# Patient Record
Sex: MALE | Race: WHITE | NOT HISPANIC OR LATINO | Employment: UNEMPLOYED | URBAN - METROPOLITAN AREA
[De-identification: names, ages, dates, MRNs, and addresses within clinical notes are randomized per-mention and may not be internally consistent; named-entity substitution may affect disease eponyms.]

---

## 2018-07-05 ENCOUNTER — OFFICE VISIT (OUTPATIENT)
Dept: RETAIL CLINIC | Facility: CLINIC | Age: 38
End: 2018-07-05

## 2018-07-05 VITALS
SYSTOLIC BLOOD PRESSURE: 118 MMHG | HEART RATE: 96 BPM | OXYGEN SATURATION: 95 % | DIASTOLIC BLOOD PRESSURE: 80 MMHG | TEMPERATURE: 97.1 F | RESPIRATION RATE: 16 BRPM

## 2018-07-05 DIAGNOSIS — L03.114 CELLULITIS OF LEFT UPPER EXTREMITY: Primary | ICD-10-CM

## 2018-07-05 PROCEDURE — 99213 OFFICE O/P EST LOW 20 MIN: CPT | Performed by: NURSE PRACTITIONER

## 2018-07-05 RX ORDER — SULFAMETHOXAZOLE AND TRIMETHOPRIM 800; 160 MG/1; MG/1
1 TABLET ORAL 2 TIMES DAILY
Qty: 20 TABLET | Refills: 0 | Status: SHIPPED | OUTPATIENT
Start: 2018-07-05 | End: 2018-07-15

## 2018-07-05 NOTE — PROGRESS NOTES
Usman Burrell is a 38 y.o. male.     Cellulitis   This is a new problem. The current episode started yesterday. The problem occurs constantly. The problem has been gradually worsening. Associated symptoms include a rash. Pertinent negatives include no anorexia, coughing, fever, headaches, myalgias, nausea or vomiting. Nothing aggravates the symptoms. He has tried nothing for the symptoms.        The following portions of the patient's history were reviewed and updated as appropriate: allergies, current medications, past family history, past medical history, past social history, past surgical history and problem list.    Review of Systems   Constitutional: Negative for fever.   Respiratory: Negative for cough.    Gastrointestinal: Negative for anorexia, nausea and vomiting.   Musculoskeletal: Negative for myalgias.   Skin: Positive for rash.   Neurological: Negative for headaches.           Physical Exam   Constitutional: He is oriented to person, place, and time. He appears well-developed and well-nourished. No distress.   HENT:   Head: Normocephalic and atraumatic.   Right Ear: External ear normal.   Left Ear: External ear normal.   Nose: Nose normal.   Mouth/Throat: Oropharynx is clear and moist.   Eyes: EOM are normal. Pupils are equal, round, and reactive to light.   Neck: Normal range of motion. Neck supple.   Cardiovascular: Normal rate, regular rhythm and normal heart sounds.    No murmur heard.  Pulmonary/Chest: Effort normal and breath sounds normal. No respiratory distress.   Neurological: He is alert and oriented to person, place, and time.   Skin: Skin is warm and dry. Rash (1mm scabbed lesion on left 1st nuckle with surrounding erythema approx. 3cm around.  tender to touch, warm to touch, minimal decrease in motion of joint secondary to pain.  no streaking, no drainage) noted.   Psychiatric: He has a normal mood and affect. His behavior is normal. Judgment and thought content normal.            Diagnoses and all orders for this visit:    Cellulitis of left upper extremity    Other orders  -     sulfamethoxazole-trimethoprim (BACTRIM DS) 800-160 MG per tablet; Take 1 tablet by mouth 2 (Two) Times a Day for 10 days.      Cellulitis, Adult  Cellulitis is a skin infection. The infected area is usually red and tender. This condition occurs most often in the arms and lower legs. The infection can travel to the muscles, blood, and underlying tissue and become serious. It is very important to get treated for this condition.  What are the causes?  Cellulitis is caused by bacteria. The bacteria enter through a break in the skin, such as a cut, burn, insect bite, open sore, or crack.  What increases the risk?  This condition is more likely to occur in people who:  · Have a weak defense system (immune system).  · Have open wounds on the skin such as cuts, burns, bites, and scrapes. Bacteria can enter the body through these open wounds.  · Are older.  · Have diabetes.  · Have a type of long-lasting (chronic) liver disease (cirrhosis) or kidney disease.  · Use IV drugs.    What are the signs or symptoms?  Symptoms of this condition include:  · Redness, streaking, or spotting on the skin.  · Swollen area of the skin.  · Tenderness or pain when an area of the skin is touched.  · Warm skin.  · Fever.  · Chills.  · Blisters.    How is this diagnosed?  This condition is diagnosed based on a medical history and physical exam. You may also have tests, including:  · Blood tests.  · Lab tests.  · Imaging tests.    How is this treated?  Treatment for this condition may include:  · Medicines, such as antibiotic medicines or antihistamines.  · Supportive care, such as rest and application of cold or warm cloths (cold or warm compresses) to the skin.  · Hospital care, if the condition is severe.    The infection usually gets better within 1-2 days of treatment.  Follow these instructions at home:  · Take over-the-counter and  prescription medicines only as told by your health care provider.  · If you were prescribed an antibiotic medicine, take it as told by your health care provider. Do not stop taking the antibiotic even if you start to feel better.  · Drink enough fluid to keep your urine clear or pale yellow.  · Do not touch or rub the infected area.  · Raise (elevate) the infected area above the level of your heart while you are sitting or lying down.  · Apply warm or cold compresses to the affected area as told by your health care provider.  · Keep all follow-up visits as told by your health care provider. This is important. These visits let your health care provider make sure a more serious infection is not developing.  Contact a health care provider if:  · You have a fever.  · Your symptoms do not improve within 1-2 days of starting treatment.  · Your bone or joint underneath the infected area becomes painful after the skin has healed.  · Your infection returns in the same area or another area.  · You notice a swollen bump in the infected area.  · You develop new symptoms.  · You have a general ill feeling (malaise) with muscle aches and pains.  Get help right away if:  · Your symptoms get worse.  · You feel very sleepy.  · You develop vomiting or diarrhea that persists.  · You notice red streaks coming from the infected area.  · Your red area gets larger or turns dark in color.  This information is not intended to replace advice given to you by your health care provider. Make sure you discuss any questions you have with your health care provider.  Document Released: 09/27/2006 Document Revised: 04/27/2017 Document Reviewed: 10/26/2016  [employer] Interactive Patient Education © 2018 [employer]

## 2018-07-05 NOTE — PATIENT INSTRUCTIONS
Cellulitis, Adult  Cellulitis is a skin infection. The infected area is usually red and tender. This condition occurs most often in the arms and lower legs. The infection can travel to the muscles, blood, and underlying tissue and become serious. It is very important to get treated for this condition.  What are the causes?  Cellulitis is caused by bacteria. The bacteria enter through a break in the skin, such as a cut, burn, insect bite, open sore, or crack.  What increases the risk?  This condition is more likely to occur in people who:  · Have a weak defense system (immune system).  · Have open wounds on the skin such as cuts, burns, bites, and scrapes. Bacteria can enter the body through these open wounds.  · Are older.  · Have diabetes.  · Have a type of long-lasting (chronic) liver disease (cirrhosis) or kidney disease.  · Use IV drugs.    What are the signs or symptoms?  Symptoms of this condition include:  · Redness, streaking, or spotting on the skin.  · Swollen area of the skin.  · Tenderness or pain when an area of the skin is touched.  · Warm skin.  · Fever.  · Chills.  · Blisters.    How is this diagnosed?  This condition is diagnosed based on a medical history and physical exam. You may also have tests, including:  · Blood tests.  · Lab tests.  · Imaging tests.    How is this treated?  Treatment for this condition may include:  · Medicines, such as antibiotic medicines or antihistamines.  · Supportive care, such as rest and application of cold or warm cloths (cold or warm compresses) to the skin.  · Hospital care, if the condition is severe.    The infection usually gets better within 1-2 days of treatment.  Follow these instructions at home:  · Take over-the-counter and prescription medicines only as told by your health care provider.  · If you were prescribed an antibiotic medicine, take it as told by your health care provider. Do not stop taking the antibiotic even if you start to feel  better.  · Drink enough fluid to keep your urine clear or pale yellow.  · Do not touch or rub the infected area.  · Raise (elevate) the infected area above the level of your heart while you are sitting or lying down.  · Apply warm or cold compresses to the affected area as told by your health care provider.  · Keep all follow-up visits as told by your health care provider. This is important. These visits let your health care provider make sure a more serious infection is not developing.  Contact a health care provider if:  · You have a fever.  · Your symptoms do not improve within 1-2 days of starting treatment.  · Your bone or joint underneath the infected area becomes painful after the skin has healed.  · Your infection returns in the same area or another area.  · You notice a swollen bump in the infected area.  · You develop new symptoms.  · You have a general ill feeling (malaise) with muscle aches and pains.  Get help right away if:  · Your symptoms get worse.  · You feel very sleepy.  · You develop vomiting or diarrhea that persists.  · You notice red streaks coming from the infected area.  · Your red area gets larger or turns dark in color.  This information is not intended to replace advice given to you by your health care provider. Make sure you discuss any questions you have with your health care provider.  Document Released: 09/27/2006 Document Revised: 04/27/2017 Document Reviewed: 10/26/2016  [employer] Interactive Patient Education © 2018 [employer]

## 2018-07-20 ENCOUNTER — OFFICE VISIT (OUTPATIENT)
Dept: FAMILY MEDICINE CLINIC | Facility: CLINIC | Age: 38
End: 2018-07-20

## 2018-07-20 VITALS
WEIGHT: 235 LBS | TEMPERATURE: 98.6 F | HEART RATE: 86 BPM | SYSTOLIC BLOOD PRESSURE: 120 MMHG | HEIGHT: 68 IN | OXYGEN SATURATION: 94 % | RESPIRATION RATE: 16 BRPM | BODY MASS INDEX: 35.61 KG/M2 | DIASTOLIC BLOOD PRESSURE: 82 MMHG

## 2018-07-20 DIAGNOSIS — L30.9 ECZEMA, UNSPECIFIED TYPE: ICD-10-CM

## 2018-07-20 DIAGNOSIS — K21.9 GASTROESOPHAGEAL REFLUX DISEASE WITHOUT ESOPHAGITIS: ICD-10-CM

## 2018-07-20 DIAGNOSIS — Z00.00 LABORATORY EXAMINATION ORDERED AS PART OF A ROUTINE GENERAL MEDICAL EXAMINATION: ICD-10-CM

## 2018-07-20 DIAGNOSIS — I83.811 VARICOSE VEINS OF LOWER EXTREMITY WITH PAIN, RIGHT: Primary | ICD-10-CM

## 2018-07-20 PROCEDURE — 99213 OFFICE O/P EST LOW 20 MIN: CPT | Performed by: PHYSICIAN ASSISTANT

## 2018-07-20 RX ORDER — FLUTICASONE PROPIONATE 0.05 %
CREAM (GRAM) TOPICAL 2 TIMES DAILY
Qty: 60 G | Refills: 5 | Status: SHIPPED | OUTPATIENT
Start: 2018-07-20 | End: 2020-03-24

## 2018-07-20 RX ORDER — RANITIDINE HCL 75 MG
75 TABLET ORAL 2 TIMES DAILY PRN
COMMUNITY
End: 2020-03-24

## 2018-07-20 NOTE — PROGRESS NOTES
Subjective   Usman Burrell is a 38 y.o. male.     History of Present Illness   Usman Burrell 38 y.o. male who presents today for a new patient appointment.    he has a history of   Patient Active Problem List   Diagnosis   • Varicose veins of lower extremity with pain, right   • Gastroesophageal reflux disease without esophagitis   .  he is here to establish care I reviewed the PFSH recorded today by my MA/LPN staff.   he   He has been feeling tired.  He has been working more hours??  No thyroid d/o in family  Not snoring  GERD controlled with Zantac    Large varicose vein right lower leg and hurts--mom has them  Lost 50 lbs and bp better  Was seeing DR Wilkins  Had eye exam June and fine;  Mom has glaucoma    He has been more tired and will get prevention labs    Rash for years right medial calf and steroid crm in past helps and comes back    The following portions of the patient's history were reviewed and updated as appropriate: allergies, current medications, past family history, past medical history, past social history, past surgical history and problem list.    Review of Systems   Constitutional: Positive for fatigue. Negative for activity change, appetite change and unexpected weight change.   HENT: Negative for nosebleeds and trouble swallowing.    Eyes: Negative for pain and visual disturbance.   Respiratory: Negative for chest tightness, shortness of breath and wheezing.    Cardiovascular: Negative for chest pain and palpitations.   Gastrointestinal: Negative for abdominal pain and blood in stool.   Endocrine: Negative.    Genitourinary: Negative for difficulty urinating and hematuria.   Musculoskeletal: Negative for joint swelling.   Skin: Positive for rash. Negative for color change.   Allergic/Immunologic: Negative.    Neurological: Negative for syncope and speech difficulty.   Hematological: Negative for adenopathy.   Psychiatric/Behavioral: Negative for agitation and confusion.   All other systems  reviewed and are negative.      Objective   Physical Exam   Constitutional: He is oriented to person, place, and time. He appears well-developed and well-nourished. No distress.   HENT:   Head: Normocephalic and atraumatic.   Right Ear: External ear normal.   Left Ear: External ear normal.   Nose: Nose normal.   Mouth/Throat: Oropharynx is clear and moist. No oropharyngeal exudate.   Eyes: Pupils are equal, round, and reactive to light. Conjunctivae and EOM are normal. Right eye exhibits no discharge. Left eye exhibits no discharge. No scleral icterus.   Neck: Normal range of motion. Neck supple. No tracheal deviation present. No thyromegaly present.   Cardiovascular: Normal rate, regular rhythm, normal heart sounds, intact distal pulses and normal pulses.  Exam reveals no gallop.    No murmur heard.  Pulmonary/Chest: Effort normal and breath sounds normal. No respiratory distress. He has no wheezes. He has no rales.   Abdominal: Soft. There is no tenderness.   Musculoskeletal: Normal range of motion. He exhibits no tenderness.   Varicose veins right leg--large   Lymphadenopathy:     He has no cervical adenopathy.   Neurological: He is alert and oriented to person, place, and time. He exhibits normal muscle tone. Coordination normal.   Skin: Skin is warm. Rash (right leg medial calf cluster pink papules) noted. No erythema. No pallor.   ? Finger right pink papules; eczema also   Psychiatric: He has a normal mood and affect. His behavior is normal. Judgment and thought content normal.   Nursing note and vitals reviewed.      Assessment/Plan   Usman was seen today for establish care and fatigue.    Diagnoses and all orders for this visit:    Varicose veins of lower extremity with pain, right  -     Ambulatory Referral to Vascular Surgery    Gastroesophageal reflux disease without esophagitis    Eczema, unspecified type    Laboratory examination ordered as part of a routine general medical examination    Other  orders  -     fluticasone (CUTIVATE) 0.05 % cream; Apply  topically 2 (Two) Times a Day. To eczema PRN

## 2018-07-25 LAB
25(OH)D3+25(OH)D2 SERPL-MCNC: 27.2 NG/ML (ref 30–100)
ALBUMIN SERPL-MCNC: 4.9 G/DL (ref 3.5–5.2)
ALBUMIN/GLOB SERPL: 2 G/DL
ALP SERPL-CCNC: 53 U/L (ref 39–117)
ALT SERPL-CCNC: 30 U/L (ref 1–41)
APPEARANCE UR: CLEAR
AST SERPL-CCNC: 16 U/L (ref 1–40)
BACTERIA #/AREA URNS HPF: NORMAL /HPF
BASOPHILS # BLD AUTO: 0.05 10*3/MM3 (ref 0–0.2)
BASOPHILS NFR BLD AUTO: 1 % (ref 0–1.5)
BILIRUB SERPL-MCNC: 0.4 MG/DL (ref 0.1–1.2)
BILIRUB UR QL STRIP: NEGATIVE
BUN SERPL-MCNC: 21 MG/DL (ref 6–20)
BUN/CREAT SERPL: 18.3 (ref 7–25)
CALCIUM SERPL-MCNC: 9.5 MG/DL (ref 8.6–10.5)
CASTS URNS MICRO: NORMAL
CHLORIDE SERPL-SCNC: 102 MMOL/L (ref 98–107)
CHOLEST SERPL-MCNC: 151 MG/DL (ref 0–200)
CO2 SERPL-SCNC: 25.8 MMOL/L (ref 22–29)
COLOR UR: YELLOW
CREAT SERPL-MCNC: 1.15 MG/DL (ref 0.76–1.27)
EOSINOPHIL # BLD AUTO: 0.1 10*3/MM3 (ref 0–0.7)
EOSINOPHIL NFR BLD AUTO: 2 % (ref 0.3–6.2)
EPI CELLS #/AREA URNS HPF: NORMAL /HPF
ERYTHROCYTE [DISTWIDTH] IN BLOOD BY AUTOMATED COUNT: 13.3 % (ref 11.5–14.5)
FOLATE SERPL-MCNC: 7.08 NG/ML (ref 4.78–24.2)
GLOBULIN SER CALC-MCNC: 2.4 GM/DL
GLUCOSE SERPL-MCNC: 103 MG/DL (ref 65–99)
GLUCOSE UR QL: NEGATIVE
HCT VFR BLD AUTO: 48.4 % (ref 40.4–52.2)
HDLC SERPL-MCNC: 60 MG/DL (ref 40–60)
HGB BLD-MCNC: 16.1 G/DL (ref 13.7–17.6)
HGB UR QL STRIP: NEGATIVE
IMM GRANULOCYTES # BLD: 0.02 10*3/MM3 (ref 0–0.03)
IMM GRANULOCYTES NFR BLD: 0.4 % (ref 0–0.5)
KETONES UR QL STRIP: NEGATIVE
LDLC SERPL CALC-MCNC: 81 MG/DL (ref 0–100)
LEUKOCYTE ESTERASE UR QL STRIP: NEGATIVE
LYMPHOCYTES # BLD AUTO: 1.5 10*3/MM3 (ref 0.9–4.8)
LYMPHOCYTES NFR BLD AUTO: 30.7 % (ref 19.6–45.3)
MCH RBC QN AUTO: 32.1 PG (ref 27–32.7)
MCHC RBC AUTO-ENTMCNC: 33.3 G/DL (ref 32.6–36.4)
MCV RBC AUTO: 96.4 FL (ref 79.8–96.2)
MONOCYTES # BLD AUTO: 0.62 10*3/MM3 (ref 0.2–1.2)
MONOCYTES NFR BLD AUTO: 12.7 % (ref 5–12)
NEUTROPHILS # BLD AUTO: 2.61 10*3/MM3 (ref 1.9–8.1)
NEUTROPHILS NFR BLD AUTO: 53.6 % (ref 42.7–76)
NITRITE UR QL STRIP: NEGATIVE
PH UR STRIP: 5.5 [PH] (ref 5–8)
PLATELET # BLD AUTO: 212 10*3/MM3 (ref 140–500)
POTASSIUM SERPL-SCNC: 4.2 MMOL/L (ref 3.5–5.2)
PROT SERPL-MCNC: 7.3 G/DL (ref 6–8.5)
PROT UR QL STRIP: NEGATIVE
RBC # BLD AUTO: 5.02 10*6/MM3 (ref 4.6–6)
RBC #/AREA URNS HPF: NORMAL /HPF
SODIUM SERPL-SCNC: 141 MMOL/L (ref 136–145)
SP GR UR: 1.03 (ref 1–1.03)
T3FREE SERPL-MCNC: 3.6 PG/ML (ref 2–4.4)
T4 FREE SERPL-MCNC: 1.56 NG/DL (ref 0.93–1.7)
TRIGL SERPL-MCNC: 52 MG/DL (ref 0–150)
TSH SERPL DL<=0.005 MIU/L-ACNC: 2.43 MIU/ML (ref 0.27–4.2)
UROBILINOGEN UR STRIP-MCNC: (no result) MG/DL
VIT B12 SERPL-MCNC: 498 PG/ML (ref 211–946)
VLDLC SERPL CALC-MCNC: 10.4 MG/DL (ref 5–40)
WBC # BLD AUTO: 4.88 10*3/MM3 (ref 4.5–10.7)
WBC #/AREA URNS HPF: NORMAL /HPF

## 2019-10-07 NOTE — PATIENT INSTRUCTIONS
Low glycemic index diet  Exercise 30 minutes most days of the week  Make sure you get results on any labs or tests we ordered today  We discussed medications and how to take them as prescribed  Sleep 6-8 hours each night if possible  If you have not signed up for [employer], please activate your code ASAP from your After Visit Summary today    LDL goal <100  LDL goal if heart disease <70  HDL goal >60  Triglyceride goal <150  BP goal =<130/80  Fasting glucose <100       Patient with one or more new problems requiring additional work-up/treatment.

## 2020-03-24 ENCOUNTER — OFFICE VISIT (OUTPATIENT)
Dept: FAMILY MEDICINE CLINIC | Facility: CLINIC | Age: 40
End: 2020-03-24

## 2020-03-24 VITALS
WEIGHT: 246 LBS | BODY MASS INDEX: 37.28 KG/M2 | TEMPERATURE: 98.3 F | HEIGHT: 68 IN | DIASTOLIC BLOOD PRESSURE: 88 MMHG | OXYGEN SATURATION: 96 % | HEART RATE: 76 BPM | SYSTOLIC BLOOD PRESSURE: 130 MMHG | RESPIRATION RATE: 16 BRPM

## 2020-03-24 DIAGNOSIS — E55.9 VITAMIN D DEFICIENCY: ICD-10-CM

## 2020-03-24 DIAGNOSIS — F41.8 SITUATIONAL ANXIETY: ICD-10-CM

## 2020-03-24 DIAGNOSIS — K21.9 GASTROESOPHAGEAL REFLUX DISEASE WITHOUT ESOPHAGITIS: Primary | ICD-10-CM

## 2020-03-24 DIAGNOSIS — Z00.00 LABORATORY EXAMINATION ORDERED AS PART OF A ROUTINE GENERAL MEDICAL EXAMINATION: ICD-10-CM

## 2020-03-24 PROCEDURE — 99214 OFFICE O/P EST MOD 30 MIN: CPT | Performed by: PHYSICIAN ASSISTANT

## 2020-03-24 RX ORDER — METHYLPREDNISOLONE 4 MG/1
TABLET ORAL
COMMUNITY
Start: 2020-03-21 | End: 2020-03-24

## 2020-03-24 RX ORDER — BUSPIRONE HYDROCHLORIDE 10 MG/1
10 TABLET ORAL 2 TIMES DAILY
COMMUNITY
Start: 2020-03-21 | End: 2020-03-24 | Stop reason: SDUPTHER

## 2020-03-24 RX ORDER — BUSPIRONE HYDROCHLORIDE 10 MG/1
10 TABLET ORAL 3 TIMES DAILY
Qty: 90 TABLET | Refills: 5 | Status: SHIPPED | OUTPATIENT
Start: 2020-03-24 | End: 2020-11-30 | Stop reason: SDDI

## 2020-03-24 RX ORDER — PANTOPRAZOLE SODIUM 40 MG/1
40 TABLET, DELAYED RELEASE ORAL DAILY
COMMUNITY
Start: 2020-03-21 | End: 2020-03-24

## 2020-03-24 RX ORDER — ESOMEPRAZOLE MAGNESIUM 40 MG/1
40 CAPSULE, DELAYED RELEASE ORAL DAILY
Qty: 30 CAPSULE | Refills: 5 | Status: SHIPPED | OUTPATIENT
Start: 2020-03-24 | End: 2020-09-10

## 2020-03-24 NOTE — PATIENT INSTRUCTIONS
Heartburn  Heartburn is a type of pain or discomfort that can happen in the throat or chest. It is often described as a burning pain. It may also cause a bad, acid-like taste in the mouth. Heartburn may feel worse when you lie down or bend over, and it is often worse at night. Heartburn may be caused by stomach contents that move back up into the esophagus (reflux).  Follow these instructions at home:  Eating and drinking    · Avoid certain foods and drinks as told by your health care provider. This may include:  ? Coffee and tea (with or without caffeine).  ? Drinks that contain alcohol.  ? Energy drinks and sports drinks.  ? Carbonated drinks or sodas.  ? Chocolate and cocoa.  ? Peppermint and mint flavorings.  ? Garlic and onions.  ? Horseradish.  ? Spicy and acidic foods, including peppers, chili powder, jones powder, vinegar, hot sauces, and barbecue sauce.  ? Citrus fruit juices and citrus fruits, such as oranges, rere, and limes.  ? Tomato-based foods, such as red sauce, chili, salsa, and pizza with red sauce.  ? Fried and fatty foods, such as donuts, french fries, potato chips, and high-fat dressings.  ? High-fat meats, such as hot dogs and fatty cuts of red and white meats, such as rib eye steak, sausage, ham, and baptiste.  ? High-fat dairy items, such as whole milk, butter, and cream cheese.  · Eat small, frequent meals instead of large meals.  · Avoid drinking large amounts of liquid with your meals.  · Avoid eating meals during the 2-3 hours before bedtime.  · Avoid lying down right after you eat.  · Do not exercise right after you eat.  Lifestyle         · If you are overweight, reduce your weight to an amount that is healthy for you. Ask your health care provider for guidance about a safe weight loss goal.  · Do not use any products that contain nicotine or tobacco, such as cigarettes, e-cigarettes, and chewing tobacco. These can make your symptoms worse. If you need help quitting, ask your health care  provider.  · Wear loose-fitting clothing. Do not wear anything tight around your waist that causes pressure on your abdomen.  · Raise (elevate) the head of your bed about 6 inches (15 cm) when you sleep.  · Try to reduce your stress, such as with yoga or meditation. If you need help reducing stress, ask your health care provider.  General instructions  · Pay attention to any changes in your symptoms.  · Take over-the-counter and prescription medicines only as told by your health care provider.  ? Do not take aspirin, ibuprofen, or other NSAIDs unless your health care provider told you to do so.  ? Stop medicines only as told by your health care provider. If you stop taking some medicines too quickly, your symptoms may get worse.  · Keep all follow-up visits as told by your health care provider. This is important.  Contact a health care provider if:  · You have new symptoms.  · You have unexplained weight loss.  · You have difficulty swallowing, or it hurts to swallow.  · You have wheezing or a persistent cough.  · Your symptoms do not improve with treatment.  · You have frequent heartburn for more than 2 weeks.  Get help right away if:  · You have pain in your arms, neck, jaw, teeth, or back.  · You feel sweaty, dizzy, or light-headed.  · You have chest pain or shortness of breath.  · You vomit and your vomit looks like blood or coffee grounds.  · Your stool is bloody or black.  These symptoms may represent a serious problem that is an emergency. Do not wait to see if the symptoms will go away. Get medical help right away. Call your local emergency services (911 in the [place] Do not drive yourself to the hospital.  Summary  · Heartburn is a type of pain or discomfort that can happen in the throat or chest. It is often described as a burning pain. It may also cause a bad, acid-like taste in the mouth.  · Avoid certain foods and drinks as told by your health care provider.  · Take over-the-counter and prescription  medicines only as told by your health care provider. Do not take aspirin, ibuprofen, or other NSAIDs unless your health care provider told you to do so.  · Contact a health care provider if your symptoms do not improve or they get worse.  This information is not intended to replace advice given to you by your health care provider. Make sure you discuss any questions you have with your health care provider.  Document Released: 05/06/2010 Document Revised: 05/20/2019 Document Reviewed: 05/20/2019  [employer] Interactive Patient Education © 2020 [employer]      Food Choices for Gastroesophageal Reflux Disease, Adult  When you have gastroesophageal reflux disease (GERD), the foods you eat and your eating habits are very important. Choosing the right foods can help ease the discomfort of GERD. Consider working with a diet and nutrition specialist (dietitian) to help you make healthy food choices.  What general guidelines should I follow?    Eating plan  · Choose healthy foods low in fat, such as fruits, vegetables, whole grains, low-fat dairy products, and lean meat, fish, and poultry.  · Eat frequent, small meals instead of three large meals each day. Eat your meals slowly, in a relaxed setting. Avoid bending over or lying down until 2-3 hours after eating.  · Limit high-fat foods such as fatty meats or fried foods.  · Limit your intake of oils, butter, and shortening to less than 8 teaspoons each day.  · Avoid the following:  ? Foods that cause symptoms. These may be different for different people. Keep a food diary to keep track of foods that cause symptoms.  ? Alcohol.  ? Drinking large amounts of liquid with meals.  ? Eating meals during the 2-3 hours before bed.  · Cook foods using methods other than frying. This may include baking, grilling, or broiling.  Lifestyle  · Maintain a healthy weight. Ask your health care provider what weight is healthy for you. If you need to lose weight, work with your health care  provider to do so safely.  · Exercise for at least 30 minutes on 5 or more days each week, or as told by your health care provider.  · Avoid wearing clothes that fit tightly around your waist and chest.  · Do not use any products that contain nicotine or tobacco, such as cigarettes and e-cigarettes. If you need help quitting, ask your health care provider.  · Sleep with the head of your bed raised. Use a wedge under the mattress or blocks under the bed frame to raise the head of the bed.  What foods are not recommended?  The items listed may not be a complete list. Talk with your dietitian about what dietary choices are best for you.  Grains  Pastries or quick breads with added fat. French toast.  Vegetables  Deep fried vegetables. French fries. Any vegetables prepared with added fat. Any vegetables that cause symptoms. For some people this may include tomatoes and tomato products, chili peppers, onions and garlic, and horseradish.  Fruits  Any fruits prepared with added fat. Any fruits that cause symptoms. For some people this may include citrus fruits, such as oranges, grapefruit, pineapple, and rere.  Meats and other protein foods  High-fat meats, such as fatty beef or pork, hot dogs, ribs, ham, sausage, salami and baptiste. Fried meat or protein, including fried fish and fried chicken. Nuts and nut butters.  Dairy  Whole milk and chocolate milk. Sour cream. Cream. Ice cream. Cream cheese. Milk shakes.  Beverages  Coffee and tea, with or without caffeine. Carbonated beverages. Sodas. Energy drinks. Fruit juice made with acidic fruits (such as orange or grapefruit). Tomato juice. Alcoholic drinks.  Fats and oils  Butter. Margarine. Shortening. Ghee.  Sweets and desserts  Chocolate and cocoa. Donuts.  Seasoning and other foods  Pepper. Peppermint and spearmint. Any condiments, herbs, or seasonings that cause symptoms. For some people, this may include jones, hot sauce, or vinegar-based salad  dressings.  Summary  · When you have gastroesophageal reflux disease (GERD), food and lifestyle choices are very important to help ease the discomfort of GERD.  · Eat frequent, small meals instead of three large meals each day. Eat your meals slowly, in a relaxed setting. Avoid bending over or lying down until 2-3 hours after eating.  · Limit high-fat foods such as fatty meat or fried foods.  This information is not intended to replace advice given to you by your health care provider. Make sure you discuss any questions you have with your health care provider.  Document Released: 12/18/2006 Document Revised: 12/19/2017 Document Reviewed: 12/19/2017  [employer] Interactive Patient Education © 2020 [employer]

## 2020-03-24 NOTE — PROGRESS NOTES
Subjective   Usman Burrell is a 39 y.o. male.     History of Present Illness     Sat; went to [facility] and Rx Medrol  Went [facility]; Rx given for GERD---Rx PPI  Anxiety and Rx buspar  Usman Burrell 39 y.o. male who presents for evaluation of nausea and GERD. Symptoms have been present for 1 month .  The condition is aggravated by eating any food and laying down after eating . he is experiencing dsyphagia and GERD.  Alleviating factors are antiacids with some help, but still symptoms . Patient denies fever, diarrhea, constipation, melena, vomiting and recent travel his past medical history is notable for GERD.  Patient denies recent travel.            The following portions of the patient's history were reviewed and updated as appropriate: allergies, current medications, past family history, past medical history, past social history, past surgical history and problem list.    Review of Systems   Constitutional: Negative for activity change, appetite change and unexpected weight change.   HENT: Positive for sore throat. Negative for nosebleeds and trouble swallowing.    Eyes: Negative for pain and visual disturbance.   Respiratory: Negative for chest tightness, shortness of breath and wheezing.    Cardiovascular: Negative for chest pain and palpitations.   Gastrointestinal: Positive for nausea. Negative for abdominal pain and blood in stool.   Endocrine: Negative.    Genitourinary: Negative for difficulty urinating and hematuria.   Musculoskeletal: Negative for joint swelling.   Skin: Negative for color change and rash.   Allergic/Immunologic: Negative.    Neurological: Negative for syncope and speech difficulty.   Hematological: Negative for adenopathy.   Psychiatric/Behavioral: Negative for agitation and confusion. The patient is nervous/anxious.    All other systems reviewed and are negative.      Objective   Physical Exam   Constitutional: He is oriented to person, place, and time. He appears well-developed and  well-nourished. No distress.   HENT:   Head: Normocephalic and atraumatic.   Right Ear: External ear normal.   Left Ear: External ear normal.   Nose: Nose normal.   Mouth/Throat: Oropharynx is clear and moist. No oropharyngeal exudate.   Narrow airway   Eyes: Pupils are equal, round, and reactive to light. Conjunctivae and EOM are normal. Right eye exhibits no discharge. Left eye exhibits no discharge. No scleral icterus.   Neck: Normal range of motion. Neck supple. No tracheal deviation present. No thyromegaly present.   Cardiovascular: Normal rate, regular rhythm, normal heart sounds, intact distal pulses and normal pulses. Exam reveals no gallop.   No murmur heard.  Pulmonary/Chest: Effort normal and breath sounds normal. No respiratory distress. He has no wheezes. He has no rales.   Abdominal: Soft. There is no tenderness.   Musculoskeletal: Normal range of motion.   Lymphadenopathy:     He has no cervical adenopathy.   Neurological: He is alert and oriented to person, place, and time. He exhibits normal muscle tone. Coordination normal.   Skin: Skin is warm. No rash noted. No erythema. No pallor.   Psychiatric: He has a normal mood and affect. His behavior is normal. Judgment and thought content normal.   Nursing note and vitals reviewed.      Assessment/Plan   Usman was seen today for heartburn.    Diagnoses and all orders for this visit:    Gastroesophageal reflux disease without esophagitis    Situational anxiety    Vitamin D deficiency  -     Comprehensive metabolic panel  -     Lipid panel  -     CBC and Differential  -     TSH  -     T3, Free  -     T4, Free  -     Vitamin B12  -     Folate  -     Vitamin D 25 Hydroxy    Laboratory examination ordered as part of a routine general medical examination  -     Comprehensive metabolic panel  -     Lipid panel  -     CBC and Differential  -     TSH  -     T3, Free  -     T4, Free  -     Vitamin B12  -     Folate  -     Vitamin D 25 Hydroxy    Other orders  -      esomeprazole (nexIUM) 40 MG capsule; Take 1 capsule by mouth Daily. For GERD; this replaced Protonix        Change PPI  Cont Buspar  Labs  May need EGD;   See if snores----let me know       Answers for HPI/ROS submitted by the patient on 3/24/2020   What is the primary reason for your visit?: Other  Please describe your symptoms.: Acid reflux feels like lump in throat when I swallow at night when lay down gets worse and mouth gets dry   then get anxiety over it  Have you had these symptoms before?: Yes  How long have you been having these symptoms?: Other  Please list any medications you are currently taking for this condition.: Methylprednisolone  pack, Buspirone 10mg , Pantoprazole 40 mg  Please describe any probable cause for these symptoms. : Acid reflux anxiety  fluid on ears

## 2020-05-19 DIAGNOSIS — R07.0 THROAT PAIN: ICD-10-CM

## 2020-05-19 DIAGNOSIS — K21.9 GASTROESOPHAGEAL REFLUX DISEASE WITHOUT ESOPHAGITIS: Primary | ICD-10-CM

## 2020-05-23 DIAGNOSIS — E55.9 VITAMIN D DEFICIENCY: Primary | ICD-10-CM

## 2020-05-23 DIAGNOSIS — E53.8 LOW FOLIC ACID: ICD-10-CM

## 2020-05-23 DIAGNOSIS — E53.8 LOW SERUM VITAMIN B12: ICD-10-CM

## 2020-05-23 LAB
25(OH)D3+25(OH)D2 SERPL-MCNC: 23.8 NG/ML (ref 30–100)
ALBUMIN SERPL-MCNC: 4.6 G/DL (ref 4–5)
ALBUMIN/GLOB SERPL: 2.1 {RATIO} (ref 1.2–2.2)
ALP SERPL-CCNC: 59 IU/L (ref 39–117)
ALT SERPL-CCNC: 18 IU/L (ref 0–44)
AST SERPL-CCNC: 13 IU/L (ref 0–40)
BASOPHILS # BLD AUTO: 0.1 X10E3/UL (ref 0–0.2)
BASOPHILS NFR BLD AUTO: 2 %
BILIRUB SERPL-MCNC: 0.5 MG/DL (ref 0–1.2)
BUN SERPL-MCNC: 15 MG/DL (ref 6–24)
BUN/CREAT SERPL: 14 (ref 9–20)
CALCIUM SERPL-MCNC: 9.2 MG/DL (ref 8.7–10.2)
CHLORIDE SERPL-SCNC: 101 MMOL/L (ref 96–106)
CHOLEST SERPL-MCNC: 147 MG/DL (ref 100–199)
CO2 SERPL-SCNC: 23 MMOL/L (ref 20–29)
CREAT SERPL-MCNC: 1.1 MG/DL (ref 0.76–1.27)
EOSINOPHIL # BLD AUTO: 0.1 X10E3/UL (ref 0–0.4)
EOSINOPHIL NFR BLD AUTO: 3 %
ERYTHROCYTE [DISTWIDTH] IN BLOOD BY AUTOMATED COUNT: 13.2 % (ref 11.6–15.4)
FOLATE SERPL-MCNC: 4 NG/ML
GLOBULIN SER CALC-MCNC: 2.2 G/DL (ref 1.5–4.5)
GLUCOSE SERPL-MCNC: 94 MG/DL (ref 65–99)
HCT VFR BLD AUTO: 43.6 % (ref 37.5–51)
HDLC SERPL-MCNC: 54 MG/DL
HGB BLD-MCNC: 15.2 G/DL (ref 13–17.7)
IMM GRANULOCYTES # BLD AUTO: 0 X10E3/UL (ref 0–0.1)
IMM GRANULOCYTES NFR BLD AUTO: 0 %
LDLC SERPL CALC-MCNC: 86 MG/DL (ref 0–99)
LYMPHOCYTES # BLD AUTO: 1 X10E3/UL (ref 0.7–3.1)
LYMPHOCYTES NFR BLD AUTO: 24 %
MCH RBC QN AUTO: 32.5 PG (ref 26.6–33)
MCHC RBC AUTO-ENTMCNC: 34.9 G/DL (ref 31.5–35.7)
MCV RBC AUTO: 93 FL (ref 79–97)
MONOCYTES # BLD AUTO: 0.7 X10E3/UL (ref 0.1–0.9)
MONOCYTES NFR BLD AUTO: 17 %
NEUTROPHILS # BLD AUTO: 2.2 X10E3/UL (ref 1.4–7)
NEUTROPHILS NFR BLD AUTO: 54 %
PLATELET # BLD AUTO: 190 X10E3/UL (ref 150–450)
POTASSIUM SERPL-SCNC: 4.1 MMOL/L (ref 3.5–5.2)
PROT SERPL-MCNC: 6.8 G/DL (ref 6–8.5)
RBC # BLD AUTO: 4.68 X10E6/UL (ref 4.14–5.8)
SODIUM SERPL-SCNC: 140 MMOL/L (ref 134–144)
T3FREE SERPL-MCNC: 3.5 PG/ML (ref 2–4.4)
T4 FREE SERPL-MCNC: 1.39 NG/DL (ref 0.82–1.77)
TRIGL SERPL-MCNC: 36 MG/DL (ref 0–149)
TSH SERPL DL<=0.005 MIU/L-ACNC: 1.79 UIU/ML (ref 0.45–4.5)
VIT B12 SERPL-MCNC: 474 PG/ML (ref 232–1245)
VLDLC SERPL CALC-MCNC: 7 MG/DL (ref 5–40)
WBC # BLD AUTO: 4.1 X10E3/UL (ref 3.4–10.8)

## 2020-05-23 RX ORDER — MAGNESIUM 200 MG
TABLET ORAL
Qty: 90 EACH | Refills: 3 | Status: SHIPPED | OUTPATIENT
Start: 2020-05-23 | End: 2022-03-09

## 2020-05-23 RX ORDER — FOLIC ACID 1 MG/1
1 TABLET ORAL DAILY
Qty: 90 TABLET | Refills: 1 | Status: SHIPPED | OUTPATIENT
Start: 2020-05-23 | End: 2020-11-30 | Stop reason: SDUPTHER

## 2020-06-01 ENCOUNTER — OFFICE VISIT (OUTPATIENT)
Dept: GASTROENTEROLOGY | Facility: CLINIC | Age: 40
End: 2020-06-01

## 2020-06-01 VITALS — HEIGHT: 68 IN | TEMPERATURE: 97.8 F | BODY MASS INDEX: 31.83 KG/M2 | WEIGHT: 210 LBS

## 2020-06-01 DIAGNOSIS — R12 HEARTBURN: ICD-10-CM

## 2020-06-01 DIAGNOSIS — R09.89 GLOBUS SENSATION: Primary | ICD-10-CM

## 2020-06-01 PROBLEM — R09.A2 GLOBUS SENSATION: Status: ACTIVE | Noted: 2020-06-01

## 2020-06-01 PROCEDURE — 99204 OFFICE O/P NEW MOD 45 MIN: CPT | Performed by: INTERNAL MEDICINE

## 2020-06-01 RX ORDER — SUCRALFATE 1 G/1
1 TABLET ORAL
Qty: 90 TABLET | Refills: 1 | Status: SHIPPED | OUTPATIENT
Start: 2020-06-01 | End: 2020-11-30 | Stop reason: SDDI

## 2020-06-01 NOTE — PROGRESS NOTES
Chief Complaint   Patient presents with   • Difficulty Swallowing   • Globus sensation   • Nausea       Subjective     HPI    Usman Burrell is a 40 y.o. male with a past medical history noted below who presents for evaluation of globus sensation and heartburn.  Symptoms present for about 2-3 months.  He describes globus sensation following various drinks-- coffee, diet coke, lemonade.  Feels a lump in his throat, has to clear his throat a lot.  Feels that he suddenly develops a lot of mucus.  He has not had anything get stuck per se.  Is on a keto diet and eats lots of chicken and meat and does not feel those foods get stuck.  Can last up to a few hours at a time.  He does endorse seasonal allergies but is not needing a continuous year-round allergy medication.    He reports he recently had evaluation with an ear nose and throat doctor including scoping that was reportedly normal.    Does have issues with heartburn.  Burning epigastric pain and occasional acidic reflux.  He has been on 40 mg of S omeprazole for at least 2 to 3 months.  He finds that the burning pain is improved but he still has issues with some acidic reflux especially with laying recumbent at night.    He has had some intentional weight loss with the keto diet.    No family history of GI malignancy    No smoking, no excess alcohol    Works at a [employer]        Today's visit was in the office.  Both the patient and I were wearing face masks and proper hand hygiene was performed before and after the physical exam.       Past Medical History:   Diagnosis Date   • Acid reflux    • Allergic    • Anxiety Last week   • Asthma          Current Outpatient Medications:   •  busPIRone (BUSPAR) 10 MG tablet, Take 1 tablet by mouth 3 (Three) Times a Day. PRN anxiety (Patient taking differently: Take 10 mg by mouth As Needed. PRN anxiety), Disp: 90 tablet, Rfl: 5  •  Cholecalciferol 50 MCG (2000 UT) capsule, One PO daily, Disp: 90 each, Rfl: 3  •   esomeprazole (nexIUM) 40 MG capsule, Take 1 capsule by mouth Daily. For GERD; this replaced Protonix, Disp: 30 capsule, Rfl: 5  •  folic acid (FOLVITE) 1 MG tablet, Take 1 tablet by mouth Daily., Disp: 90 tablet, Rfl: 1  •  Cyanocobalamin (VITAMIN B-12) 1000 MCG sublingual tablet, One SL daily, Disp: 90 each, Rfl: 3  •  sucralfate (CARAFATE) 1 g tablet, Take 1 tablet by mouth 3 (Three) Times a Day Before Meals. Dissolve 1 tablet in 4 ounces of water and swallow., Disp: 90 tablet, Rfl: 1    Allergies   Allergen Reactions   • Penicillins Anaphylaxis       Social History     Socioeconomic History   • Marital status:      Spouse name: Not on file   • Number of children: Not on file   • Years of education: Not on file   • Highest education level: Not on file   Tobacco Use   • Smoking status: Never Smoker   • Smokeless tobacco: Never Used   Substance and Sexual Activity   • Alcohol use: No   • Drug use: No   • Sexual activity: Yes     Partners: Female       Family History   Problem Relation Age of Onset   • Diabetes Mother    • Lung disease Mother    • Hypertension Mother    • Depression Mother    • Glaucoma Mother    • Heart disease Father    • Hypertension Father        Review of Systems   Constitutional: Negative for activity change, appetite change and fatigue.   HENT: Negative for sore throat and trouble swallowing.    Respiratory: Negative.    Cardiovascular: Negative.    Gastrointestinal: Negative for abdominal distention, abdominal pain and blood in stool.        Heartburn, globus   Endocrine: Negative for cold intolerance and heat intolerance.   Genitourinary: Negative for difficulty urinating, dysuria and frequency.   Musculoskeletal: Negative for arthralgias, back pain and myalgias.   Skin: Negative.    Hematological: Negative for adenopathy. Does not bruise/bleed easily.   All other systems reviewed and are negative.      Objective     Vitals:    06/01/20 0855   Temp: 97.8 °F (36.6 °C)          06/01/20  0855   Weight: 95.3 kg (210 lb)     Body mass index is 31.93 kg/m².    Physical Exam   Constitutional: He is oriented to person, place, and time. He appears well-developed and well-nourished. No distress.   HENT:   Head: Normocephalic and atraumatic.   Right Ear: External ear normal.   Left Ear: External ear normal.   Nose: Nose normal.   Mouth/Throat: Oropharynx is clear and moist.   Eyes: Conjunctivae and EOM are normal. Right eye exhibits no discharge. Left eye exhibits no discharge. No scleral icterus.   Neck: Normal range of motion. Neck supple. No thyromegaly present.   No supraclavicular adenopathy   Cardiovascular: Normal rate, regular rhythm, normal heart sounds and intact distal pulses. Exam reveals no gallop.   No murmur heard.  No lower extremity edema   Pulmonary/Chest: Effort normal and breath sounds normal. No respiratory distress. He has no wheezes.   Abdominal: Soft. Normal appearance and bowel sounds are normal. He exhibits no distension and no mass. There is no hepatosplenomegaly. There is no tenderness. There is no rigidity, no rebound and no guarding. No hernia.   Genitourinary:   Genitourinary Comments: Rectal exam deferred   Musculoskeletal: Normal range of motion. He exhibits no edema or tenderness.   No atrophy of upper or lower extremities.  Normal digits and nails of both hands.   Lymphadenopathy:     He has no cervical adenopathy.   Neurological: He is alert and oriented to person, place, and time. He displays no atrophy. Coordination normal.   Skin: Skin is warm and dry. No rash noted. He is not diaphoretic. No erythema.   Psychiatric: He has a normal mood and affect. His behavior is normal. Judgment and thought content normal.   Vitals reviewed.      WBC   Date Value Ref Range Status   05/22/2020 4.1 3.4 - 10.8 x10E3/uL Final     RBC   Date Value Ref Range Status   05/22/2020 4.68 4.14 - 5.80 x10E6/uL Final     Hemoglobin   Date Value Ref Range Status   05/22/2020 15.2 13.0 -  17.7 g/dL Final     Hematocrit   Date Value Ref Range Status   05/22/2020 43.6 37.5 - 51.0 % Final     MCV   Date Value Ref Range Status   05/22/2020 93 79 - 97 fL Final     MCH   Date Value Ref Range Status   05/22/2020 32.5 26.6 - 33.0 pg Final     MCHC   Date Value Ref Range Status   05/22/2020 34.9 31.5 - 35.7 g/dL Final     RDW   Date Value Ref Range Status   05/22/2020 13.2 11.6 - 15.4 % Final     Platelets   Date Value Ref Range Status   05/22/2020 190 150 - 450 x10E3/uL Final     Neutrophil Rel %   Date Value Ref Range Status   05/22/2020 54 Not Estab. % Final     Lymphocyte Rel %   Date Value Ref Range Status   05/22/2020 24 Not Estab. % Final     Monocyte Rel %   Date Value Ref Range Status   05/22/2020 17 Not Estab. % Final     Eosinophil Rel %   Date Value Ref Range Status   05/22/2020 3 Not Estab. % Final     Basophil Rel %   Date Value Ref Range Status   05/22/2020 2 Not Estab. % Final     Neutrophils Absolute   Date Value Ref Range Status   05/22/2020 2.2 1.4 - 7.0 x10E3/uL Final     Lymphocytes Absolute   Date Value Ref Range Status   05/22/2020 1.0 0.7 - 3.1 x10E3/uL Final     Monocytes Absolute   Date Value Ref Range Status   05/22/2020 0.7 0.1 - 0.9 x10E3/uL Final     Eosinophils Absolute   Date Value Ref Range Status   05/22/2020 0.1 0.0 - 0.4 x10E3/uL Final     Basophils Absolute   Date Value Ref Range Status   05/22/2020 0.1 0.0 - 0.2 x10E3/uL Final       Sodium   Date Value Ref Range Status   05/22/2020 140 134 - 144 mmol/L Final     Potassium   Date Value Ref Range Status   05/22/2020 4.1 3.5 - 5.2 mmol/L Final     Total CO2   Date Value Ref Range Status   05/22/2020 23 20 - 29 mmol/L Final     Chloride   Date Value Ref Range Status   05/22/2020 101 96 - 106 mmol/L Final     Creatinine   Date Value Ref Range Status   05/22/2020 1.10 0.76 - 1.27 mg/dL Final     BUN   Date Value Ref Range Status   05/22/2020 15 6 - 24 mg/dL Final     BUN/Creatinine Ratio   Date Value Ref Range Status    05/22/2020 14 9 - 20 Final     Calcium   Date Value Ref Range Status   05/22/2020 9.2 8.7 - 10.2 mg/dL Final     eGFR Non  Am   Date Value Ref Range Status   05/22/2020 82 >59 mL/min/1.73 Final     Alkaline Phosphatase   Date Value Ref Range Status   05/22/2020 59 39 - 117 IU/L Final     ALT (SGPT)   Date Value Ref Range Status   05/22/2020 18 0 - 44 IU/L Final     AST (SGOT)   Date Value Ref Range Status   05/22/2020 13 0 - 40 IU/L Final     Total Bilirubin   Date Value Ref Range Status   05/22/2020 0.5 0.0 - 1.2 mg/dL Final     Albumin   Date Value Ref Range Status   05/22/2020 4.6 4.0 - 5.0 g/dL Final     A/G Ratio   Date Value Ref Range Status   05/22/2020 2.1 1.2 - 2.2 Final         Imaging Results (Last 7 Days)     ** No results found for the last 168 hours. **            No notes on file    Assessment/Plan   1.  Heartburn: Mostly controlled on once daily Nexium but still seems to have some reflux with laying recumbent    2.  Globus sensation: Frequent episodes with mostly acidic type drinks and laying recumbent.    Plan  I discussed that this may very well be reflux related versus eosinophilic esophagitis.  Will try to settle things down with Carafate before all meals.  He is going to consider an EGD for further evaluation.  He is a little concerned because he just had his ear nose and throat evaluation and apparently this is costing him more than anticipated.  I will have my office investigate what his deductible would be for the EGD.  We will see how he does with the Carafate.  If the EGD is cost prohibitive, could consider an esophagram.    Office follow-up in about 3 months    He will let me know how he does with the Carafate  Usman was seen today for difficulty swallowing, globus sensation and nausea.    Diagnoses and all orders for this visit:    Globus sensation    Heartburn    Other orders  -     sucralfate (CARAFATE) 1 g tablet; Take 1 tablet by mouth 3 (Three) Times a Day Before Meals.  Dissolve 1 tablet in 4 ounces of water and swallow.        I have discussed the above plan with the patient.  They verbalize understanding and are in agreement with the plan.  They have been advised to contact the office for any questions, concerns, or changes related to their health.    Dictated utilizing Dragon dictation

## 2020-06-18 ENCOUNTER — TELEPHONE (OUTPATIENT)
Dept: GASTROENTEROLOGY | Facility: CLINIC | Age: 40
End: 2020-06-18

## 2020-06-18 DIAGNOSIS — R12 HEARTBURN: ICD-10-CM

## 2020-06-18 DIAGNOSIS — R09.89 GLOBUS SENSATION: Primary | ICD-10-CM

## 2020-06-18 RX ORDER — SODIUM CHLORIDE, SODIUM LACTATE, POTASSIUM CHLORIDE, CALCIUM CHLORIDE 600; 310; 30; 20 MG/100ML; MG/100ML; MG/100ML; MG/100ML
30 INJECTION, SOLUTION INTRAVENOUS CONTINUOUS
Status: CANCELLED | OUTPATIENT
Start: 2020-07-31

## 2020-06-18 NOTE — TELEPHONE ENCOUNTER
----- Message from Usman Burrell sent at 6/17/2020  9:04 PM EDT -----  Regarding: Non-Urgent Medical Question  Contact: [phone]  What do I need to do to be put on list for scope its throat is not getting any better

## 2020-06-18 NOTE — TELEPHONE ENCOUNTER
Called pt and pt reports that his throat issues are worsening and he would like to have egd if possible.  He reports he is having a hard time swallowing, he feels like something is in his throat and he constantly has to clear his throat.  Advised pt will send message to Dr Brown.   Pt verb understanding.

## 2020-07-01 ENCOUNTER — TELEPHONE (OUTPATIENT)
Dept: GASTROENTEROLOGY | Facility: CLINIC | Age: 40
End: 2020-07-01

## 2020-07-01 NOTE — TELEPHONE ENCOUNTER
----- Message from Usman Burrell sent at 6/30/2020  9:25 PM EDT -----  Regarding: Non-Urgent Medical Question  Contact: [phone]  Just double checking I didnt miss a call about the scope haven't heard from anyone yet.

## 2020-07-24 ENCOUNTER — TRANSCRIBE ORDERS (OUTPATIENT)
Dept: SLEEP MEDICINE | Facility: HOSPITAL | Age: 40
End: 2020-07-24

## 2020-07-24 DIAGNOSIS — Z01.818 OTHER SPECIFIED PRE-OPERATIVE EXAMINATION: Primary | ICD-10-CM

## 2020-07-29 ENCOUNTER — LAB (OUTPATIENT)
Dept: LAB | Facility: HOSPITAL | Age: 40
End: 2020-07-29

## 2020-07-29 DIAGNOSIS — Z01.818 OTHER SPECIFIED PRE-OPERATIVE EXAMINATION: ICD-10-CM

## 2020-07-29 PROCEDURE — C9803 HOPD COVID-19 SPEC COLLECT: HCPCS

## 2020-07-29 PROCEDURE — U0004 COV-19 TEST NON-CDC HGH THRU: HCPCS

## 2020-07-30 LAB
REF LAB TEST METHOD: NORMAL
SARS-COV-2 RNA RESP QL NAA+PROBE: NOT DETECTED

## 2020-07-31 ENCOUNTER — ANESTHESIA (OUTPATIENT)
Dept: GASTROENTEROLOGY | Facility: HOSPITAL | Age: 40
End: 2020-07-31

## 2020-07-31 ENCOUNTER — ANESTHESIA EVENT (OUTPATIENT)
Dept: GASTROENTEROLOGY | Facility: HOSPITAL | Age: 40
End: 2020-07-31

## 2020-07-31 ENCOUNTER — HOSPITAL ENCOUNTER (OUTPATIENT)
Facility: HOSPITAL | Age: 40
Setting detail: HOSPITAL OUTPATIENT SURGERY
Discharge: HOME | End: 2020-07-31
Attending: INTERNAL MEDICINE | Admitting: INTERNAL MEDICINE

## 2020-07-31 VITALS
OXYGEN SATURATION: 95 % | TEMPERATURE: 97.8 F | HEIGHT: 68 IN | WEIGHT: 209.8 LBS | RESPIRATION RATE: 12 BRPM | BODY MASS INDEX: 31.8 KG/M2 | DIASTOLIC BLOOD PRESSURE: 75 MMHG | HEART RATE: 79 BPM | SYSTOLIC BLOOD PRESSURE: 110 MMHG

## 2020-07-31 DIAGNOSIS — R09.89 GLOBUS SENSATION: ICD-10-CM

## 2020-07-31 DIAGNOSIS — R12 HEARTBURN: ICD-10-CM

## 2020-07-31 PROCEDURE — 88305 TISSUE EXAM BY PATHOLOGIST: CPT | Performed by: INTERNAL MEDICINE

## 2020-07-31 PROCEDURE — 25010000002 PROPOFOL 10 MG/ML EMULSION: Performed by: NURSE ANESTHETIST, CERTIFIED REGISTERED

## 2020-07-31 PROCEDURE — S0260 H&P FOR SURGERY: HCPCS | Performed by: INTERNAL MEDICINE

## 2020-07-31 PROCEDURE — 43239 EGD BIOPSY SINGLE/MULTIPLE: CPT | Performed by: INTERNAL MEDICINE

## 2020-07-31 RX ORDER — LIDOCAINE HYDROCHLORIDE 20 MG/ML
INJECTION, SOLUTION INFILTRATION; PERINEURAL AS NEEDED
Status: DISCONTINUED | OUTPATIENT
Start: 2020-07-31 | End: 2020-07-31 | Stop reason: SURG

## 2020-07-31 RX ORDER — CETIRIZINE HYDROCHLORIDE 10 MG/1
10 TABLET ORAL DAILY
COMMUNITY
End: 2021-05-19 | Stop reason: SDDI

## 2020-07-31 RX ORDER — SODIUM CHLORIDE, SODIUM LACTATE, POTASSIUM CHLORIDE, CALCIUM CHLORIDE 600; 310; 30; 20 MG/100ML; MG/100ML; MG/100ML; MG/100ML
30 INJECTION, SOLUTION INTRAVENOUS CONTINUOUS
Status: DISCONTINUED | OUTPATIENT
Start: 2020-07-31 | End: 2020-07-31 | Stop reason: HOSPADM

## 2020-07-31 RX ORDER — PROPOFOL 10 MG/ML
VIAL (ML) INTRAVENOUS CONTINUOUS PRN
Status: DISCONTINUED | OUTPATIENT
Start: 2020-07-31 | End: 2020-07-31 | Stop reason: SURG

## 2020-07-31 RX ORDER — PROPOFOL 10 MG/ML
VIAL (ML) INTRAVENOUS AS NEEDED
Status: DISCONTINUED | OUTPATIENT
Start: 2020-07-31 | End: 2020-07-31 | Stop reason: SURG

## 2020-07-31 RX ORDER — SODIUM CHLORIDE, SODIUM LACTATE, POTASSIUM CHLORIDE, CALCIUM CHLORIDE 600; 310; 30; 20 MG/100ML; MG/100ML; MG/100ML; MG/100ML
INJECTION, SOLUTION INTRAVENOUS CONTINUOUS PRN
Status: DISCONTINUED | OUTPATIENT
Start: 2020-07-31 | End: 2020-07-31 | Stop reason: SURG

## 2020-07-31 RX ADMIN — SODIUM CHLORIDE, POTASSIUM CHLORIDE, SODIUM LACTATE AND CALCIUM CHLORIDE: 600; 310; 30; 20 INJECTION, SOLUTION INTRAVENOUS at 11:03

## 2020-07-31 RX ADMIN — LIDOCAINE HYDROCHLORIDE 60 MG: 20 INJECTION, SOLUTION INFILTRATION; PERINEURAL at 11:10

## 2020-07-31 RX ADMIN — PROPOFOL 300 MCG/KG/MIN: 10 INJECTION, EMULSION INTRAVENOUS at 11:10

## 2020-07-31 RX ADMIN — PROPOFOL 50 MG: 10 INJECTION, EMULSION INTRAVENOUS at 11:10

## 2020-07-31 NOTE — ANESTHESIA POSTPROCEDURE EVALUATION
Patient: Usman Burrell    Procedure Summary     Date:  07/31/20 Room / Location:   [facility]    Anesthesia Start:  1103 Anesthesia Stop:  1131    Procedure:  ESOPHAGOGASTRODUODENOSCOPY with cold biopsies and cold biopsy polypectomy (N/A Esophagus) Diagnosis:       Globus sensation      Heartburn      (Globus sensation [R09.89])      (Heartburn [R12])    Surgeon:  Cristina Brown MD Provider:  Gabriela Sweeney MD    Anesthesia Type:  MAC ASA Status:  2          Anesthesia Type: MAC    Vitals  Vitals Value Taken Time   /75 7/31/2020 11:51 AM   Temp     Pulse 79 7/31/2020 11:51 AM   Resp 12 7/31/2020 11:51 AM   SpO2 95 % 7/31/2020 11:51 AM           Post Anesthesia Care and Evaluation    Patient location during evaluation: PACU  Patient participation: complete - patient participated  Level of consciousness: awake and alert  Pain management: adequate  Airway patency: patent  Anesthetic complications: No anesthetic complications  PONV Status: none  Cardiovascular status: acceptable  Respiratory status: acceptable  Hydration status: acceptable

## 2020-07-31 NOTE — ANESTHESIA PREPROCEDURE EVALUATION
Anesthesia Evaluation     Patient summary reviewed and Nursing notes reviewed   no history of anesthetic complications:  NPO Solid Status: > 8 hours  NPO Liquid Status: > 4 hours           Airway   Mallampati: II  TM distance: >3 FB  Neck ROM: full  No difficulty expected  Dental - normal exam     Pulmonary - normal exam    breath sounds clear to auscultation  (+) asthma (spoorts-induced, no recent MDI use),  Cardiovascular - negative cardio ROS and normal exam    Rhythm: regular  Rate: normal        Neuro/Psych  (+) psychiatric history Anxiety,     GI/Hepatic/Renal/Endo    (+)  GERD,      Musculoskeletal (-) negative ROS    Abdominal   (+) obese,    Substance History - negative use     OB/GYN negative ob/gyn ROS         Other - negative ROS                       Anesthesia Plan    ASA 2     MAC       Anesthetic plan, all risks, benefits, and alternatives have been provided, discussed and informed consent has been obtained with: patient.

## 2020-08-03 LAB
LAB AP CASE REPORT: NORMAL
PATH REPORT.FINAL DX SPEC: NORMAL
PATH REPORT.GROSS SPEC: NORMAL

## 2020-08-04 NOTE — PROGRESS NOTES
The polyps biopsy during his EGD were benign fundic gland polyps.    No significant inflammation, no eosinophilic esophagitis, no evidence of reflux or concerning cell changes.    Continue Carafate    Continue Zyrtec    May benefit from seeing an allergist.

## 2020-08-05 ENCOUNTER — TELEPHONE (OUTPATIENT)
Dept: GASTROENTEROLOGY | Facility: CLINIC | Age: 40
End: 2020-08-05

## 2020-08-05 NOTE — TELEPHONE ENCOUNTER
----- Message from Usman Burrell sent at 8/5/2020 11:45 AM EDT -----  Regarding: Test Results Question  Contact: [phone]  I didn't understand the test results is there anything that can be done with the issue I'm having?    Thanks.  Usman

## 2020-08-05 NOTE — TELEPHONE ENCOUNTER
----- Message from Cristina Brown MD sent at 8/4/2020 11:46 AM EDT -----  The polyps biopsy during his EGD were benign fundic gland polyps.    No significant inflammation, no eosinophilic esophagitis, no evidence of reflux or concerning cell changes.    Continue Carafate    Continue Zyrtec    May benefit from seeing an allergist.

## 2020-09-04 ENCOUNTER — TELEPHONE (OUTPATIENT)
Dept: GASTROENTEROLOGY | Facility: CLINIC | Age: 40
End: 2020-09-04

## 2020-09-04 DIAGNOSIS — R09.89 GLOBUS SENSATION: Primary | ICD-10-CM

## 2020-09-04 NOTE — TELEPHONE ENCOUNTER
----- Message from Usman Burrell sent at 9/3/2020  9:43 PM EDT -----  Regarding: Visit Follow-Up Question  Contact: [phone]  Do I still need to follow up since I was told to see allergy doctor, I am still having issue and wasn't sure what allergy doctor to go to can you refer?

## 2020-09-09 ENCOUNTER — TELEPHONE (OUTPATIENT)
Dept: GASTROENTEROLOGY | Facility: CLINIC | Age: 40
End: 2020-09-09

## 2020-09-09 DIAGNOSIS — R09.89 GLOBUS SENSATION: Primary | ICD-10-CM

## 2020-09-09 DIAGNOSIS — K21.9 GASTROESOPHAGEAL REFLUX DISEASE WITHOUT ESOPHAGITIS: ICD-10-CM

## 2020-09-09 NOTE — TELEPHONE ENCOUNTER
Records received (duplicate pages - so only consecutive pages scanned).  See media tab.  Message to DR Brown.

## 2020-09-09 NOTE — TELEPHONE ENCOUNTER
----- Message from Deo Gallardo sent at 9/9/2020  8:32 AM EDT -----  Regarding: call back  Contact: [phone]  Pt called back.

## 2020-09-09 NOTE — TELEPHONE ENCOUNTER
Call to pt.  States understood has been referred to ENT.  (see note of  9/4).  States saw ENT before being referred to DR Brown.  Had scope down his nose and was cleared from ENT standpoint.      Call to Dr Junior Martin's office @ [phone] and spoke with Caroline.  Request records be faxed to [phone].     Pt requests cancel 9/10 appt with DR Brown 2nd no definitive answers at this time until above records received.

## 2020-09-10 RX ORDER — ESOMEPRAZOLE MAGNESIUM 40 MG/1
CAPSULE, DELAYED RELEASE ORAL
Qty: 30 CAPSULE | Refills: 0 | Status: SHIPPED | OUTPATIENT
Start: 2020-09-10 | End: 2020-10-09

## 2020-09-10 NOTE — TELEPHONE ENCOUNTER
I reviewed his ENTs notes.    Given persistence of symptoms, I think an esophagram can rule out motility issues of the esophagus.    If that is normal, then I think allergy evaluation is next

## 2020-09-11 NOTE — TELEPHONE ENCOUNTER
Call to pt.  Advise per Dr Brown note.  Verb understanding.     Advise [facility] will contact to arrange.

## 2020-09-15 ENCOUNTER — TRANSCRIBE ORDERS (OUTPATIENT)
Dept: ADMINISTRATIVE | Facility: HOSPITAL | Age: 40
End: 2020-09-15

## 2020-09-15 DIAGNOSIS — Z01.818 OTHER SPECIFIED PRE-OPERATIVE EXAMINATION: Primary | ICD-10-CM

## 2020-09-22 ENCOUNTER — LAB (OUTPATIENT)
Dept: LAB | Facility: HOSPITAL | Age: 40
End: 2020-09-22

## 2020-09-22 DIAGNOSIS — Z01.818 OTHER SPECIFIED PRE-OPERATIVE EXAMINATION: ICD-10-CM

## 2020-09-22 PROCEDURE — U0004 COV-19 TEST NON-CDC HGH THRU: HCPCS

## 2020-09-22 PROCEDURE — C9803 HOPD COVID-19 SPEC COLLECT: HCPCS

## 2020-09-23 LAB — SARS-COV-2 RNA RESP QL NAA+PROBE: NOT DETECTED

## 2020-09-24 ENCOUNTER — HOSPITAL ENCOUNTER (OUTPATIENT)
Dept: GENERAL RADIOLOGY | Facility: HOSPITAL | Age: 40
Discharge: HOME | End: 2020-09-24
Admitting: INTERNAL MEDICINE

## 2020-09-24 DIAGNOSIS — R09.89 GLOBUS SENSATION: ICD-10-CM

## 2020-09-24 DIAGNOSIS — K21.9 GASTROESOPHAGEAL REFLUX DISEASE WITHOUT ESOPHAGITIS: ICD-10-CM

## 2020-09-24 PROCEDURE — 74220 X-RAY XM ESOPHAGUS 1CNTRST: CPT

## 2020-09-25 DIAGNOSIS — R09.89 GLOBUS SENSATION: Primary | ICD-10-CM

## 2020-09-25 NOTE — PROGRESS NOTES
Esophagram shows some reflux, small sliding hiatal hernia but no dysmotility.  No issue identified to cause symptoms    Continue ppi, carafate for heartburn/reflux symptoms    Referral made to allergist for further eval

## 2020-09-28 ENCOUNTER — TELEPHONE (OUTPATIENT)
Dept: GASTROENTEROLOGY | Facility: CLINIC | Age: 40
End: 2020-09-28

## 2020-09-28 NOTE — TELEPHONE ENCOUNTER
----- Message from Cristina Brown MD sent at 9/25/2020  9:22 AM EDT -----  Esophagram shows some reflux, small sliding hiatal hernia but no dysmotility.  No issue identified to cause symptoms    Continue ppi, carafate for heartburn/reflux symptoms    Referral made to allergist for further eval

## 2020-10-09 RX ORDER — ESOMEPRAZOLE MAGNESIUM 40 MG/1
CAPSULE, DELAYED RELEASE ORAL
Qty: 30 CAPSULE | Refills: 0 | Status: SHIPPED | OUTPATIENT
Start: 2020-10-09 | End: 2022-03-09 | Stop reason: SDUPTHER

## 2020-10-14 ENCOUNTER — TELEPHONE (OUTPATIENT)
Dept: GASTROENTEROLOGY | Facility: CLINIC | Age: 40
End: 2020-10-14

## 2020-10-14 NOTE — TELEPHONE ENCOUNTER
----- Message from Usman Burrell sent at 10/13/2020  9:50 AM EDT -----  Regarding: Visit Follow-Up Question  Contact: [phone]  i was told to send a message if i wasn't contacted by the allergy doctor i was referred to and i havent recieved a call yet thanks

## 2020-10-19 ENCOUNTER — TELEPHONE (OUTPATIENT)
Dept: GASTROENTEROLOGY | Facility: CLINIC | Age: 40
End: 2020-10-19

## 2020-10-19 NOTE — TELEPHONE ENCOUNTER
----- Message from Usman Burrell sent at 10/19/2020  4:15 PM EDT -----  Regarding: Visit Follow-Up Question  Contact: [phone]  Was wondering what the allergy doctors name was still haven't been contacted by them.

## 2020-10-20 NOTE — TELEPHONE ENCOUNTER
Arash Shah  to Me          10:29 AM  I called the patient and gave him information on his scheduled appointment with ENT Dr.Stephen Mendez on 11/2 at 8:00 am at the [address] [place] [address] location.  Records are faxed to ([phone])     **Message noted.  Marry Cantrell RN.

## 2020-11-30 ENCOUNTER — OFFICE VISIT (OUTPATIENT)
Dept: FAMILY MEDICINE CLINIC | Facility: CLINIC | Age: 40
End: 2020-11-30

## 2020-11-30 VITALS
RESPIRATION RATE: 16 BRPM | OXYGEN SATURATION: 100 % | HEART RATE: 90 BPM | DIASTOLIC BLOOD PRESSURE: 80 MMHG | HEIGHT: 68 IN | TEMPERATURE: 97.6 F | SYSTOLIC BLOOD PRESSURE: 100 MMHG | WEIGHT: 217.6 LBS | BODY MASS INDEX: 32.98 KG/M2

## 2020-11-30 DIAGNOSIS — R00.2 PALPITATIONS: ICD-10-CM

## 2020-11-30 DIAGNOSIS — K21.9 GASTROESOPHAGEAL REFLUX DISEASE WITHOUT ESOPHAGITIS: ICD-10-CM

## 2020-11-30 DIAGNOSIS — E55.9 VITAMIN D DEFICIENCY: ICD-10-CM

## 2020-11-30 DIAGNOSIS — E53.8 LOW SERUM VITAMIN B12: ICD-10-CM

## 2020-11-30 DIAGNOSIS — E53.8 LOW FOLIC ACID: Primary | ICD-10-CM

## 2020-11-30 DIAGNOSIS — F41.1 GENERALIZED ANXIETY DISORDER: ICD-10-CM

## 2020-11-30 PROCEDURE — 99214 OFFICE O/P EST MOD 30 MIN: CPT | Performed by: PHYSICIAN ASSISTANT

## 2020-11-30 RX ORDER — FOLIC ACID 1 MG/1
1 TABLET ORAL DAILY
Qty: 90 TABLET | Refills: 1 | Status: SHIPPED | OUTPATIENT
Start: 2020-11-30 | End: 2022-03-09

## 2020-11-30 RX ORDER — ESCITALOPRAM OXALATE 5 MG/1
5 TABLET ORAL DAILY
Qty: 30 TABLET | Refills: 1 | Status: SHIPPED | OUTPATIENT
Start: 2020-11-30 | End: 2020-12-21

## 2020-11-30 NOTE — PATIENT INSTRUCTIONS
Escitalopram tablets  What is this medicine?  ESCITALOPRAM (es sye TIA oh pram) is used to treat depression and certain types of anxiety.  This medicine may be used for other purposes; ask your health care provider or pharmacist if you have questions.  COMMON BRAND NAME(S): Lexapro  What should I tell my health care provider before I take this medicine?  They need to know if you have any of these conditions:  · bipolar disorder or a family history of bipolar disorder  · diabetes  · glaucoma  · heart disease  · kidney or liver disease  · receiving electroconvulsive therapy  · seizures (convulsions)  · suicidal thoughts, plans, or attempt by you or a family member  · an unusual or allergic reaction to escitalopram, the related drug citalopram, other medicines, foods, dyes, or preservatives  · pregnant or trying to become pregnant  · breast-feeding  How should I use this medicine?  Take this medicine by mouth with a glass of water. Follow the directions on the prescription label. You can take it with or without food. If it upsets your stomach, take it with food. Take your medicine at regular intervals. Do not take it more often than directed. Do not stop taking this medicine suddenly except upon the advice of your doctor. Stopping this medicine too quickly may cause serious side effects or your condition may worsen.  A special MedGuide will be given to you by the pharmacist with each prescription and refill. Be sure to read this information carefully each time.  Talk to your pediatrician regarding the use of this medicine in children. Special care may be needed.  Overdosage: If you think you have taken too much of this medicine contact a poison control center or emergency room at once.  NOTE: This medicine is only for you. Do not share this medicine with others.  What if I miss a dose?  If you miss a dose, take it as soon as you can. If it is almost time for your next dose, take only that dose. Do not take double or  extra doses.  What may interact with this medicine?  Do not take this medicine with any of the following medications:  · certain medicines for fungal infections like fluconazole, itraconazole, ketoconazole, posaconazole, voriconazole  · cisapride  · citalopram  · dronedarone  · linezolid  · MAOIs like Carbex, Eldepryl, Marplan, Nardil, and Parnate  · methylene blue (injected into a vein)  · pimozide  · thioridazine  This medicine may also interact with the following medications:  · alcohol  · amphetamines  · aspirin and aspirin-like medicines  · carbamazepine  · certain medicines for depression, anxiety, or psychotic disturbances  · certain medicines for migraine headache like almotriptan, eletriptan, frovatriptan, naratriptan, rizatriptan, sumatriptan, zolmitriptan  · certain medicines for sleep  · certain medicines that treat or prevent blood clots like warfarin, enoxaparin, dalteparin  · cimetidine  · diuretics  · dofetilide  · fentanyl  · furazolidone  · isoniazid  · lithium  · metoprolol  · NSAIDs, medicines for pain and inflammation, like ibuprofen or naproxen  · other medicines that prolong the QT interval (cause an abnormal heart rhythm)  · procarbazine  · rasagiline  · supplements like [place]'s wort, kava kava, valerian  · tramadol  · tryptophan  · ziprasidone  This list may not describe all possible interactions. Give your health care provider a list of all the medicines, herbs, non-prescription drugs, or dietary supplements you use. Also tell them if you smoke, drink alcohol, or use illegal drugs. Some items may interact with your medicine.  What should I watch for while using this medicine?  Tell your doctor if your symptoms do not get better or if they get worse. Visit your doctor or health care professional for regular checks on your progress. Because it may take several weeks to see the full effects of this medicine, it is important to continue your treatment as prescribed by your doctor.  Patients  and their families should watch out for new or worsening thoughts of suicide or depression. Also watch out for sudden changes in feelings such as feeling anxious, agitated, panicky, irritable, hostile, aggressive, impulsive, severely restless, overly excited and hyperactive, or not being able to sleep. If this happens, especially at the beginning of treatment or after a change in dose, call your health care professional.  You may get drowsy or dizzy. Do not drive, use machinery, or do anything that needs mental alertness until you know how this medicine affects you. Do not stand or sit up quickly, especially if you are an older patient. This reduces the risk of dizzy or fainting spells. Alcohol may interfere with the effect of this medicine. Avoid alcoholic drinks.  Your mouth may get dry. Chewing sugarless gum or sucking hard candy, and drinking plenty of water may help. Contact your doctor if the problem does not go away or is severe.  What side effects may I notice from receiving this medicine?  Side effects that you should report to your doctor or health care professional as soon as possible:  · allergic reactions like skin rash, itching or hives, swelling of the face, lips, or tongue  · anxious  · black, tarry stools  · changes in vision  · confusion  · elevated mood, decreased need for sleep, racing thoughts, impulsive behavior  · eye pain  · fast, irregular heartbeat  · feeling faint or lightheaded, falls  · feeling agitated, angry, or irritable  · hallucination, loss of contact with reality  · loss of balance or coordination  · loss of memory  · painful or prolonged erections  · restlessness, pacing, inability to keep still  · seizures  · stiff muscles  · suicidal thoughts or other mood changes  · trouble sleeping  · unusual bleeding or bruising  · unusually weak or tired  · vomiting  Side effects that usually do not require medical attention (report to your doctor or health care professional if they  continue or are bothersome):  · changes in appetite  · change in sex drive or performance  · headache  · increased sweating  · indigestion, nausea  · tremors  This list may not describe all possible side effects. Call your doctor for medical advice about side effects. You may report side effects to FDA at 5-674-KCA-6761.  Where should I keep my medicine?  Keep out of reach of children.  Store at room temperature between 15 and 30 degrees C (59 and 86 degrees F). Throw away any unused medicine after the expiration date.  NOTE: This sheet is a summary. It may not cover all possible information. If you have questions about this medicine, talk to your doctor, pharmacist, or health care provider.  © 2020 [employer] (2019-12-09 11:21:44)

## 2020-11-30 NOTE — PROGRESS NOTES
"Subjective   Usman Burrell is a 40 y.o. male.     History of Present Illness   Usman Burrell 40 y.o. male /80 (BP Location: Left arm, Patient Position: Sitting, Cuff Size: Adult)   Pulse 90   Temp 97.6 °F (36.4 °C)   Resp 16   Ht 172.7 cm (67.99\")   Wt 98.7 kg (217 lb 9.6 oz)   SpO2 100%   BMI 33.09 kg/m²  who presents today for anxiety and heart racing.  he has a history of   Patient Active Problem List   Diagnosis   • Varicose veins of lower extremity with pain, right   • Gastroesophageal reflux disease without esophagitis   • Vitamin D deficiency   • Low folic acid   • Low serum vitamin B12   • Globus sensation   • Heartburn   .    Here for heart racing. Can have once a week for last few weeks.   101-110 heart rate--can last 15 minutes to 1 hour. Palpitations--fast heat rate  Can get chest pressure during this. No SOA. No skips or irregular.   Exertional SOA ---this is new----happened on Sat.  Dad had heart disease  I will update labs and check thyroid again. Ok refer cardiologist.   Just get TSH and free T4  Did see GI and had EGD-----then sent to Allergist--had testing and ? Immunotherapy?   Also has anxiety and panic attacks.  Was on Buspar in past. No help  Usman Burrell male 40 y.o., /80 (BP Location: Left arm, Patient Position: Sitting, Cuff Size: Adult)   Pulse 90   Temp 97.6 °F (36.4 °C)   Resp 16   Ht 172.7 cm (67.99\")   Wt 98.7 kg (217 lb 9.6 oz)   SpO2 100%   BMI 33.09 kg/m²   who presents today for new evaluation and treatment of Depression, Anxiety and Panic Attacks.  He reports difficulty falling asleep, anxiety, anhedonia, impaired concentration, excessive worry and unable to relax. Onset of symptoms was approximately several years ago.  He denies current suicidal and homicidal ideation. Risk factors are family history of anxiety and or depression and lifestyle of multiple roles.  Previous treatment includes Buspar PRN--no help.  He complains of the following medication side " effects: none.  The patient declines to go to counseling..      The following portions of the patient's history were reviewed and updated as appropriate: allergies, current medications, past family history, past medical history, past social history, past surgical history and problem list.    Review of Systems   HENT: Positive for trouble swallowing.    Respiratory: Positive for chest tightness.    Cardiovascular: Positive for palpitations.   Skin: Positive for color change.   Allergic/Immunologic: Positive for environmental allergies and food allergies.   Psychiatric/Behavioral: Positive for decreased concentration, dysphoric mood and sleep disturbance. The patient is nervous/anxious.        Objective   Physical Exam  Vitals signs and nursing note reviewed.   Constitutional:       General: He is not in acute distress.     Appearance: Normal appearance. He is well-developed. He is not ill-appearing, toxic-appearing or diaphoretic.   HENT:      Head: Normocephalic and atraumatic. No right periorbital erythema or left periorbital erythema.      Right Ear: External ear normal.      Left Ear: External ear normal.      Nose: Nose normal.   Eyes:      General: No scleral icterus.        Right eye: No discharge.         Left eye: No discharge.      Conjunctiva/sclera: Conjunctivae normal.      Pupils: Pupils are equal, round, and reactive to light.   Neck:      Musculoskeletal: Normal range of motion and neck supple.   Cardiovascular:      Rate and Rhythm: Normal rate and regular rhythm.      Heart sounds: Normal heart sounds. No murmur.   Pulmonary:      Effort: Pulmonary effort is normal.   Abdominal:      Palpations: Abdomen is soft.      Tenderness: There is no abdominal tenderness.   Musculoskeletal: Normal range of motion.         General: No tenderness or deformity.   Skin:     General: Skin is warm and dry.      Coloration: Skin is not jaundiced.      Findings: No erythema or rash.   Neurological:      General: No  focal deficit present.      Mental Status: He is alert and oriented to person, place, and time. Mental status is at baseline.      Cranial Nerves: No cranial nerve deficit.      Motor: No tremor, atrophy or abnormal muscle tone.      Coordination: Coordination normal.      Deep Tendon Reflexes: Reflexes are normal and symmetric. Reflexes normal.      Reflex Scores:       Tricep reflexes are 2+ on the right side and 2+ on the left side.       Bicep reflexes are 2+ on the right side and 2+ on the left side.       Brachioradialis reflexes are 2+ on the right side and 2+ on the left side.       Patellar reflexes are 2+ on the right side and 2+ on the left side.       Achilles reflexes are 2+ on the right side and 2+ on the left side.  Psychiatric:         Behavior: Behavior normal.         Thought Content: Thought content normal.         Judgment: Judgment normal.         Assessment/Plan   Diagnoses and all orders for this visit:    1. Low folic acid (Primary)  -     Comprehensive Metabolic Panel  -     CBC & Differential  -     T4, Free  -     TSH  -     Vitamin D 25 Hydroxy  -     Vitamin B12  -     Folate  -     Ambulatory Referral to Cardiology  -     Adult Transthoracic Echo Complete W/ Cont if Necessary Per Protocol; Future  -     Holter Monitor - 24 Hour; Future    2. Gastroesophageal reflux disease without esophagitis  -     Comprehensive Metabolic Panel  -     CBC & Differential  -     T4, Free  -     TSH  -     Vitamin D 25 Hydroxy  -     Vitamin B12  -     Folate  -     Ambulatory Referral to Cardiology  -     Adult Transthoracic Echo Complete W/ Cont if Necessary Per Protocol; Future  -     Holter Monitor - 24 Hour; Future    3. Low serum vitamin B12  -     Comprehensive Metabolic Panel  -     CBC & Differential  -     T4, Free  -     TSH  -     Vitamin D 25 Hydroxy  -     Vitamin B12  -     Folate  -     Ambulatory Referral to Cardiology  -     Adult Transthoracic Echo Complete W/ Cont if Necessary Per  Protocol; Future  -     Holter Monitor - 24 Hour; Future    4. Vitamin D deficiency  -     Comprehensive Metabolic Panel  -     CBC & Differential  -     T4, Free  -     TSH  -     Vitamin D 25 Hydroxy  -     Vitamin B12  -     Folate  -     Ambulatory Referral to Cardiology  -     Adult Transthoracic Echo Complete W/ Cont if Necessary Per Protocol; Future  -     Holter Monitor - 24 Hour; Future    5. Generalized anxiety disorder  -     Comprehensive Metabolic Panel  -     CBC & Differential  -     T4, Free  -     TSH  -     Vitamin D 25 Hydroxy  -     Vitamin B12  -     Folate  -     Ambulatory Referral to Cardiology  -     Adult Transthoracic Echo Complete W/ Cont if Necessary Per Protocol; Future  -     Holter Monitor - 24 Hour; Future    6. Palpitations  -     Comprehensive Metabolic Panel  -     CBC & Differential  -     T4, Free  -     TSH  -     Vitamin D 25 Hydroxy  -     Vitamin B12  -     Folate  -     Ambulatory Referral to Cardiology  -     Adult Transthoracic Echo Complete W/ Cont if Necessary Per Protocol; Future  -     Holter Monitor - 24 Hour; Future    Other orders  -     folic acid (FOLVITE) 1 MG tablet; Take 1 tablet by mouth Daily.  Dispense: 90 tablet; Refill: 1  -     escitalopram (Lexapro) 5 MG tablet; Take 1 tablet by mouth Daily. Start with 1/2 tab PO daily for 4 days then one po daily for stress  Dispense: 30 tablet; Refill: 1        Need f/u labs on B12, folate, vit D labs--taking all---  Refer cardiologist  Get echo and Holter d/t palpitations and tachycardia  Start Lexapro and f/u 3 weeks  Avoid caffeine      Answers for HPI/ROS submitted by the patient on 11/30/2020   What is the primary reason for your visit?: Other  Please describe your symptoms.: Heart racing anxiety  Have you had these symptoms before?: No  How long have you been having these symptoms?: Greater than 2 weeks  Please describe any probable cause for these symptoms. : Anxiety

## 2020-12-01 ENCOUNTER — TELEPHONE (OUTPATIENT)
Dept: FAMILY MEDICINE CLINIC | Facility: CLINIC | Age: 40
End: 2020-12-01

## 2020-12-01 LAB
25(OH)D3+25(OH)D2 SERPL-MCNC: 28.7 NG/ML (ref 30–100)
ALBUMIN SERPL-MCNC: 4.9 G/DL (ref 4–5)
ALBUMIN/GLOB SERPL: 2 {RATIO} (ref 1.2–2.2)
ALP SERPL-CCNC: 77 IU/L (ref 39–117)
ALT SERPL-CCNC: 27 IU/L (ref 0–44)
AST SERPL-CCNC: 17 IU/L (ref 0–40)
BASOPHILS # BLD AUTO: 0.1 X10E3/UL (ref 0–0.2)
BASOPHILS NFR BLD AUTO: 1 %
BILIRUB SERPL-MCNC: 0.3 MG/DL (ref 0–1.2)
BUN SERPL-MCNC: 18 MG/DL (ref 6–24)
BUN/CREAT SERPL: 17 (ref 9–20)
CALCIUM SERPL-MCNC: 9.6 MG/DL (ref 8.7–10.2)
CHLORIDE SERPL-SCNC: 104 MMOL/L (ref 96–106)
CO2 SERPL-SCNC: 25 MMOL/L (ref 20–29)
CREAT SERPL-MCNC: 1.09 MG/DL (ref 0.76–1.27)
EOSINOPHIL # BLD AUTO: 0.1 X10E3/UL (ref 0–0.4)
EOSINOPHIL NFR BLD AUTO: 2 %
ERYTHROCYTE [DISTWIDTH] IN BLOOD BY AUTOMATED COUNT: 12.4 % (ref 11.6–15.4)
FOLATE SERPL-MCNC: >20 NG/ML
GLOBULIN SER CALC-MCNC: 2.4 G/DL (ref 1.5–4.5)
GLUCOSE SERPL-MCNC: 91 MG/DL (ref 65–99)
HCT VFR BLD AUTO: 46 % (ref 37.5–51)
HGB BLD-MCNC: 16.4 G/DL (ref 13–17.7)
IMM GRANULOCYTES # BLD AUTO: 0 X10E3/UL (ref 0–0.1)
IMM GRANULOCYTES NFR BLD AUTO: 0 %
LYMPHOCYTES # BLD AUTO: 1 X10E3/UL (ref 0.7–3.1)
LYMPHOCYTES NFR BLD AUTO: 20 %
MCH RBC QN AUTO: 33.1 PG (ref 26.6–33)
MCHC RBC AUTO-ENTMCNC: 35.7 G/DL (ref 31.5–35.7)
MCV RBC AUTO: 93 FL (ref 79–97)
MONOCYTES # BLD AUTO: 0.7 X10E3/UL (ref 0.1–0.9)
MONOCYTES NFR BLD AUTO: 15 %
NEUTROPHILS # BLD AUTO: 2.9 X10E3/UL (ref 1.4–7)
NEUTROPHILS NFR BLD AUTO: 62 %
PLATELET # BLD AUTO: 192 X10E3/UL (ref 150–450)
POTASSIUM SERPL-SCNC: 4.5 MMOL/L (ref 3.5–5.2)
PROT SERPL-MCNC: 7.3 G/DL (ref 6–8.5)
RBC # BLD AUTO: 4.96 X10E6/UL (ref 4.14–5.8)
SODIUM SERPL-SCNC: 143 MMOL/L (ref 134–144)
T4 FREE SERPL-MCNC: 1.4 NG/DL (ref 0.82–1.77)
TSH SERPL DL<=0.005 MIU/L-ACNC: 1.72 UIU/ML (ref 0.45–4.5)
VIT B12 SERPL-MCNC: 653 PG/ML (ref 232–1245)
WBC # BLD AUTO: 4.7 X10E3/UL (ref 3.4–10.8)

## 2020-12-01 NOTE — TELEPHONE ENCOUNTER
----- Message from Esther Patel PA-C sent at 12/1/2020  7:32 AM EST -----  Continue B12 and folic acid levels are much improved and at goal.  Labs show low Vitamin D levels.  I want you to add OTC Vitamin D 2,000 I.U. Daily.  Other labs are in range.

## 2020-12-08 ENCOUNTER — TELEPHONE (OUTPATIENT)
Dept: GASTROENTEROLOGY | Facility: CLINIC | Age: 40
End: 2020-12-08

## 2020-12-21 ENCOUNTER — OFFICE VISIT (OUTPATIENT)
Dept: FAMILY MEDICINE CLINIC | Facility: CLINIC | Age: 40
End: 2020-12-21

## 2020-12-21 VITALS
RESPIRATION RATE: 16 BRPM | HEART RATE: 88 BPM | SYSTOLIC BLOOD PRESSURE: 110 MMHG | OXYGEN SATURATION: 100 % | WEIGHT: 220 LBS | BODY MASS INDEX: 33.34 KG/M2 | HEIGHT: 68 IN | DIASTOLIC BLOOD PRESSURE: 80 MMHG | TEMPERATURE: 97.6 F

## 2020-12-21 DIAGNOSIS — F41.8 SITUATIONAL ANXIETY: ICD-10-CM

## 2020-12-21 DIAGNOSIS — E53.8 LOW FOLIC ACID: Primary | ICD-10-CM

## 2020-12-21 DIAGNOSIS — F41.1 GENERALIZED ANXIETY DISORDER: ICD-10-CM

## 2020-12-21 DIAGNOSIS — E55.9 VITAMIN D DEFICIENCY: ICD-10-CM

## 2020-12-21 PROCEDURE — 99213 OFFICE O/P EST LOW 20 MIN: CPT | Performed by: PHYSICIAN ASSISTANT

## 2020-12-21 RX ORDER — MONTELUKAST SODIUM 10 MG/1
10 TABLET ORAL
COMMUNITY
Start: 2020-11-29 | End: 2022-10-04

## 2020-12-21 RX ORDER — HYDROXYZINE HYDROCHLORIDE 25 MG/1
25 TABLET, FILM COATED ORAL 3 TIMES DAILY PRN
Qty: 30 TABLET | Refills: 1 | Status: SHIPPED | OUTPATIENT
Start: 2020-12-21 | End: 2021-04-19 | Stop reason: SDUPTHER

## 2020-12-21 RX ORDER — ESCITALOPRAM OXALATE 10 MG/1
10 TABLET ORAL DAILY
Qty: 30 TABLET | Refills: 1 | Status: SHIPPED | OUTPATIENT
Start: 2020-12-21 | End: 2021-01-11 | Stop reason: SDUPTHER

## 2020-12-21 RX ORDER — FLUTICASONE PROPIONATE 50 MCG
2 SPRAY, SUSPENSION (ML) NASAL AS NEEDED
COMMUNITY
Start: 2020-11-02

## 2020-12-21 NOTE — PATIENT INSTRUCTIONS
Escitalopram tablets  What is this medicine?  ESCITALOPRAM (es sye TIA oh pram) is used to treat depression and certain types of anxiety.  This medicine may be used for other purposes; ask your health care provider or pharmacist if you have questions.  COMMON BRAND NAME(S): Lexapro  What should I tell my health care provider before I take this medicine?  They need to know if you have any of these conditions:  · bipolar disorder or a family history of bipolar disorder  · diabetes  · glaucoma  · heart disease  · kidney or liver disease  · receiving electroconvulsive therapy  · seizures (convulsions)  · suicidal thoughts, plans, or attempt by you or a family member  · an unusual or allergic reaction to escitalopram, the related drug citalopram, other medicines, foods, dyes, or preservatives  · pregnant or trying to become pregnant  · breast-feeding  How should I use this medicine?  Take this medicine by mouth with a glass of water. Follow the directions on the prescription label. You can take it with or without food. If it upsets your stomach, take it with food. Take your medicine at regular intervals. Do not take it more often than directed. Do not stop taking this medicine suddenly except upon the advice of your doctor. Stopping this medicine too quickly may cause serious side effects or your condition may worsen.  A special MedGuide will be given to you by the pharmacist with each prescription and refill. Be sure to read this information carefully each time.  Talk to your pediatrician regarding the use of this medicine in children. Special care may be needed.  Overdosage: If you think you have taken too much of this medicine contact a poison control center or emergency room at once.  NOTE: This medicine is only for you. Do not share this medicine with others.  What if I miss a dose?  If you miss a dose, take it as soon as you can. If it is almost time for your next dose, take only that dose. Do not take double or  extra doses.  What may interact with this medicine?  Do not take this medicine with any of the following medications:  · certain medicines for fungal infections like fluconazole, itraconazole, ketoconazole, posaconazole, voriconazole  · cisapride  · citalopram  · dronedarone  · linezolid  · MAOIs like Carbex, Eldepryl, Marplan, Nardil, and Parnate  · methylene blue (injected into a vein)  · pimozide  · thioridazine  This medicine may also interact with the following medications:  · alcohol  · amphetamines  · aspirin and aspirin-like medicines  · carbamazepine  · certain medicines for depression, anxiety, or psychotic disturbances  · certain medicines for migraine headache like almotriptan, eletriptan, frovatriptan, naratriptan, rizatriptan, sumatriptan, zolmitriptan  · certain medicines for sleep  · certain medicines that treat or prevent blood clots like warfarin, enoxaparin, dalteparin  · cimetidine  · diuretics  · dofetilide  · fentanyl  · furazolidone  · isoniazid  · lithium  · metoprolol  · NSAIDs, medicines for pain and inflammation, like ibuprofen or naproxen  · other medicines that prolong the QT interval (cause an abnormal heart rhythm)  · procarbazine  · rasagiline  · supplements like [place]'s wort, kava kava, valerian  · tramadol  · tryptophan  · ziprasidone  This list may not describe all possible interactions. Give your health care provider a list of all the medicines, herbs, non-prescription drugs, or dietary supplements you use. Also tell them if you smoke, drink alcohol, or use illegal drugs. Some items may interact with your medicine.  What should I watch for while using this medicine?  Tell your doctor if your symptoms do not get better or if they get worse. Visit your doctor or health care professional for regular checks on your progress. Because it may take several weeks to see the full effects of this medicine, it is important to continue your treatment as prescribed by your doctor.  Patients  and their families should watch out for new or worsening thoughts of suicide or depression. Also watch out for sudden changes in feelings such as feeling anxious, agitated, panicky, irritable, hostile, aggressive, impulsive, severely restless, overly excited and hyperactive, or not being able to sleep. If this happens, especially at the beginning of treatment or after a change in dose, call your health care professional.  You may get drowsy or dizzy. Do not drive, use machinery, or do anything that needs mental alertness until you know how this medicine affects you. Do not stand or sit up quickly, especially if you are an older patient. This reduces the risk of dizzy or fainting spells. Alcohol may interfere with the effect of this medicine. Avoid alcoholic drinks.  Your mouth may get dry. Chewing sugarless gum or sucking hard candy, and drinking plenty of water may help. Contact your doctor if the problem does not go away or is severe.  What side effects may I notice from receiving this medicine?  Side effects that you should report to your doctor or health care professional as soon as possible:  · allergic reactions like skin rash, itching or hives, swelling of the face, lips, or tongue  · anxious  · black, tarry stools  · changes in vision  · confusion  · elevated mood, decreased need for sleep, racing thoughts, impulsive behavior  · eye pain  · fast, irregular heartbeat  · feeling faint or lightheaded, falls  · feeling agitated, angry, or irritable  · hallucination, loss of contact with reality  · loss of balance or coordination  · loss of memory  · painful or prolonged erections  · restlessness, pacing, inability to keep still  · seizures  · stiff muscles  · suicidal thoughts or other mood changes  · trouble sleeping  · unusual bleeding or bruising  · unusually weak or tired  · vomiting  Side effects that usually do not require medical attention (report to your doctor or health care professional if they  continue or are bothersome):  · changes in appetite  · change in sex drive or performance  · headache  · increased sweating  · indigestion, nausea  · tremors  This list may not describe all possible side effects. Call your doctor for medical advice about side effects. You may report side effects to FDA at 2-884-GPV-5582.  Where should I keep my medicine?  Keep out of reach of children.  Store at room temperature between 15 and 30 degrees C (59 and 86 degrees F). Throw away any unused medicine after the expiration date.  NOTE: This sheet is a summary. It may not cover all possible information. If you have questions about this medicine, talk to your doctor, pharmacist, or health care provider.  © 2020 [employer] (2019-12-09 11:21:44)

## 2020-12-21 NOTE — PROGRESS NOTES
"Subjective   Usman Burrell is a 40 y.o. male.     History of Present Illness   Usman Burrell male 40 y.o., /80 (BP Location: Right arm, Patient Position: Sitting, Cuff Size: Adult)   Pulse 88   Temp 97.6 °F (36.4 °C)   Resp 16   Ht 172.7 cm (67.99\")   Wt 99.8 kg (220 lb)   SpO2 100%   BMI 33.46 kg/m²   who presents today for follow up of Anxiety and Panic Attacks.  He reports much help with Lexapro and started dose low. Still will get panic attack and last time was 2 hours--try Vistaril first and may need Xanax. Onset of symptoms was approximately several months ago.  He denies current suicidal and homicidal ideation. Risk factors are family history of anxiety and or depression and lifestyle of multiple roles.  Previous treatment includes current Rx.  He complains of the following medication side effects: none.  The patient has previously been in counseling..        Did see GI and had EGD-----then sent to Allergist--had testing and ? Immunotherapy?   Also has anxiety and panic attacks.  Was on Buspar in past. No help  Was here last time for heart racing. Can have once a week for last few weeks. Maybe some less on Lexapro  101-110 heart rate--can last 15 minutes to 1 hour. Palpitations--fast heat rate  Can get chest pressure during this. No SOA. No skips or irregular.   Exertional SOA ---this is new----happened on Sat.  Dad had heart disease    Folic acid now at goal  The following portions of the patient's history were reviewed and updated as appropriate: allergies, current medications, past family history, past medical history, past social history, past surgical history and problem list.    Review of Systems   Constitutional: Negative for activity change, appetite change and unexpected weight change.   HENT: Negative for nosebleeds and trouble swallowing.    Eyes: Negative for pain and visual disturbance.   Respiratory: Negative for chest tightness, shortness of breath and wheezing.    Cardiovascular: " Negative for chest pain and palpitations.   Gastrointestinal: Negative for abdominal pain and blood in stool.   Endocrine: Negative.    Genitourinary: Negative for difficulty urinating and hematuria.   Musculoskeletal: Negative for joint swelling.   Skin: Negative for color change and rash.   Allergic/Immunologic: Negative.    Neurological: Negative for syncope and speech difficulty.   Hematological: Negative for adenopathy.   Psychiatric/Behavioral: Positive for sleep disturbance. Negative for agitation, confusion and dysphoric mood. The patient is nervous/anxious.    All other systems reviewed and are negative.      Objective   Physical Exam  Vitals signs and nursing note reviewed.   Constitutional:       General: He is not in acute distress.     Appearance: Normal appearance. He is well-developed. He is not diaphoretic.   HENT:      Head: Normocephalic.      Right Ear: External ear normal.      Left Ear: External ear normal.      Nose: Nose normal.   Eyes:      General: No scleral icterus.     Conjunctiva/sclera: Conjunctivae normal.      Pupils: Pupils are equal, round, and reactive to light.   Neck:      Musculoskeletal: Normal range of motion and neck supple.      Vascular: No carotid bruit.   Cardiovascular:      Rate and Rhythm: Normal rate and regular rhythm.      Heart sounds: Normal heart sounds. No murmur.   Pulmonary:      Effort: Pulmonary effort is normal.      Breath sounds: Normal breath sounds. No stridor.   Musculoskeletal: Normal range of motion.      Right lower leg: No edema.      Left lower leg: No edema.   Skin:     General: Skin is warm and dry.      Findings: No rash.   Neurological:      General: No focal deficit present.      Mental Status: He is alert and oriented to person, place, and time. Mental status is at baseline.   Psychiatric:         Mood and Affect: Mood normal. Affect is not inappropriate.         Behavior: Behavior normal.         Thought Content: Thought content normal.          Judgment: Judgment normal.         Assessment/Plan   Diagnoses and all orders for this visit:    1. Low folic acid (Primary)    2. Generalized anxiety disorder    3. Situational anxiety    4. Vitamin D deficiency    Other orders  -     Cholecalciferol 50 MCG (2000 UT) capsule; One PO daily  Dispense: 90 each; Refill: 3  -     hydrOXYzine (ATARAX) 25 MG tablet; Take 1 tablet by mouth 3 (Three) Times a Day As Needed for Anxiety.  Dispense: 30 tablet; Refill: 1  -     escitalopram (Lexapro) 10 MG tablet; Take 1 tablet by mouth Daily. New dose for anxiety  Dispense: 30 tablet; Refill: 1        Still see cardiologist   Add Atarax for panic attacks; may need to see me about Xanax if fails  Labs show low Vitamin D levels.  I want you to add OTC Vitamin D 2,000 I.U. Daily.  Cont Folic acid  Go up on Lexapro and helping anxiety  F/u few weeks     Answers for HPI/ROS submitted by the patient on 12/21/2020   What is the primary reason for your visit?: Other  Please describe your symptoms.: Medicine review  Have you had these symptoms before?: No  How long have you been having these symptoms?: Greater than 2 weeks  Please list any medications you are currently taking for this condition.: A  Please describe any probable cause for these symptoms. : A

## 2020-12-28 ENCOUNTER — HOSPITAL ENCOUNTER (OUTPATIENT)
Dept: CARDIOLOGY | Facility: HOSPITAL | Age: 40
Discharge: HOME | End: 2020-12-28
Admitting: PHYSICIAN ASSISTANT

## 2020-12-28 VITALS — HEIGHT: 67 IN | WEIGHT: 220 LBS | BODY MASS INDEX: 34.53 KG/M2

## 2020-12-28 DIAGNOSIS — K21.9 GASTROESOPHAGEAL REFLUX DISEASE WITHOUT ESOPHAGITIS: ICD-10-CM

## 2020-12-28 DIAGNOSIS — R00.2 PALPITATIONS: ICD-10-CM

## 2020-12-28 DIAGNOSIS — E53.8 LOW SERUM VITAMIN B12: ICD-10-CM

## 2020-12-28 DIAGNOSIS — E53.8 LOW FOLIC ACID: ICD-10-CM

## 2020-12-28 DIAGNOSIS — F41.1 GENERALIZED ANXIETY DISORDER: ICD-10-CM

## 2020-12-28 DIAGNOSIS — E55.9 VITAMIN D DEFICIENCY: ICD-10-CM

## 2020-12-28 LAB
AORTIC ARCH: 2.3 CM
ASCENDING AORTA: 3.2 CM
BH CV ECHO MEAS - ACS: 2.3 CM
BH CV ECHO MEAS - AO MAX PG (FULL): 3.7 MMHG
BH CV ECHO MEAS - AO MAX PG: 6.4 MMHG
BH CV ECHO MEAS - AO MEAN PG (FULL): 2 MMHG
BH CV ECHO MEAS - AO MEAN PG: 3.5 MMHG
BH CV ECHO MEAS - AO V2 MAX: 126.8 CM/SEC
BH CV ECHO MEAS - AO V2 MEAN: 84.5 CM/SEC
BH CV ECHO MEAS - AO V2 VTI: 26.5 CM
BH CV ECHO MEAS - ASC AORTA: 3.2 CM
BH CV ECHO MEAS - AVA(I,A): 2.4 CM^2
BH CV ECHO MEAS - AVA(I,D): 2.4 CM^2
BH CV ECHO MEAS - AVA(V,A): 2.6 CM^2
BH CV ECHO MEAS - AVA(V,D): 2.6 CM^2
BH CV ECHO MEAS - BSA(HAYCOCK): 2.2 M^2
BH CV ECHO MEAS - BSA: 2.1 M^2
BH CV ECHO MEAS - BZI_BMI: 33.5 KILOGRAMS/M^2
BH CV ECHO MEAS - BZI_METRIC_HEIGHT: 172.7 CM
BH CV ECHO MEAS - BZI_METRIC_WEIGHT: 99.8 KG
BH CV ECHO MEAS - EDV(MOD-SP2): 66 ML
BH CV ECHO MEAS - EDV(MOD-SP4): 58 ML
BH CV ECHO MEAS - EDV(TEICH): 128.3 ML
BH CV ECHO MEAS - EF(CUBED): 71.1 %
BH CV ECHO MEAS - EF(MOD-BP): 57 %
BH CV ECHO MEAS - EF(MOD-SP2): 57.6 %
BH CV ECHO MEAS - EF(MOD-SP4): 56.9 %
BH CV ECHO MEAS - EF(TEICH): 62.3 %
BH CV ECHO MEAS - ESV(MOD-SP2): 28 ML
BH CV ECHO MEAS - ESV(MOD-SP4): 25 ML
BH CV ECHO MEAS - ESV(TEICH): 48.3 ML
BH CV ECHO MEAS - FS: 33.8 %
BH CV ECHO MEAS - IVS/LVPW: 0.95
BH CV ECHO MEAS - IVSD: 0.95 CM
BH CV ECHO MEAS - LAT PEAK E' VEL: 9.5 CM/SEC
BH CV ECHO MEAS - LV DIASTOLIC VOL/BSA (35-75): 27.3 ML/M^2
BH CV ECHO MEAS - LV MASS(C)D: 187.4 GRAMS
BH CV ECHO MEAS - LV MASS(C)DI: 88 GRAMS/M^2
BH CV ECHO MEAS - LV MAX PG: 2.7 MMHG
BH CV ECHO MEAS - LV MEAN PG: 1.5 MMHG
BH CV ECHO MEAS - LV SYSTOLIC VOL/BSA (12-30): 11.7 ML/M^2
BH CV ECHO MEAS - LV V1 MAX: 82.7 CM/SEC
BH CV ECHO MEAS - LV V1 MEAN: 55.4 CM/SEC
BH CV ECHO MEAS - LV V1 VTI: 15.8 CM
BH CV ECHO MEAS - LVIDD: 5.2 CM
BH CV ECHO MEAS - LVIDS: 3.4 CM
BH CV ECHO MEAS - LVLD AP2: 7.4 CM
BH CV ECHO MEAS - LVLD AP4: 7.1 CM
BH CV ECHO MEAS - LVLS AP2: 6.2 CM
BH CV ECHO MEAS - LVLS AP4: 6.6 CM
BH CV ECHO MEAS - LVOT AREA (M): 4.2 CM^2
BH CV ECHO MEAS - LVOT AREA: 4 CM^2
BH CV ECHO MEAS - LVOT DIAM: 2.3 CM
BH CV ECHO MEAS - LVPWD: 1 CM
BH CV ECHO MEAS - MED PEAK E' VEL: 8.8 CM/SEC
BH CV ECHO MEAS - MV A DUR: 0.12 SEC
BH CV ECHO MEAS - MV A MAX VEL: 60 CM/SEC
BH CV ECHO MEAS - MV DEC SLOPE: 334.1 CM/SEC^2
BH CV ECHO MEAS - MV DEC TIME: 0.23 SEC
BH CV ECHO MEAS - MV E MAX VEL: 73.3 CM/SEC
BH CV ECHO MEAS - MV E/A: 1.2
BH CV ECHO MEAS - MV MAX PG: 2.9 MMHG
BH CV ECHO MEAS - MV MEAN PG: 1.7 MMHG
BH CV ECHO MEAS - MV P1/2T MAX VEL: 70.3 CM/SEC
BH CV ECHO MEAS - MV P1/2T: 61.6 MSEC
BH CV ECHO MEAS - MV V2 MAX: 84.7 CM/SEC
BH CV ECHO MEAS - MV V2 MEAN: 62.5 CM/SEC
BH CV ECHO MEAS - MV V2 VTI: 17.6 CM
BH CV ECHO MEAS - MVA P1/2T LCG: 3.1 CM^2
BH CV ECHO MEAS - MVA(P1/2T): 3.6 CM^2
BH CV ECHO MEAS - MVA(VTI): 3.6 CM^2
BH CV ECHO MEAS - PA MAX PG (FULL): 2.5 MMHG
BH CV ECHO MEAS - PA MAX PG: 4 MMHG
BH CV ECHO MEAS - PA V2 MAX: 100.4 CM/SEC
BH CV ECHO MEAS - PULM A REVS DUR: 0.14 SEC
BH CV ECHO MEAS - PULM A REVS VEL: 41.1 CM/SEC
BH CV ECHO MEAS - PULM DIAS VEL: 38.6 CM/SEC
BH CV ECHO MEAS - PULM S/D: 1.4
BH CV ECHO MEAS - PULM SYS VEL: 55.3 CM/SEC
BH CV ECHO MEAS - PVA(V,A): 2.3 CM^2
BH CV ECHO MEAS - PVA(V,D): 2.3 CM^2
BH CV ECHO MEAS - QP/QS: 0.6
BH CV ECHO MEAS - RAP SYSTOLE: 8 MMHG
BH CV ECHO MEAS - RV MAX PG: 1.5 MMHG
BH CV ECHO MEAS - RV MEAN PG: 0.92 MMHG
BH CV ECHO MEAS - RV V1 MAX: 62.1 CM/SEC
BH CV ECHO MEAS - RV V1 MEAN: 45.7 CM/SEC
BH CV ECHO MEAS - RV V1 VTI: 10.3 CM
BH CV ECHO MEAS - RVOT AREA: 3.7 CM^2
BH CV ECHO MEAS - RVOT DIAM: 2.2 CM
BH CV ECHO MEAS - RVSP: 31 MMHG
BH CV ECHO MEAS - SI(CUBED): 46.4 ML/M^2
BH CV ECHO MEAS - SI(LVOT): 30 ML/M^2
BH CV ECHO MEAS - SI(MOD-SP2): 17.9 ML/M^2
BH CV ECHO MEAS - SI(MOD-SP4): 15.5 ML/M^2
BH CV ECHO MEAS - SI(TEICH): 37.6 ML/M^2
BH CV ECHO MEAS - SUP REN AO DIAM: 1.9 CM
BH CV ECHO MEAS - SV(CUBED): 98.7 ML
BH CV ECHO MEAS - SV(LVOT): 63.9 ML
BH CV ECHO MEAS - SV(MOD-SP2): 38 ML
BH CV ECHO MEAS - SV(MOD-SP4): 33 ML
BH CV ECHO MEAS - SV(RVOT): 38.6 ML
BH CV ECHO MEAS - SV(TEICH): 80 ML
BH CV ECHO MEAS - TAPSE (>1.6): 2.2 CM
BH CV ECHO MEAS - TR MAX VEL: 241.4 CM/SEC
BH CV ECHO MEASUREMENTS AVERAGE E/E' RATIO: 8.01
BH CV XLRA - RV BASE: 3.6 CM
BH CV XLRA - RV LENGTH: 7.7 CM
BH CV XLRA - RV MID: 2.6 CM
BH CV XLRA - TDI S': 11.9 CM/SEC
LEFT ATRIUM VOLUME INDEX: 15 ML/M2
MAXIMAL PREDICTED HEART RATE: 178 BPM
SINUS: 3.4 CM
STJ: 3 CM
STRESS TARGET HR: 151 BPM

## 2020-12-28 PROCEDURE — 93306 TTE W/DOPPLER COMPLETE: CPT

## 2020-12-28 PROCEDURE — 93306 TTE W/DOPPLER COMPLETE: CPT | Performed by: INTERNAL MEDICINE

## 2021-01-11 ENCOUNTER — TELEMEDICINE (OUTPATIENT)
Dept: FAMILY MEDICINE CLINIC | Facility: CLINIC | Age: 41
End: 2021-01-11

## 2021-01-11 DIAGNOSIS — F41.1 GENERALIZED ANXIETY DISORDER: Primary | Chronic | ICD-10-CM

## 2021-01-11 DIAGNOSIS — E53.8 LOW FOLIC ACID: Chronic | ICD-10-CM

## 2021-01-11 DIAGNOSIS — E53.8 LOW SERUM VITAMIN B12: Chronic | ICD-10-CM

## 2021-01-11 PROCEDURE — 99213 OFFICE O/P EST LOW 20 MIN: CPT | Performed by: PHYSICIAN ASSISTANT

## 2021-01-11 RX ORDER — ESCITALOPRAM OXALATE 10 MG/1
10 TABLET ORAL DAILY
Qty: 90 TABLET | Refills: 1 | Status: SHIPPED | OUTPATIENT
Start: 2021-01-11 | End: 2021-04-19

## 2021-01-11 NOTE — PROGRESS NOTES
Subjective   Usman Burrell is a 40 y.o. male.     History of Present Illness   Usman Burrell male 40 y.o., There were no vitals taken for this visit.  who presents today for follow up of Anxiety and Panic Attacks.  He reports medication is working well at the higher dose of 10mg raised last visit, patient desires to continue on Rx, and needs refill. Onset of symptoms was approximately several months ago.  He denies current suicidal and homicidal ideation. Risk factors are family history of anxiety and or depression and lifestyle of multiple roles.  Previous treatment includes current Rx.  He complains of the following medication side effects: weight gain.  The patient has previously been in counseling.. Went down to occas palpitations and tachycardia---overall so much improved.   Did use Hydroxyzine once and did work.   My last notes from 12-21-20  Did see GI and had EGD-----then sent to Allergist--had testing and ? Immunotherapy?   Also has anxiety and panic attacks.  Was on Buspar in past. No help  Was here last time for heart racing. Can have once a week for last few weeks. Maybe some less on Lexapro  101-110 heart rate--can last 15 minutes to 1 hour. Palpitations--fast heat rate  Can get chest pressure during this. No SOA. No skips or irregular.   Exertional SOA ---this is new----happened on Sat.  Dad had heart disease.    He does have cardio appt.  Added Atarax for panic attacks. Raised dose Lexapro, cont folic acid.    Also note 24 Holter 11-30-20 benign study. Echo . Fine  Less globulus sensations also  The following portions of the patient's history were reviewed and updated as appropriate: allergies, current medications, past family history, past medical history, past social history, past surgical history and problem list.    Review of Systems   Cardiovascular: Negative for chest pain.   Psychiatric/Behavioral: Negative for dysphoric mood and sleep disturbance.       Objective   Physical  Exam  Constitutional:       General: He is not in acute distress.     Appearance: Normal appearance. He is well-developed. He is not ill-appearing, toxic-appearing or diaphoretic.   HENT:      Head: Normocephalic and atraumatic.      Right Ear: External ear normal.      Left Ear: External ear normal.   Eyes:      General: No scleral icterus.        Right eye: No discharge.         Left eye: No discharge.      Conjunctiva/sclera: Conjunctivae normal.   Neck:      Musculoskeletal: Normal range of motion.      Trachea: No tracheal deviation.   Pulmonary:      Effort: Pulmonary effort is normal. No respiratory distress.      Breath sounds: No stridor.   Musculoskeletal: Normal range of motion.         General: No deformity.   Skin:     Coloration: Skin is not jaundiced or pale.   Neurological:      General: No focal deficit present.      Mental Status: He is alert and oriented to person, place, and time. Mental status is at baseline.      Motor: No abnormal muscle tone.      Coordination: Coordination normal.   Psychiatric:         Mood and Affect: Mood normal.         Behavior: Behavior normal.         Thought Content: Thought content normal.         Judgment: Judgment normal.         Assessment/Plan   Diagnoses and all orders for this visit:    1. Generalized anxiety disorder (Primary)    2. Low serum vitamin B12    3. Low folic acid    Other orders  -     escitalopram (Lexapro) 10 MG tablet; Take 1 tablet by mouth Daily. for anxiety  Dispense: 90 tablet; Refill: 1      Has cardio appt  Cont Lexapro at 10mg---working  Keep Hydroxyzine PRN panic  On Folic acid and B12  Time spent 11 minutes

## 2021-01-11 NOTE — PATIENT INSTRUCTIONS
Generalized Anxiety Disorder, Adult  Generalized anxiety disorder (NOLAN) is a mental health disorder. People with this condition constantly worry about everyday events. Unlike normal anxiety, worry related to NOLAN is not triggered by a specific event. These worries also do not fade or get better with time. NOLAN interferes with life functions, including relationships, work, and school.  NOLAN can vary from mild to severe. People with severe NOLAN can have intense waves of anxiety with physical symptoms (panic attacks).  What are the causes?  The exact cause of NOLAN is not known.  What increases the risk?  This condition is more likely to develop in:  · Women.  · People who have a family history of anxiety disorders.  · People who are very shy.  · People who experience very stressful life events, such as the death of a loved one.  · People who have a very stressful family environment.  What are the signs or symptoms?  People with NOLAN often worry excessively about many things in their lives, such as their health and family. They may also be overly concerned about:  · Doing well at work.  · Being on time.  · Natural disasters.  · Friendships.  Physical symptoms of NOLAN include:  · Fatigue.  · Muscle tension or having muscle twitches.  · Trembling or feeling shaky.  · Being easily startled.  · Feeling like your heart is pounding or racing.  · Feeling out of breath or like you cannot take a deep breath.  · Having trouble falling asleep or staying asleep.  · Sweating.  · Nausea, diarrhea, or irritable bowel syndrome (IBS).  · Headaches.  · Trouble concentrating or remembering facts.  · Restlessness.  · Irritability.  How is this diagnosed?  Your health care provider can diagnose NOLAN based on your symptoms and medical history. You will also have a physical exam. The health care provider will ask specific questions about your symptoms, including how severe they are, when they started, and if they come and go. Your health care  provider may ask you about your use of alcohol or drugs, including prescription medicines. Your health care provider may refer you to a mental health specialist for further evaluation.  Your health care provider will do a thorough examination and may perform additional tests to rule out other possible causes of your symptoms.  To be diagnosed with NOLAN, a person must have anxiety that:  · Is out of his or her control.  · Affects several different aspects of his or her life, such as work and relationships.  · Causes distress that makes him or her unable to take part in normal activities.  · Includes at least three physical symptoms of NOLAN, such as restlessness, fatigue, trouble concentrating, irritability, muscle tension, or sleep problems.  Before your health care provider can confirm a diagnosis of NOLAN, these symptoms must be present more days than they are not, and they must last for six months or longer.  How is this treated?  The following therapies are usually used to treat NOLAN:  · Medicine. Antidepressant medicine is usually prescribed for long-term daily control. Antianxiety medicines may be added in severe cases, especially when panic attacks occur.  · Talk therapy (psychotherapy). Certain types of talk therapy can be helpful in treating NOLAN by providing support, education, and guidance. Options include:  ? Cognitive behavioral therapy (CBT). People learn coping skills and techniques to ease their anxiety. They learn to identify unrealistic or negative thoughts and behaviors and to replace them with positive ones.  ? Acceptance and commitment therapy (ACT). This treatment teaches people how to be mindful as a way to cope with unwanted thoughts and feelings.  ? Biofeedback. This process trains you to manage your body's response (physiological response) through breathing techniques and relaxation methods. You will work with a therapist while machines are used to monitor your physical symptoms.  · Stress  management techniques. These include yoga, meditation, and exercise.  A mental health specialist can help determine which treatment is best for you. Some people see improvement with one type of therapy. However, other people require a combination of therapies.  Follow these instructions at home:  · Take over-the-counter and prescription medicines only as told by your health care provider.  · Try to maintain a normal routine.  · Try to anticipate stressful situations and allow extra time to manage them.  · Practice any stress management or self-calming techniques as taught by your health care provider.  · Do not punish yourself for setbacks or for not making progress.  · Try to recognize your accomplishments, even if they are small.  · Keep all follow-up visits as told by your health care provider. This is important.  Contact a health care provider if:  · Your symptoms do not get better.  · Your symptoms get worse.  · You have signs of depression, such as:  ? A persistently sad, cranky, or irritable mood.  ? Loss of enjoyment in activities that used to bring you rylee.  ? Change in weight or eating.  ? Changes in sleeping habits.  ? Avoiding friends or family members.  ? Loss of energy for normal tasks.  ? Feelings of guilt or worthlessness.  Get help right away if:  · You have serious thoughts about hurting yourself or others.  If you ever feel like you may hurt yourself or others, or have thoughts about taking your own life, get help right away. You can go to your nearest emergency department or call:  · Your local emergency services (911 in the [place]  · A suicide crisis helpline, such as the National Suicide Prevention Lifeline at 1-412.144.9555. This is open 24 hours a day.  Summary  · Generalized anxiety disorder (NOLAN) is a mental health disorder that involves worry that is not triggered by a specific event.  · People with NOLAN often worry excessively about many things in their lives, such as their health and  family.  · ONLAN may cause physical symptoms such as restlessness, trouble concentrating, sleep problems, frequent sweating, nausea, diarrhea, headaches, and trembling or muscle twitching.  · A mental health specialist can help determine which treatment is best for you. Some people see improvement with one type of therapy. However, other people require a combination of therapies.  This information is not intended to replace advice given to you by your health care provider. Make sure you discuss any questions you have with your health care provider.  Document Revised: 11/30/2018 Document Reviewed: 11/07/2017  Elsevier Patient Education © 2020 [employer]

## 2021-01-21 ENCOUNTER — OFFICE VISIT (OUTPATIENT)
Dept: CARDIOLOGY | Facility: CLINIC | Age: 41
End: 2021-01-21

## 2021-01-21 VITALS
WEIGHT: 226 LBS | HEIGHT: 67 IN | SYSTOLIC BLOOD PRESSURE: 120 MMHG | OXYGEN SATURATION: 98 % | DIASTOLIC BLOOD PRESSURE: 80 MMHG | HEART RATE: 79 BPM | BODY MASS INDEX: 35.47 KG/M2

## 2021-01-21 DIAGNOSIS — R07.89 CHEST PAIN, ATYPICAL: Primary | ICD-10-CM

## 2021-01-21 PROCEDURE — 99203 OFFICE O/P NEW LOW 30 MIN: CPT | Performed by: INTERNAL MEDICINE

## 2021-01-21 PROCEDURE — 93000 ELECTROCARDIOGRAM COMPLETE: CPT | Performed by: INTERNAL MEDICINE

## 2021-01-21 NOTE — PROGRESS NOTES
Subjective:     Encounter Date:01/21/2021      Patient ID: Usman Burrell is a 40 y.o. male.    Chief Complaint:  Shortness of Breath  Associated symptoms include chest pain.   Chest Pain   Associated symptoms include shortness of breath.     40-year-old gentleman who presents today for evaluation.  Patient's father passed away September 28, 2020.  He really did not take this well start having episodes of both palpitations shortness of breath and chest discomfort.  Patient described as a dull pain he said one time it lasted up to 30 minutes.  He said his heart would race and abdominal dizzy from time to time.  He was placed on antianxiety medicine about 2 months ago and for the last 6 weeks he is actually done very well with no symptoms.    Review of Systems   Cardiovascular: Positive for chest pain.   Respiratory: Positive for shortness of breath.    Psychiatric/Behavioral: The patient is nervous/anxious.          ECG 12 Lead    Date/Time: 1/21/2021 1:28 PM  Performed by: Yonas Penn MD  Authorized by: Yonas Penn MD   Previous ECG: no previous ECG available  Rhythm: sinus rhythm    Clinical impression: normal ECG               Objective:     Vitals signs reviewed.   Constitutional:       Appearance: Well-developed.   Eyes:      Conjunctiva/sclera: Conjunctivae normal.   HENT:      Head: Normocephalic.   Neck:      Musculoskeletal: Normal range of motion.   Pulmonary:      Breath sounds: Normal breath sounds.   Cardiovascular:      Normal rate. Regular rhythm.   Edema:     Peripheral edema absent.   Musculoskeletal: Normal range of motion.   Skin:     General: Skin is warm and dry.   Neurological:      Mental Status: Alert and oriented to person, place, and time.   Psychiatric:         Behavior: Behavior normal.         Lab Review:       Assessment:          Diagnosis Plan   1. Chest pain, atypical            Plan:       1.  Chest discomfort/shortness of breath/palpitations.  Patient is doing  significantly better since he was treated with antianxiety medications.  His ECG is unremarkable.  He has not had any symptoms now in 6 weeks and says he feels good.  He has come to  with his father's passing and I believe this is the etiology for his issues.  Since is asymptomatic his EKG is unremarkable I just offered reassurance at this point.  I told her if he starts having more issues or any concerns to contact me and I would set him up for stress test as an outpatient.  Otherwise see him on an as-needed basis.

## 2021-04-02 ENCOUNTER — BULK ORDERING (OUTPATIENT)
Dept: CASE MANAGEMENT | Facility: OTHER | Age: 41
End: 2021-04-02

## 2021-04-02 DIAGNOSIS — Z23 IMMUNIZATION DUE: ICD-10-CM

## 2021-04-19 ENCOUNTER — OFFICE VISIT (OUTPATIENT)
Dept: FAMILY MEDICINE CLINIC | Facility: CLINIC | Age: 41
End: 2021-04-19

## 2021-04-19 VITALS
TEMPERATURE: 97.5 F | BODY MASS INDEX: 36.41 KG/M2 | WEIGHT: 232 LBS | DIASTOLIC BLOOD PRESSURE: 60 MMHG | SYSTOLIC BLOOD PRESSURE: 115 MMHG | HEART RATE: 82 BPM | OXYGEN SATURATION: 99 % | RESPIRATION RATE: 16 BRPM | HEIGHT: 67 IN

## 2021-04-19 DIAGNOSIS — S00.412A EAR CANAL ABRASION, LEFT, INITIAL ENCOUNTER: ICD-10-CM

## 2021-04-19 DIAGNOSIS — F41.1 GENERALIZED ANXIETY DISORDER: Primary | ICD-10-CM

## 2021-04-19 PROCEDURE — 99213 OFFICE O/P EST LOW 20 MIN: CPT | Performed by: PHYSICIAN ASSISTANT

## 2021-04-19 RX ORDER — COLISTIN SULFATE, NEOMYCIN SULFATE, THONZONIUM BROMIDE AND HYDROCORTISONE ACETATE 3; 3.3; .5; 1 MG/ML; MG/ML; MG/ML; MG/ML
3 SUSPENSION AURICULAR (OTIC) 4 TIMES DAILY
Qty: 10 ML | Refills: 1 | Status: SHIPPED | OUTPATIENT
Start: 2021-04-19 | End: 2022-10-04

## 2021-04-19 RX ORDER — HYDROXYZINE HYDROCHLORIDE 25 MG/1
25 TABLET, FILM COATED ORAL 3 TIMES DAILY PRN
Qty: 30 TABLET | Refills: 1 | Status: SHIPPED | OUTPATIENT
Start: 2021-04-19 | End: 2022-03-09 | Stop reason: SDUPTHER

## 2021-04-19 NOTE — PROGRESS NOTES
"Subjective   Usman Burrell is a 40 y.o. male.     History of Present Illness   Usman Burrell male 40 y.o., /60 (BP Location: Left arm, Patient Position: Sitting, Cuff Size: Adult)   Pulse 82   Temp 97.5 °F (36.4 °C)   Resp 16   Ht 170.2 cm (67.01\")   Wt 105 kg (232 lb)   SpO2 99%   BMI 36.33 kg/m²   who presents today for follow up of Anxiety.  He reports Lexapro has helped his anxiety but he is complaining of continued fatigue and weight gain.  This could be from the Lexapro and will have him taper off and try Zoloft.  He has been on BuSpar prior and no help.  Has not had a panic attack for a month and hydroxyzine is effective and will continue as needed.  Had labs in November did check thyroid.  Also talked about possibility of sleep apnea causing fatigue and patient to ask spouse if any observed apnea and will order sleep study if positive. . Onset of symptoms was approximately several years ago.  And worse since the death of his dad  He denies current suicidal and homicidal ideation. Risk factors are family history of anxiety and or depression, lifestyle of multiple roles and grief.  Previous treatment includes Lexapro 10 mg daily.  He complains of the following medication side effects: weight gain and Fatigue. The patient declines to go to counseling..   Weight gain and tired; will have him try Zoloft  He is still doing the keto diet and gaining weight  Left ear pain; blood in ear; sore-onset 2 days ago.  No purulent discharge just saw bright red blood when he stuck the Q-tip in his ear.  No pain  The following portions of the patient's history were reviewed and updated as appropriate: allergies, current medications, past family history, past medical history, past social history, past surgical history and problem list.    Review of Systems   Constitutional: Positive for fatigue and unexpected weight change. Negative for activity change and appetite change.   HENT: Negative for nosebleeds and trouble " swallowing.    Eyes: Negative for pain and visual disturbance.   Respiratory: Negative for chest tightness, shortness of breath and wheezing.    Cardiovascular: Negative for chest pain and palpitations.   Gastrointestinal: Negative for abdominal pain and blood in stool.   Endocrine: Negative.    Genitourinary: Negative for difficulty urinating and hematuria.   Musculoskeletal: Negative for joint swelling.   Skin: Negative for color change and rash.   Allergic/Immunologic: Negative.    Neurological: Negative for syncope and speech difficulty.   Hematological: Negative for adenopathy.   Psychiatric/Behavioral: Negative for agitation, confusion and dysphoric mood. The patient is nervous/anxious.    All other systems reviewed and are negative.      Objective   Physical Exam  Vitals and nursing note reviewed.   Constitutional:       General: He is not in acute distress.     Appearance: He is well-developed. He is not diaphoretic.   HENT:      Head: Normocephalic.      Comments: Left TM intact and do note abraded ear canal at 8:00 with dried blood mixed with bright red blood     Left Ear: Tympanic membrane and external ear normal.      Ears:      Comments: Left ear canal blood and abraded 7 o'clock--TM intact  Eyes:      General: No scleral icterus.     Conjunctiva/sclera: Conjunctivae normal.      Pupils: Pupils are equal, round, and reactive to light.   Cardiovascular:      Rate and Rhythm: Normal rate and regular rhythm.      Heart sounds: Normal heart sounds. No murmur heard.     Pulmonary:      Effort: Pulmonary effort is normal.      Breath sounds: Normal breath sounds. No rales.   Musculoskeletal:         General: Normal range of motion.      Cervical back: Normal range of motion and neck supple.   Skin:     General: Skin is warm and dry.      Findings: No rash.   Neurological:      Mental Status: He is alert and oriented to person, place, and time.   Psychiatric:         Mood and Affect: Mood normal. Affect is not  inappropriate.         Behavior: Behavior normal.         Thought Content: Thought content normal.         Judgment: Judgment normal.         Assessment/Plan   Diagnoses and all orders for this visit:    1. Generalized anxiety disorder (Primary)    2. Ear canal abrasion, left, initial encounter    Other orders  -     neomycin-colistin-hydrocortisone-thonzonium (Cortisporin-TC) 3.3-3-10-0.5 MG/ML otic suspension; Administer 3 drops into the left ear 4 (Four) Times a Day. For 5-7 days  Dispense: 10 mL; Refill: 1  -     hydrOXYzine (ATARAX) 25 MG tablet; Take 1 tablet by mouth 3 (Three) Times a Day As Needed for Anxiety.  Dispense: 30 tablet; Refill: 1  -     sertraline (Zoloft) 50 MG tablet; 1/2 tab PO daily for 6 days then one PO daily for stress  Dispense: 30 tablet; Refill: 1        Will have him try Zoloft    Ask spouse if sleep apnea  Taper lexapro and try Zoloft; consider SRNI if no help  Ear drops--avoid water in ear       Answers for HPI/ROS submitted by the patient on 4/19/2021  What is the primary reason for your visit?: Other  Please describe your symptoms.: Go over medicine and check ear  Have you had these symptoms before?: No  How long have you been having these symptoms?: 5-7 days

## 2021-04-19 NOTE — PATIENT INSTRUCTIONS
"[email or web address]   Generalized Anxiety Disorder, Adult  Generalized anxiety disorder (NOLAN) is a mental health condition. Unlike normal worries, anxiety related to NOLAN is not triggered by a specific event. These worries do not fade or get better with time. NOLAN interferes with relationships, work, and school.  NOLAN symptoms can vary from mild to severe. People with severe NOLAN can have intense waves of anxiety with physical symptoms that are similar to panic attacks.  What are the causes?  The exact cause of NOLAN is not known, but the following are believed to have an impact:  · Differences in natural brain chemicals.  · Genes passed down from parents to children.  · Differences in the way threats are perceived.  · Development during childhood.  · Personality.  What increases the risk?  The following factors may make you more likely to develop this condition:  · Being female.  · Having a family history of anxiety disorders.  · Being very shy.  · Experiencing very stressful life events, such as the death of a loved one.  · Having a very stressful family environment.  What are the signs or symptoms?  People with NOLAN often worry excessively about many things in their lives, such as their health and family. Symptoms may also include:  · Mental and emotional symptoms:  ? Worrying excessively about natural disasters.  ? Fear of being late.  ? Difficulty concentrating.  ? Fears that others are judging your performance.  · Physical symptoms:  ? Fatigue.  ? Headaches, muscle tension, muscle twitches, trembling, or feeling shaky.  ? Feeling like your heart is pounding or beating very fast.  ? Feeling out of breath or like you cannot take a deep breath.  ? Having trouble falling asleep or staying asleep, or experiencing restlessness.  ? Sweating.  ? Nausea, diarrhea, or irritable bowel syndrome (IBS).  · Behavioral symptoms:  ? Experiencing erratic moods or irritability.  ? Avoidance of new situations.  ? Avoidance of " people.  ? Extreme difficulty making decisions.  How is this diagnosed?  This condition is diagnosed based on your symptoms and medical history. You will also have a physical exam. Your health care provider may perform tests to rule out other possible causes of your symptoms.  To be diagnosed with NOLAN, a person must have anxiety that:  · Is out of his or her control.  · Affects several different aspects of his or her life, such as work and relationships.  · Causes distress that makes him or her unable to take part in normal activities.  · Includes at least three symptoms of NOLAN, such as restlessness, fatigue, trouble concentrating, irritability, muscle tension, or sleep problems.  Before your health care provider can confirm a diagnosis of NOLAN, these symptoms must be present more days than they are not, and they must last for 6 months or longer.  How is this treated?  This condition may be treated with:  · Medicine. Antidepressant medicine is usually prescribed for long-term daily control. Anti-anxiety medicines may be added in severe cases, especially when panic attacks occur.  · Talk therapy (psychotherapy). Certain types of talk therapy can be helpful in treating NOLAN by providing support, education, and guidance. Options include:  ? Cognitive behavioral therapy (CBT). People learn coping skills and self-calming techniques to ease their physical symptoms. They learn to identify unrealistic thoughts and behaviors and to replace them with more appropriate thoughts and behaviors.  ? Acceptance and commitment therapy (ACT). This treatment teaches people how to be mindful as a way to cope with unwanted thoughts and feelings.  ? Biofeedback. This process trains you to manage your body's response (physiological response) through breathing techniques and relaxation methods. You will work with a therapist while machines are used to monitor your physical symptoms.  · Stress management techniques. These include yoga,  meditation, and exercise.  A mental health specialist can help determine which treatment is best for you. Some people see improvement with one type of therapy. However, other people require a combination of therapies.  Follow these instructions at home:  Lifestyle  · Maintain a consistent routine and schedule.  · Anticipate stressful situations. Create a plan, and allow extra time to work with your plan.  · Practice stress management or self-calming techniques that you have learned from your therapist or your health care provider.  General instructions  · Take over-the-counter and prescription medicines only as told by your health care provider.  · Understand that you are likely to have setbacks. Accept this and be kind to yourself as you persist to take better care of yourself.  · Recognize and accept your accomplishments, even if you  them as small.  · Keep all follow-up visits as told by your health care provider. This is important.  Contact a health care provider if:  · Your symptoms do not get better.  · Your symptoms get worse.  · You have signs of depression, such as:  ? A persistently sad or irritable mood.  ? Loss of enjoyment in activities that used to bring you rylee.  ? Change in weight or eating.  ? Changes in sleeping habits.  ? Avoiding friends or family members.  ? Loss of energy for normal tasks.  ? Feelings of guilt or worthlessness.  Get help right away if:  · You have serious thoughts about hurting yourself or others.  If you ever feel like you may hurt yourself or others, or have thoughts about taking your own life, get help right away. Go to your nearest emergency department or:  · Call your local emergency services (271 in the [place]  · Call a suicide crisis helpline, such as the National Suicide Prevention Lifeline at 1-283.671.2327. This is open 24 hours a day in the [place]  · Text the Crisis Text Line at 787637 (in the [place]  Summary  · Generalized anxiety disorder (NOLAN) is a mental  health condition that involves worry that is not triggered by a specific event.  · People with NOLAN often worry excessively about many things in their lives, such as their health and family.  · NOLAN may cause symptoms such as restlessness, trouble concentrating, sleep problems, frequent sweating, nausea, diarrhea, headaches, and trembling or muscle twitching.  · A mental health specialist can help determine which treatment is best for you. Some people see improvement with one type of therapy. However, other people require a combination of therapies.  This information is not intended to replace advice given to you by your health care provider. Make sure you discuss any questions you have with your health care provider.  Document Revised: 10/07/2020 Document Reviewed: 10/07/2020  Elsevier Patient Education © 2021 [employer] Inc.

## 2021-05-19 ENCOUNTER — OFFICE VISIT (OUTPATIENT)
Dept: FAMILY MEDICINE CLINIC | Facility: CLINIC | Age: 41
End: 2021-05-19

## 2021-05-19 VITALS
RESPIRATION RATE: 16 BRPM | TEMPERATURE: 97.5 F | HEART RATE: 85 BPM | BODY MASS INDEX: 36.41 KG/M2 | OXYGEN SATURATION: 99 % | SYSTOLIC BLOOD PRESSURE: 112 MMHG | DIASTOLIC BLOOD PRESSURE: 76 MMHG | WEIGHT: 232 LBS | HEIGHT: 67 IN

## 2021-05-19 DIAGNOSIS — F41.1 GENERALIZED ANXIETY DISORDER: Primary | ICD-10-CM

## 2021-05-19 PROCEDURE — 99213 OFFICE O/P EST LOW 20 MIN: CPT | Performed by: PHYSICIAN ASSISTANT

## 2021-05-19 NOTE — PATIENT INSTRUCTIONS
"[email or web address]   Generalized Anxiety Disorder, Adult  Generalized anxiety disorder (NOLAN) is a mental health condition. Unlike normal worries, anxiety related to NOLAN is not triggered by a specific event. These worries do not fade or get better with time. NOLAN interferes with relationships, work, and school.  NOLAN symptoms can vary from mild to severe. People with severe NOLAN can have intense waves of anxiety with physical symptoms that are similar to panic attacks.  What are the causes?  The exact cause of NOLAN is not known, but the following are believed to have an impact:  · Differences in natural brain chemicals.  · Genes passed down from parents to children.  · Differences in the way threats are perceived.  · Development during childhood.  · Personality.  What increases the risk?  The following factors may make you more likely to develop this condition:  · Being female.  · Having a family history of anxiety disorders.  · Being very shy.  · Experiencing very stressful life events, such as the death of a loved one.  · Having a very stressful family environment.  What are the signs or symptoms?  People with NOLAN often worry excessively about many things in their lives, such as their health and family. Symptoms may also include:  · Mental and emotional symptoms:  ? Worrying excessively about natural disasters.  ? Fear of being late.  ? Difficulty concentrating.  ? Fears that others are judging your performance.  · Physical symptoms:  ? Fatigue.  ? Headaches, muscle tension, muscle twitches, trembling, or feeling shaky.  ? Feeling like your heart is pounding or beating very fast.  ? Feeling out of breath or like you cannot take a deep breath.  ? Having trouble falling asleep or staying asleep, or experiencing restlessness.  ? Sweating.  ? Nausea, diarrhea, or irritable bowel syndrome (IBS).  · Behavioral symptoms:  ? Experiencing erratic moods or irritability.  ? Avoidance of new situations.  ? Avoidance of " people.  ? Extreme difficulty making decisions.  How is this diagnosed?  This condition is diagnosed based on your symptoms and medical history. You will also have a physical exam. Your health care provider may perform tests to rule out other possible causes of your symptoms.  To be diagnosed with NOLAN, a person must have anxiety that:  · Is out of his or her control.  · Affects several different aspects of his or her life, such as work and relationships.  · Causes distress that makes him or her unable to take part in normal activities.  · Includes at least three symptoms of NOLAN, such as restlessness, fatigue, trouble concentrating, irritability, muscle tension, or sleep problems.  Before your health care provider can confirm a diagnosis of NOLAN, these symptoms must be present more days than they are not, and they must last for 6 months or longer.  How is this treated?  This condition may be treated with:  · Medicine. Antidepressant medicine is usually prescribed for long-term daily control. Anti-anxiety medicines may be added in severe cases, especially when panic attacks occur.  · Talk therapy (psychotherapy). Certain types of talk therapy can be helpful in treating NOLAN by providing support, education, and guidance. Options include:  ? Cognitive behavioral therapy (CBT). People learn coping skills and self-calming techniques to ease their physical symptoms. They learn to identify unrealistic thoughts and behaviors and to replace them with more appropriate thoughts and behaviors.  ? Acceptance and commitment therapy (ACT). This treatment teaches people how to be mindful as a way to cope with unwanted thoughts and feelings.  ? Biofeedback. This process trains you to manage your body's response (physiological response) through breathing techniques and relaxation methods. You will work with a therapist while machines are used to monitor your physical symptoms.  · Stress management techniques. These include yoga,  meditation, and exercise.  A mental health specialist can help determine which treatment is best for you. Some people see improvement with one type of therapy. However, other people require a combination of therapies.  Follow these instructions at home:  Lifestyle  · Maintain a consistent routine and schedule.  · Anticipate stressful situations. Create a plan, and allow extra time to work with your plan.  · Practice stress management or self-calming techniques that you have learned from your therapist or your health care provider.  General instructions  · Take over-the-counter and prescription medicines only as told by your health care provider.  · Understand that you are likely to have setbacks. Accept this and be kind to yourself as you persist to take better care of yourself.  · Recognize and accept your accomplishments, even if you  them as small.  · Keep all follow-up visits as told by your health care provider. This is important.  Contact a health care provider if:  · Your symptoms do not get better.  · Your symptoms get worse.  · You have signs of depression, such as:  ? A persistently sad or irritable mood.  ? Loss of enjoyment in activities that used to bring you rylee.  ? Change in weight or eating.  ? Changes in sleeping habits.  ? Avoiding friends or family members.  ? Loss of energy for normal tasks.  ? Feelings of guilt or worthlessness.  Get help right away if:  · You have serious thoughts about hurting yourself or others.  If you ever feel like you may hurt yourself or others, or have thoughts about taking your own life, get help right away. Go to your nearest emergency department or:  · Call your local emergency services (641 in the [place]  · Call a suicide crisis helpline, such as the National Suicide Prevention Lifeline at 1-624.968.6026. This is open 24 hours a day in the [place]  · Text the Crisis Text Line at 958127 (in the [place]  Summary  · Generalized anxiety disorder (NOLAN) is a mental  health condition that involves worry that is not triggered by a specific event.  · People with NOLAN often worry excessively about many things in their lives, such as their health and family.  · NOLAN may cause symptoms such as restlessness, trouble concentrating, sleep problems, frequent sweating, nausea, diarrhea, headaches, and trembling or muscle twitching.  · A mental health specialist can help determine which treatment is best for you. Some people see improvement with one type of therapy. However, other people require a combination of therapies.  This information is not intended to replace advice given to you by your health care provider. Make sure you discuss any questions you have with your health care provider.  Document Revised: 10/07/2020 Document Reviewed: 10/07/2020  Elsevier Patient Education © 2021 Elsevier Inc.

## 2021-05-19 NOTE — PROGRESS NOTES
"Subjective   Usman Burrell is a 41 y.o. male.     History of Present Illness       Usman Burrell male 41 y.o., /76 (BP Location: Right arm, Patient Position: Sitting, Cuff Size: Adult)   Pulse 85   Temp 97.5 °F (36.4 °C)   Resp 16   Ht 170.2 cm (67.01\")   Wt 105 kg (232 lb)   SpO2 99%   BMI 36.33 kg/m²   who presents today for follow up of Anxiety and Panic Attacks.  He reports change to Zoloft is helping anxiety--dose ok also; not depressed; some grief still over Dad.. Onset of symptoms was approximately several months ago.  He denies current suicidal and homicidal ideation. Risk factors are family history of anxiety and or depression and lifestyle of multiple roles.  Previous treatment includes Lexapro.  He complains of the following medication side effects: weight gain. The patient has previously been in counseling..  Has Hydroxyzine PRN panic and works    Changed Lexapro to Zoloft last mo--? Causing weight gain and tired?  Weight same----keto; need him to exercise  The following portions of the patient's history were reviewed and updated as appropriate: allergies, current medications, past family history, past medical history, past social history, past surgical history and problem list.    Review of Systems   Constitutional: Negative for activity change, appetite change and unexpected weight change.   HENT: Negative for nosebleeds and trouble swallowing.    Eyes: Negative for pain and visual disturbance.   Respiratory: Negative for chest tightness, shortness of breath and wheezing.    Cardiovascular: Negative for chest pain and palpitations.   Gastrointestinal: Negative for abdominal pain and blood in stool.   Endocrine: Negative.    Genitourinary: Negative for difficulty urinating and hematuria.   Musculoskeletal: Negative for joint swelling.   Skin: Negative for color change and rash.   Allergic/Immunologic: Negative.    Neurological: Negative for syncope and speech difficulty.   Hematological: " Negative for adenopathy.   Psychiatric/Behavioral: Negative for agitation and confusion.   All other systems reviewed and are negative.      Objective   Physical Exam  Vitals and nursing note reviewed.   Constitutional:       General: He is not in acute distress.     Appearance: He is well-developed. He is obese. He is not diaphoretic.   HENT:      Head: Normocephalic.   Eyes:      General: No scleral icterus.     Conjunctiva/sclera: Conjunctivae normal.      Pupils: Pupils are equal, round, and reactive to light.   Neck:      Vascular: No carotid bruit.   Cardiovascular:      Rate and Rhythm: Normal rate and regular rhythm.      Heart sounds: Normal heart sounds. No murmur heard.     Pulmonary:      Effort: Pulmonary effort is normal.      Breath sounds: Normal breath sounds. No rales.   Musculoskeletal:         General: Normal range of motion.      Cervical back: Normal range of motion and neck supple.   Skin:     General: Skin is warm and dry.      Findings: No rash.   Neurological:      Mental Status: He is alert and oriented to person, place, and time. Mental status is at baseline.   Psychiatric:         Mood and Affect: Mood normal. Affect is not inappropriate.         Behavior: Behavior normal.         Thought Content: Thought content normal.         Judgment: Judgment normal.         Assessment/Plan   Diagnoses and all orders for this visit:    1. Generalized anxiety disorder (Primary)    Other orders  -     sertraline (Zoloft) 50 MG tablet; one PO daily for stress  Dispense: 90 tablet; Refill: 1      Need to exercise  Keep Zoloft same and helping anxiety  Let me know if need to go up on dose  Watching weight

## 2022-03-09 ENCOUNTER — TELEMEDICINE (OUTPATIENT)
Dept: FAMILY MEDICINE CLINIC | Facility: CLINIC | Age: 42
End: 2022-03-09

## 2022-03-09 DIAGNOSIS — K21.9 GASTROESOPHAGEAL REFLUX DISEASE WITHOUT ESOPHAGITIS: ICD-10-CM

## 2022-03-09 DIAGNOSIS — R53.83 TIRED: ICD-10-CM

## 2022-03-09 DIAGNOSIS — E53.8 LOW FOLIC ACID: ICD-10-CM

## 2022-03-09 DIAGNOSIS — E53.8 LOW SERUM VITAMIN B12: ICD-10-CM

## 2022-03-09 DIAGNOSIS — F41.1 GENERALIZED ANXIETY DISORDER: Primary | ICD-10-CM

## 2022-03-09 DIAGNOSIS — E55.9 VITAMIN D DEFICIENCY: ICD-10-CM

## 2022-03-09 DIAGNOSIS — R79.89 LFT ELEVATION: ICD-10-CM

## 2022-03-09 PROCEDURE — 99213 OFFICE O/P EST LOW 20 MIN: CPT | Performed by: PHYSICIAN ASSISTANT

## 2022-03-09 RX ORDER — ESOMEPRAZOLE MAGNESIUM 40 MG/1
40 CAPSULE, DELAYED RELEASE ORAL DAILY
Qty: 90 CAPSULE | Refills: 3 | Status: SHIPPED | OUTPATIENT
Start: 2022-03-09 | End: 2022-11-10 | Stop reason: SDUPTHER

## 2022-03-09 RX ORDER — HYDROXYZINE HYDROCHLORIDE 25 MG/1
25 TABLET, FILM COATED ORAL 3 TIMES DAILY PRN
Qty: 30 TABLET | Refills: 1 | Status: SHIPPED | OUTPATIENT
Start: 2022-03-09 | End: 2022-12-12

## 2022-03-09 NOTE — PATIENT INSTRUCTIONS
"[email or web address]   Generalized Anxiety Disorder, Adult  Generalized anxiety disorder (NOLAN) is a mental health condition. Unlike normal worries, anxiety related to NOLAN is not triggered by a specific event. These worries do not fade or get better with time. NOLAN interferes with relationships, work, and school.  NOLAN symptoms can vary from mild to severe. People with severe ONLAN can have intense waves of anxiety with physical symptoms that are similar to panic attacks.  What are the causes?  The exact cause of NOLAN is not known, but the following are believed to have an impact:  Differences in natural brain chemicals.  Genes passed down from parents to children.  Differences in the way threats are perceived.  Development during childhood.  Personality.  What increases the risk?  The following factors may make you more likely to develop this condition:  Being female.  Having a family history of anxiety disorders.  Being very shy.  Experiencing very stressful life events, such as the death of a loved one.  Having a very stressful family environment.  What are the signs or symptoms?  People with NOLAN often worry excessively about many things in their lives, such as their health and family. Symptoms may also include:  Mental and emotional symptoms:  Worrying excessively about natural disasters.  Fear of being late.  Difficulty concentrating.  Fears that others are judging your performance.  Physical symptoms:  Fatigue.  Headaches, muscle tension, muscle twitches, trembling, or feeling shaky.  Feeling like your heart is pounding or beating very fast.  Feeling out of breath or like you cannot take a deep breath.  Having trouble falling asleep or staying asleep, or experiencing restlessness.  Sweating.  Nausea, diarrhea, or irritable bowel syndrome (IBS).  Behavioral symptoms:  Experiencing erratic moods or irritability.  Avoidance of new situations.  Avoidance of people.  Extreme difficulty making decisions.  How is this " diagnosed?  This condition is diagnosed based on your symptoms and medical history. You will also have a physical exam. Your health care provider may perform tests to rule out other possible causes of your symptoms.  To be diagnosed with NOLAN, a person must have anxiety that:  Is out of his or her control.  Affects several different aspects of his or her life, such as work and relationships.  Causes distress that makes him or her unable to take part in normal activities.  Includes at least three symptoms of NOLAN, such as restlessness, fatigue, trouble concentrating, irritability, muscle tension, or sleep problems.  Before your health care provider can confirm a diagnosis of NOLAN, these symptoms must be present more days than they are not, and they must last for 6 months or longer.  How is this treated?  This condition may be treated with:  Medicine. Antidepressant medicine is usually prescribed for long-term daily control. Anti-anxiety medicines may be added in severe cases, especially when panic attacks occur.  Talk therapy (psychotherapy). Certain types of talk therapy can be helpful in treating NOLAN by providing support, education, and guidance. Options include:  Cognitive behavioral therapy (CBT). People learn coping skills and self-calming techniques to ease their physical symptoms. They learn to identify unrealistic thoughts and behaviors and to replace them with more appropriate thoughts and behaviors.  Acceptance and commitment therapy (ACT). This treatment teaches people how to be mindful as a way to cope with unwanted thoughts and feelings.  Biofeedback. This process trains you to manage your body's response (physiological response) through breathing techniques and relaxation methods. You will work with a therapist while machines are used to monitor your physical symptoms.  Stress management techniques. These include yoga, meditation, and exercise.  A mental health specialist can help determine which treatment  is best for you. Some people see improvement with one type of therapy. However, other people require a combination of therapies.  Follow these instructions at home:  Lifestyle  Maintain a consistent routine and schedule.  Anticipate stressful situations. Create a plan, and allow extra time to work with your plan.  Practice stress management or self-calming techniques that you have learned from your therapist or your health care provider.  General instructions  Take over-the-counter and prescription medicines only as told by your health care provider.  Understand that you are likely to have setbacks. Accept this and be kind to yourself as you persist to take better care of yourself.  Recognize and accept your accomplishments, even if you  them as small.  Keep all follow-up visits as told by your health care provider. This is important.  Contact a health care provider if:  Your symptoms do not get better.  Your symptoms get worse.  You have signs of depression, such as:  A persistently sad or irritable mood.  Loss of enjoyment in activities that used to bring you rylee.  Change in weight or eating.  Changes in sleeping habits.  Avoiding friends or family members.  Loss of energy for normal tasks.  Feelings of guilt or worthlessness.  Get help right away if:  You have serious thoughts about hurting yourself or others.  If you ever feel like you may hurt yourself or others, or have thoughts about taking your own life, get help right away. Go to your nearest emergency department or:  Call your local emergency services (504 in the [place]  Call a suicide crisis helpline, such as the National Suicide Prevention Lifeline at 1-106.792.2684. This is open 24 hours a day in the [place]  Text the Crisis Text Line at 958216 (in the [place]  Summary  Generalized anxiety disorder (NOLAN) is a mental health condition that involves worry that is not triggered by a specific event.  People with NOLAN often worry excessively about many things  in their lives, such as their health and family.  NOLAN may cause symptoms such as restlessness, trouble concentrating, sleep problems, frequent sweating, nausea, diarrhea, headaches, and trembling or muscle twitching.  A mental health specialist can help determine which treatment is best for you. Some people see improvement with one type of therapy. However, other people require a combination of therapies.  This information is not intended to replace advice given to you by your health care provider. Make sure you discuss any questions you have with your health care provider.  Document Revised: 10/07/2020 Document Reviewed: 10/07/2020  Elsevier Patient Education © 2021 [employer] Inc.

## 2022-03-09 NOTE — PROGRESS NOTES
Subjective   Usman Burrell is a 41 y.o. male.   You have chosen to receive care through a telehealth visit.  Do you consent to use a video/audio connection for your medical care today? Yes  BMI Readings from Last 1 Encounters:   05/19/21 36.33 kg/m²     Usman Burrell male 41 y.o., There were no vitals taken for this visit.  who presents today for follow up of Anxiety.  He reports weight gain and medication is working well, patient desires to continue on Rx, and needs refill. Onset of symptoms was approximately several years ago. He denies current suicidal and homicidal ideation. Risk factors are family history of anxiety and or depression, lifestyle of multiple roles, grief and caring for elderly parent.  Previous treatment includes current Rx. He complains of the following medication side effects:weight gain. The patient declines to go to counseling..    Tired and weight up.   Prior notes 5-19-21    Has Hydroxyzine PRN panic and works  Changed Lexapro to Zoloft last mo--? Causing weight gain and tired?      Had labs 11-30-20-----I noted   Continue B12 and folic acid levels are much improved and at goal.  Labs show low Vitamin D levels.  I want you to add OTC Vitamin D 2,000 I.U. Daily.  Other labs are in range.     Since the last visit, he has overall felt tired.  He has GERD controlled on PPI Rx and Vitamin D deficiency and will update labs for continued management.  he has been compliant with current medications have reviewed them.  The patient denies medication side effects.  Will refill medications. There were no vitals taken for this visit.        History of Present Illness     The following portions of the patient's history were reviewed and updated as appropriate: allergies, current medications, past family history, past medical history, past social history, past surgical history and problem list.    Review of Systems   Constitutional: Positive for fatigue and unexpected weight change. Negative for activity  change and appetite change.   HENT: Negative for nosebleeds and trouble swallowing.    Eyes: Negative for pain and visual disturbance.   Respiratory: Negative for chest tightness, shortness of breath and wheezing.    Cardiovascular: Negative for chest pain and palpitations.   Gastrointestinal: Negative for abdominal pain and blood in stool.   Endocrine: Negative.    Genitourinary: Negative for difficulty urinating and hematuria.   Musculoskeletal: Negative for joint swelling.   Skin: Negative for color change and rash.   Allergic/Immunologic: Negative.    Neurological: Negative for syncope and speech difficulty.   Hematological: Negative for adenopathy.   Psychiatric/Behavioral: Negative for agitation and confusion.   All other systems reviewed and are negative.      Objective   Physical Exam  Constitutional:       General: He is not in acute distress.     Appearance: He is well-developed. He is not diaphoretic.   HENT:      Head: Normocephalic and atraumatic.      Right Ear: External ear normal.      Left Ear: External ear normal.   Eyes:      General: No scleral icterus.        Right eye: No discharge.         Left eye: No discharge.      Conjunctiva/sclera: Conjunctivae normal.   Neck:      Trachea: No tracheal deviation.   Pulmonary:      Effort: Pulmonary effort is normal. No respiratory distress.      Breath sounds: No stridor.   Abdominal:      Palpations: Abdomen is soft.      Tenderness: There is no guarding.   Musculoskeletal:         General: No deformity. Normal range of motion.      Cervical back: Normal range of motion.   Skin:     Coloration: Skin is not pale.   Neurological:      Mental Status: He is alert and oriented to person, place, and time.      Motor: No abnormal muscle tone.      Coordination: Coordination normal.   Psychiatric:         Mood and Affect: Mood normal.         Behavior: Behavior normal.         Thought Content: Thought content normal.         Judgment: Judgment normal.          Assessment/Plan   Diagnoses and all orders for this visit:    1. Generalized anxiety disorder (Primary)  -     Comprehensive metabolic panel  -     Lipid panel  -     CBC and Differential  -     TSH  -     T3, Free  -     T4, Free  -     Vitamin B12  -     Folate  -     Vitamin D 25 Hydroxy  -     Urinalysis With Microscopic - Urine, Clean Catch  -     Magnesium    2. Low folic acid  -     Comprehensive metabolic panel  -     Lipid panel  -     CBC and Differential  -     TSH  -     T3, Free  -     T4, Free  -     Vitamin B12  -     Folate  -     Vitamin D 25 Hydroxy  -     Urinalysis With Microscopic - Urine, Clean Catch  -     Magnesium    3. Low serum vitamin B12  -     Comprehensive metabolic panel  -     Lipid panel  -     CBC and Differential  -     TSH  -     T3, Free  -     T4, Free  -     Vitamin B12  -     Folate  -     Vitamin D 25 Hydroxy  -     Urinalysis With Microscopic - Urine, Clean Catch  -     Magnesium    4. Vitamin D deficiency  -     Comprehensive metabolic panel  -     Lipid panel  -     CBC and Differential  -     TSH  -     T3, Free  -     T4, Free  -     Vitamin B12  -     Folate  -     Vitamin D 25 Hydroxy  -     Urinalysis With Microscopic - Urine, Clean Catch  -     Magnesium    5. Tired  -     Comprehensive metabolic panel  -     Lipid panel  -     CBC and Differential  -     TSH  -     T3, Free  -     T4, Free  -     Vitamin B12  -     Folate  -     Vitamin D 25 Hydroxy  -     Urinalysis With Microscopic - Urine, Clean Catch  -     Magnesium    6. Gastroesophageal reflux disease without esophagitis  -     Comprehensive metabolic panel  -     Lipid panel  -     CBC and Differential  -     TSH  -     T3, Free  -     T4, Free  -     Vitamin B12  -     Folate  -     Vitamin D 25 Hydroxy  -     Urinalysis With Microscopic - Urine, Clean Catch  -     Magnesium    Other orders  -     esomeprazole (nexIUM) 40 MG capsule; Take 1 capsule by mouth Daily. for GERD  Dispense: 90 capsule;  Refill: 3  -     sertraline (ZOLOFT) 50 MG tablet; TAKE 1 TABLET BY MOUTH ONCE DAILY FOR  STRESS  Dispense: 90 tablet; Refill: 3      Labs for tired----if all in range---refer sleep apnea  Add exercise---wants weight down  Labs  Cont Zoloft --works for anxiety and PRN Hydroxyzine if need works  Time 14 min

## 2022-03-22 LAB
25(OH)D3+25(OH)D2 SERPL-MCNC: 37.7 NG/ML (ref 30–100)
ALBUMIN SERPL-MCNC: 4.7 G/DL (ref 4–5)
ALBUMIN/GLOB SERPL: 2 {RATIO} (ref 1.2–2.2)
ALP SERPL-CCNC: 71 IU/L (ref 44–121)
ALT SERPL-CCNC: 47 IU/L (ref 0–44)
APPEARANCE UR: CLEAR
AST SERPL-CCNC: 24 IU/L (ref 0–40)
BACTERIA #/AREA URNS HPF: NORMAL /[HPF]
BASOPHILS # BLD AUTO: 0.1 X10E3/UL (ref 0–0.2)
BASOPHILS NFR BLD AUTO: 2 %
BILIRUB SERPL-MCNC: 0.6 MG/DL (ref 0–1.2)
BILIRUB UR QL STRIP: NEGATIVE
BUN SERPL-MCNC: 18 MG/DL (ref 6–24)
BUN/CREAT SERPL: 16 (ref 9–20)
CALCIUM SERPL-MCNC: 9.5 MG/DL (ref 8.7–10.2)
CASTS URNS QL MICRO: NORMAL /LPF
CHLORIDE SERPL-SCNC: 103 MMOL/L (ref 96–106)
CHOLEST SERPL-MCNC: 167 MG/DL (ref 100–199)
CO2 SERPL-SCNC: 24 MMOL/L (ref 20–29)
COLOR UR: YELLOW
CREAT SERPL-MCNC: 1.11 MG/DL (ref 0.76–1.27)
EGFRCR SERPLBLD CKD-EPI 2021: 84 ML/MIN/1.73
EOSINOPHIL # BLD AUTO: 0.1 X10E3/UL (ref 0–0.4)
EOSINOPHIL NFR BLD AUTO: 3 %
EPI CELLS #/AREA URNS HPF: NORMAL /HPF (ref 0–10)
ERYTHROCYTE [DISTWIDTH] IN BLOOD BY AUTOMATED COUNT: 12.8 % (ref 11.6–15.4)
FOLATE SERPL-MCNC: 9.2 NG/ML
GLOBULIN SER CALC-MCNC: 2.4 G/DL (ref 1.5–4.5)
GLUCOSE SERPL-MCNC: 84 MG/DL (ref 65–99)
GLUCOSE UR QL STRIP: NEGATIVE
HCT VFR BLD AUTO: 49.2 % (ref 37.5–51)
HDLC SERPL-MCNC: 54 MG/DL
HGB BLD-MCNC: 16.6 G/DL (ref 13–17.7)
HGB UR QL STRIP: NEGATIVE
IMM GRANULOCYTES # BLD AUTO: 0 X10E3/UL (ref 0–0.1)
IMM GRANULOCYTES NFR BLD AUTO: 1 %
KETONES UR QL STRIP: NEGATIVE
LDLC SERPL CALC-MCNC: 94 MG/DL (ref 0–99)
LEUKOCYTE ESTERASE UR QL STRIP: NEGATIVE
LYMPHOCYTES # BLD AUTO: 1.1 X10E3/UL (ref 0.7–3.1)
LYMPHOCYTES NFR BLD AUTO: 24 %
MAGNESIUM SERPL-MCNC: 2.2 MG/DL (ref 1.6–2.3)
MCH RBC QN AUTO: 31.2 PG (ref 26.6–33)
MCHC RBC AUTO-ENTMCNC: 33.7 G/DL (ref 31.5–35.7)
MCV RBC AUTO: 93 FL (ref 79–97)
MICRO URNS: NORMAL
MICRO URNS: NORMAL
MONOCYTES # BLD AUTO: 0.7 X10E3/UL (ref 0.1–0.9)
MONOCYTES NFR BLD AUTO: 14 %
NEUTROPHILS # BLD AUTO: 2.6 X10E3/UL (ref 1.4–7)
NEUTROPHILS NFR BLD AUTO: 56 %
NITRITE UR QL STRIP: NEGATIVE
PH UR STRIP: 7 [PH] (ref 5–7.5)
PLATELET # BLD AUTO: 218 X10E3/UL (ref 150–450)
POTASSIUM SERPL-SCNC: 4.1 MMOL/L (ref 3.5–5.2)
PROT SERPL-MCNC: 7.1 G/DL (ref 6–8.5)
PROT UR QL STRIP: NEGATIVE
RBC # BLD AUTO: 5.32 X10E6/UL (ref 4.14–5.8)
RBC #/AREA URNS HPF: NORMAL /HPF (ref 0–2)
SODIUM SERPL-SCNC: 142 MMOL/L (ref 134–144)
SP GR UR STRIP: 1.02 (ref 1–1.03)
T3FREE SERPL-MCNC: 3.4 PG/ML (ref 2–4.4)
T4 FREE SERPL-MCNC: 1.37 NG/DL (ref 0.82–1.77)
TRIGL SERPL-MCNC: 108 MG/DL (ref 0–149)
TSH SERPL DL<=0.005 MIU/L-ACNC: 2.59 UIU/ML (ref 0.45–4.5)
UROBILINOGEN UR STRIP-MCNC: 0.2 MG/DL (ref 0.2–1)
VIT B12 SERPL-MCNC: 883 PG/ML (ref 232–1245)
VLDLC SERPL CALC-MCNC: 19 MG/DL (ref 5–40)
WBC # BLD AUTO: 4.6 X10E3/UL (ref 3.4–10.8)
WBC #/AREA URNS HPF: NORMAL /HPF (ref 0–5)

## 2022-03-23 LAB
HAV IGM SERPL QL IA: NEGATIVE
HBV CORE IGM SERPL QL IA: NEGATIVE
HBV SURFACE AG SERPL QL IA: NEGATIVE
HCV AB S/CO SERPL IA: 0.1 S/CO RATIO (ref 0–0.9)
Lab: NORMAL
WRITTEN AUTHORIZATION: NORMAL

## 2022-05-09 ENCOUNTER — PRIOR AUTHORIZATION (OUTPATIENT)
Dept: FAMILY MEDICINE CLINIC | Facility: CLINIC | Age: 42
End: 2022-05-09

## 2022-05-09 NOTE — TELEPHONE ENCOUNTER
Request Reference Number: [id number]. [employer] CAP [address] DR is approved through 05/09/2023. Your patient may now fill this prescription and it will be covered.

## 2022-10-04 ENCOUNTER — OFFICE VISIT (OUTPATIENT)
Dept: FAMILY MEDICINE CLINIC | Facility: CLINIC | Age: 42
End: 2022-10-04

## 2022-10-04 VITALS
RESPIRATION RATE: 16 BRPM | TEMPERATURE: 98.5 F | WEIGHT: 247 LBS | DIASTOLIC BLOOD PRESSURE: 90 MMHG | HEART RATE: 95 BPM | OXYGEN SATURATION: 98 % | HEIGHT: 68 IN | SYSTOLIC BLOOD PRESSURE: 136 MMHG | BODY MASS INDEX: 37.44 KG/M2

## 2022-10-04 DIAGNOSIS — J06.9 UPPER RESPIRATORY TRACT INFECTION, UNSPECIFIED TYPE: Primary | ICD-10-CM

## 2022-10-04 DIAGNOSIS — R05.8 PRODUCTIVE COUGH: ICD-10-CM

## 2022-10-04 PROCEDURE — 99213 OFFICE O/P EST LOW 20 MIN: CPT

## 2022-10-04 RX ORDER — BENZONATATE 100 MG/1
100 CAPSULE ORAL 3 TIMES DAILY PRN
Qty: 30 CAPSULE | Refills: 1 | Status: SHIPPED | OUTPATIENT
Start: 2022-10-04 | End: 2022-10-11

## 2022-10-04 RX ORDER — DOXYCYCLINE HYCLATE 100 MG/1
100 CAPSULE ORAL 2 TIMES DAILY
Qty: 20 CAPSULE | Refills: 0 | Status: SHIPPED | OUTPATIENT
Start: 2022-10-04 | End: 2022-10-11 | Stop reason: ALTCHOICE

## 2022-10-04 RX ORDER — TIRZEPATIDE 2.5 MG/.5ML
INJECTION, SOLUTION SUBCUTANEOUS
COMMUNITY
Start: 2022-09-23 | End: 2022-12-12

## 2022-10-04 RX ORDER — CARVEDILOL 6.25 MG/1
TABLET ORAL
COMMUNITY
Start: 2022-09-19 | End: 2022-10-24

## 2022-10-04 NOTE — PROGRESS NOTES
Chief Complaint  Cough    Subjective         History of Present Illness    Usman Burrell 42 y.o. male who presents for evaluation of cough. Symptoms include cough described as productive of white and green sputum, myalgias, and headache.  Onset of symptoms was 1 week ago, gradually improving since that time. Patient denies shortness of breath, wheezing, upper respiratory congestion, hemoptysis, pleuritic chest pain, fever, dyspnea on exertion, ear drainage, eye discharge, diarrhea, vomiting, vertigo, sneezing, chills, sweats, sinus pain, epistaxis, high fever, and joint pain.   Evaluation to date: none Treatment to date:  Mucinex DM and NyQuil with some improvement.    He had a negative in-home Covid test on Thursday.  No known sick contacts and no Covid exposure.    He was started on Carvedilol 6.25 mg daily two weeks ago due to elevated blood pressure.  The patient reports he has missed at least one dose of the medication since starting.  The patient's initial blood pressure today was elevated.  A manual blood pressure recheck showed improvement.    He  denies medication side effects.    Review of Systems   Constitutional: Negative for appetite change, chills, fatigue and fever.   HENT: Negative for congestion, ear pain, facial swelling, nosebleeds, sinus pressure, sore throat, tinnitus and trouble swallowing.    Eyes: Negative for visual disturbance.   Respiratory: Positive for cough (productive, white-green sputum). Negative for chest tightness, shortness of breath and wheezing.    Cardiovascular: Negative for chest pain, palpitations and leg swelling.   Gastrointestinal: Negative for abdominal pain, constipation, diarrhea, nausea and vomiting.   Genitourinary: Negative for dysuria, frequency and urgency.   Musculoskeletal: Positive for myalgias. Negative for gait problem and neck pain.   Skin: Negative for rash.   Neurological: Negative for dizziness, syncope, weakness and light-headedness.  "  Psychiatric/Behavioral: Negative for dysphoric mood. The patient is not nervous/anxious.         Objective   Vital Signs:   /90   Pulse 95   Temp 98.5 °F (36.9 °C)   Resp 16   Ht 172.7 cm (68\")   Wt 112 kg (247 lb)   SpO2 98%   BMI 37.56 kg/m²        Physical Exam  Vitals and nursing note reviewed.   Constitutional:       General: He is not in acute distress.     Appearance: He is well-developed. He is not toxic-appearing or diaphoretic.   HENT:      Head: Normocephalic and atraumatic. Hair is normal.      Right Ear: External ear normal. No drainage, swelling or tenderness. Tympanic membrane is not retracted.      Left Ear: External ear normal. No drainage, swelling or tenderness. Tympanic membrane is not retracted.      Nose: No mucosal edema, congestion or rhinorrhea.      Right Sinus: No maxillary sinus tenderness or frontal sinus tenderness.      Left Sinus: No maxillary sinus tenderness or frontal sinus tenderness.      Mouth/Throat:      Mouth: Mucous membranes are moist. No oral lesions.      Pharynx: Uvula midline. No pharyngeal swelling, oropharyngeal exudate, posterior oropharyngeal erythema or uvula swelling.      Tonsils: No tonsillar exudate or tonsillar abscesses.   Eyes:      General: No scleral icterus.        Right eye: No discharge.         Left eye: No discharge.      Conjunctiva/sclera: Conjunctivae normal.      Pupils: Pupils are equal, round, and reactive to light.   Cardiovascular:      Rate and Rhythm: Normal rate and regular rhythm.      Heart sounds: Normal heart sounds. No murmur heard.    No gallop.   Pulmonary:      Effort: No prolonged expiration or respiratory distress.      Breath sounds: Normal breath sounds. No stridor or decreased air movement. No decreased breath sounds, wheezing, rhonchi or rales.      Comments: No respiratory distress.  Pulmonary effort is normal.  Oxygen saturation is 98% on room air.  Chest:      Chest wall: No tenderness.   Abdominal:      " Palpations: Abdomen is soft.      Tenderness: There is no abdominal tenderness.   Musculoskeletal:      Cervical back: Normal range of motion and neck supple.   Lymphadenopathy:      Cervical: No cervical adenopathy.   Skin:     General: Skin is warm and dry.      Findings: No rash.   Neurological:      Mental Status: He is alert and oriented to person, place, and time.      Motor: No abnormal muscle tone.   Psychiatric:         Behavior: Behavior normal.         Thought Content: Thought content normal.         Judgment: Judgment normal.                         Assessment and Plan      Diagnoses and all orders for this visit:    1. Upper respiratory tract infection, unspecified type (Primary)  -     doxycycline (VIBRAMYCIN) 100 MG capsule; Take 1 capsule by mouth 2 (Two) Times a Day for 10 days.  Dispense: 20 capsule; Refill: 0  -     benzonatate (Tessalon Perles) 100 MG capsule; Take 1 capsule by mouth 3 (Three) Times a Day As Needed for Cough.  Dispense: 30 capsule; Refill: 1    2. Productive cough  -     doxycycline (VIBRAMYCIN) 100 MG capsule; Take 1 capsule by mouth 2 (Two) Times a Day for 10 days.  Dispense: 20 capsule; Refill: 0  -     benzonatate (Tessalon Perles) 100 MG capsule; Take 1 capsule by mouth 3 (Three) Times a Day As Needed for Cough.  Dispense: 30 capsule; Refill: 1            Follow Up     Return if symptoms worsen or fail to improve.    Patient was given instructions and counseling regarding his condition or for health maintenance advice. Please see specific information pulled into the AVS if appropriate.     -Take medication as prescribed.  -Covid test was negative Thursday.  -Monitor for fever and take Tylenol as needed.  Drink plenty of fluids and get plenty of rest.  -Use cool-mist humidifier as needed.  -Seek immediate medical attention for fever unrelieved by Tylenol, chest pain, shortness of breath, decreased oxygen saturations, sharp pain with breathing, or any other worsening signs or  symptoms.  -The patient's manual blood pressure recheck showed improvement.  The patient was instructed to take Carvedilol as directed, monitor blood pressures at home, and report elevated readings to his PCP Esther Patel PA-C.

## 2022-10-11 ENCOUNTER — OFFICE VISIT (OUTPATIENT)
Dept: FAMILY MEDICINE CLINIC | Facility: CLINIC | Age: 42
End: 2022-10-11

## 2022-10-11 VITALS
TEMPERATURE: 98.4 F | HEIGHT: 60 IN | OXYGEN SATURATION: 96 % | DIASTOLIC BLOOD PRESSURE: 98 MMHG | RESPIRATION RATE: 16 BRPM | SYSTOLIC BLOOD PRESSURE: 130 MMHG | WEIGHT: 248 LBS | BODY MASS INDEX: 48.69 KG/M2 | HEART RATE: 93 BPM

## 2022-10-11 DIAGNOSIS — R05.8 PRODUCTIVE COUGH: ICD-10-CM

## 2022-10-11 DIAGNOSIS — J06.9 UPPER RESPIRATORY TRACT INFECTION, UNSPECIFIED TYPE: Primary | ICD-10-CM

## 2022-10-11 PROCEDURE — 99213 OFFICE O/P EST LOW 20 MIN: CPT

## 2022-10-11 RX ORDER — AZITHROMYCIN 250 MG/1
TABLET, FILM COATED ORAL
Qty: 6 TABLET | Refills: 0 | Status: SHIPPED | OUTPATIENT
Start: 2022-10-11 | End: 2022-10-16

## 2022-10-11 RX ORDER — DEXTROMETHORPHAN HYDROBROMIDE AND PROMETHAZINE HYDROCHLORIDE 15; 6.25 MG/5ML; MG/5ML
5 SYRUP ORAL 4 TIMES DAILY PRN
Qty: 118 ML | Refills: 0 | Status: SHIPPED | OUTPATIENT
Start: 2022-10-11 | End: 2022-10-24 | Stop reason: SDDI

## 2022-10-11 NOTE — PROGRESS NOTES
Chief Complaint  Cough (Pt states cough x 2 weeks)    Subjective         History of Present Illness    Usman Burrell 42 y.o. male who presents today for a follow up on upper respiratory infection and productive cough.  The patient was seen on 10/04/2022 and was prescribed Doxycycline 100 mg twice daily for 10 days and Tessalon perles three times daily as needed for cough.    Today, the patient reports the cough has continued.  He has taken 8 days of Doxycycline.  He stopped taking the Tessalon perles two days ago due to no improvement in cough.  He is also taking Mucinex.  He had a negative COVID test prior to his last appointment.  He reports he has not had any COVID exposure since then.    He has a history of asthma as a child.  He does not use an inhaler.  He has not used an inhaler for at least three years.    His blood pressure is elevated today.  He was started on Carvedilol 6.25 mg twice daily at a weight loss center around three weeks ago.  He reports he has missed two doses.  He reports intermittent headaches that have improved.  He denies chest pain, shortness of breath, dizziness, lightheadedness, nausea, vomiting, near-syncope, and syncope.  He is going to make an appointment today with his PCP to follow-up on blood pressure.    He  denies medication side effects.    Review of Systems   Constitutional: Negative for appetite change, chills, fatigue and fever.   HENT: Negative for congestion, sinus pressure and trouble swallowing.    Eyes: Negative for visual disturbance.   Respiratory: Positive for cough (productive) and wheezing. Negative for chest tightness and shortness of breath.    Cardiovascular: Negative for chest pain, palpitations and leg swelling.   Gastrointestinal: Negative for abdominal pain, constipation, diarrhea, nausea and vomiting.   Genitourinary: Negative for dysuria, frequency and urgency.   Musculoskeletal: Negative for gait problem, myalgias and neck pain.   Skin: Negative for rash.  "  Neurological: Negative for dizziness, syncope, facial asymmetry, speech difficulty, weakness and light-headedness.   Psychiatric/Behavioral: Negative for dysphoric mood. The patient is not nervous/anxious.         Objective   Vital Signs:   /98   Pulse 93   Temp 98.4 °F (36.9 °C)   Resp 16   Ht 68 cm (26.77\")   Wt 112 kg (248 lb)   SpO2 96%   .27 kg/m²        Physical Exam  Vitals and nursing note reviewed.   Constitutional:       General: He is not in acute distress.     Appearance: He is well-developed. He is not toxic-appearing or diaphoretic.   HENT:      Head: Normocephalic and atraumatic. Hair is normal.      Right Ear: External ear normal. No drainage, swelling or tenderness. Tympanic membrane is not retracted.      Left Ear: External ear normal. No drainage, swelling or tenderness. Tympanic membrane is not retracted.      Nose: No mucosal edema, congestion or rhinorrhea.      Right Sinus: No maxillary sinus tenderness or frontal sinus tenderness.      Left Sinus: No maxillary sinus tenderness or frontal sinus tenderness.      Mouth/Throat:      Mouth: Mucous membranes are moist. No oral lesions.      Pharynx: Uvula midline. No pharyngeal swelling, oropharyngeal exudate, posterior oropharyngeal erythema or uvula swelling.      Tonsils: No tonsillar exudate or tonsillar abscesses.   Eyes:      General: No scleral icterus.        Right eye: No discharge.         Left eye: No discharge.      Conjunctiva/sclera: Conjunctivae normal.      Pupils: Pupils are equal, round, and reactive to light.   Cardiovascular:      Rate and Rhythm: Normal rate and regular rhythm.      Pulses: Normal pulses.      Heart sounds: Normal heart sounds. No murmur heard.    No gallop.   Pulmonary:      Effort: No prolonged expiration or respiratory distress.      Breath sounds: Normal breath sounds. No stridor or decreased air movement. No decreased breath sounds, wheezing, rhonchi or rales.      Comments: No " respiratory distress.  Pulmonary effort is normal.  Oxygen saturation is 96% on room air.  Chest:      Chest wall: No tenderness.   Abdominal:      Palpations: Abdomen is soft.      Tenderness: There is no abdominal tenderness.   Musculoskeletal:      Cervical back: Normal range of motion and neck supple.   Lymphadenopathy:      Cervical: No cervical adenopathy.   Skin:     General: Skin is warm and dry.      Findings: No rash.   Neurological:      Mental Status: He is alert and oriented to person, place, and time.      Motor: No abnormal muscle tone.   Psychiatric:         Behavior: Behavior normal.         Thought Content: Thought content normal.         Judgment: Judgment normal.                         Assessment and Plan      Diagnoses and all orders for this visit:    1. Upper respiratory tract infection, unspecified type (Primary)  -     azithromycin (Zithromax Z-Jose) 250 MG tablet; Take 2 tablets the first day, then 1 tablet daily for 4 days.  Dispense: 6 tablet; Refill: 0  -     promethazine-dextromethorphan (PROMETHAZINE-DM) 6.25-15 MG/5ML syrup; Take 5 mL by mouth 4 (Four) Times a Day As Needed for Cough.  Dispense: 118 mL; Refill: 0    2. Productive cough  -     azithromycin (Zithromax Z-Jose) 250 MG tablet; Take 2 tablets the first day, then 1 tablet daily for 4 days.  Dispense: 6 tablet; Refill: 0  -     promethazine-dextromethorphan (PROMETHAZINE-DM) 6.25-15 MG/5ML syrup; Take 5 mL by mouth 4 (Four) Times a Day As Needed for Cough.  Dispense: 118 mL; Refill: 0            Follow Up     Return if symptoms worsen or fail to improve.    Patient was given instructions and counseling regarding his condition or for health maintenance advice. Please see specific information pulled into the AVS if appropriate.     -Take medication as prescribed.  Start a probiotic.  -Covid test was negative prior to his last appointment.  No further COVID exposure.  -Monitor for fever and take Tylenol as needed.  Drink plenty  of fluids and get plenty of rest.  -Use cool-mist humidifier as needed.  -Seek immediate medical attention for fever unrelieved by Tylenol, chest pain, shortness of breath, decreased oxygen saturations, sharp pain with breathing, or any other worsening signs or symptoms.  -The patient's manual blood pressure recheck showed improvement.  The patient was instructed to take Carvedilol as directed, monitor blood pressures at home, and make an appointment with his PCP Esther Patel PA-C.

## 2022-10-24 ENCOUNTER — OFFICE VISIT (OUTPATIENT)
Dept: FAMILY MEDICINE CLINIC | Facility: CLINIC | Age: 42
End: 2022-10-24

## 2022-10-24 VITALS
WEIGHT: 245 LBS | HEART RATE: 96 BPM | DIASTOLIC BLOOD PRESSURE: 90 MMHG | HEIGHT: 60 IN | RESPIRATION RATE: 16 BRPM | OXYGEN SATURATION: 98 % | SYSTOLIC BLOOD PRESSURE: 122 MMHG | BODY MASS INDEX: 48.1 KG/M2 | TEMPERATURE: 97.5 F

## 2022-10-24 DIAGNOSIS — R79.89 LFT ELEVATION: ICD-10-CM

## 2022-10-24 DIAGNOSIS — K21.9 GASTROESOPHAGEAL REFLUX DISEASE WITHOUT ESOPHAGITIS: ICD-10-CM

## 2022-10-24 DIAGNOSIS — R07.81 RIB PAIN ON LEFT SIDE: ICD-10-CM

## 2022-10-24 DIAGNOSIS — I10 BENIGN ESSENTIAL HTN: Primary | ICD-10-CM

## 2022-10-24 DIAGNOSIS — F33.41 RECURRENT MAJOR DEPRESSIVE DISORDER, IN PARTIAL REMISSION: ICD-10-CM

## 2022-10-24 DIAGNOSIS — E55.9 VITAMIN D DEFICIENCY: ICD-10-CM

## 2022-10-24 PROCEDURE — 99214 OFFICE O/P EST MOD 30 MIN: CPT | Performed by: PHYSICIAN ASSISTANT

## 2022-10-24 RX ORDER — LISINOPRIL 2.5 MG/1
2.5 TABLET ORAL DAILY
Qty: 30 TABLET | Refills: 1 | Status: SHIPPED | OUTPATIENT
Start: 2022-10-24 | End: 2022-11-04 | Stop reason: SINTOL

## 2022-10-24 RX ORDER — CARVEDILOL 12.5 MG/1
12.5 TABLET ORAL 2 TIMES DAILY WITH MEALS
Qty: 180 TABLET | Refills: 1 | Status: SHIPPED | OUTPATIENT
Start: 2022-10-24 | End: 2022-12-12

## 2022-10-24 RX ORDER — BUPROPION HYDROCHLORIDE 150 MG/1
150 TABLET ORAL DAILY
Qty: 90 TABLET | Refills: 1 | Status: SHIPPED | OUTPATIENT
Start: 2022-10-24 | End: 2022-11-10

## 2022-10-24 RX ORDER — CARVEDILOL 12.5 MG/1
12.5 TABLET ORAL 2 TIMES DAILY WITH MEALS
COMMUNITY
End: 2022-10-24 | Stop reason: SDUPTHER

## 2022-10-24 NOTE — PROGRESS NOTES
Subjective   Usman Burrell is a 42 y.o. male.     History of Present Illness      Since the last visit, he has overall felt fairly well.  He has Primary Hypertension not at goal, plan to add/adjust medication, GERD controlled on PPI Rx and Vitamin D deficiency and labs are at goal >30 ng/mL.  he has been compliant with current medications have reviewed them.  The patient denies medication side effects.  Will refill medications. There were no vitals taken for this visit.  The 10-year ASCVD risk score (Tr OVIEDO, et al., 2019) is: 1.3%    Values used to calculate the score:      Age: 42 years      Sex: Male      Is Non- : No      Diabetic: No      Tobacco smoker: No      Systolic Blood Pressure: 122 mmHg      Is BP treated: Yes      HDL Cholesterol: 54 mg/dL      Total Cholesterol: 167 mg/dL    Results for orders placed or performed in visit on 03/09/22   Comprehensive metabolic panel    Specimen: Blood   Result Value Ref Range    Glucose 84 65 - 99 mg/dL    BUN 18 6 - 24 mg/dL    Creatinine 1.11 0.76 - 1.27 mg/dL    EGFR Result 84 >59 mL/min/1.73    BUN/Creatinine Ratio 16 9 - 20    Sodium 142 134 - 144 mmol/L    Potassium 4.1 3.5 - 5.2 mmol/L    Chloride 103 96 - 106 mmol/L    Total CO2 24 20 - 29 mmol/L    Calcium 9.5 8.7 - 10.2 mg/dL    Total Protein 7.1 6.0 - 8.5 g/dL    Albumin 4.7 4.0 - 5.0 g/dL    Globulin 2.4 1.5 - 4.5 g/dL    A/G Ratio 2.0 1.2 - 2.2    Total Bilirubin 0.6 0.0 - 1.2 mg/dL    Alkaline Phosphatase 71 44 - 121 IU/L    AST (SGOT) 24 0 - 40 IU/L    ALT (SGPT) 47 (H) 0 - 44 IU/L   Lipid panel    Specimen: Blood   Result Value Ref Range    Total Cholesterol 167 100 - 199 mg/dL    Triglycerides 108 0 - 149 mg/dL    HDL Cholesterol 54 >39 mg/dL    VLDL Cholesterol Jewel 19 5 - 40 mg/dL    LDL Chol Calc (NIH) 94 0 - 99 mg/dL   TSH    Specimen: Blood   Result Value Ref Range    TSH 2.590 0.450 - 4.500 uIU/mL   T3, Free    Specimen: Blood   Result Value Ref Range    T3, Free 3.4 2.0  - 4.4 pg/mL   T4, Free    Specimen: Blood   Result Value Ref Range    Free T4 1.37 0.82 - 1.77 ng/dL   Vitamin B12    Specimen: Blood   Result Value Ref Range    Vitamin B-12 883 232 - 1,245 pg/mL   Folate    Specimen: Blood   Result Value Ref Range    Folate 9.2 >3.0 ng/mL   Vitamin D 25 Hydroxy    Specimen: Blood   Result Value Ref Range    25 Hydroxy, Vitamin D 37.7 30.0 - 100.0 ng/mL   Magnesium    Specimen: Blood   Result Value Ref Range    Magnesium 2.2 1.6 - 2.3 mg/dL   Microscopic Examination -   Result Value Ref Range    WBC, UA None seen 0 - 5 /hpf    RBC, UA None seen 0 - 2 /hpf    Epithelial Cells (non renal) None seen 0 - 10 /hpf    Casts None seen None seen /lpf    Bacteria, UA None seen None seen/Few   Hepatitis Panel, Acute   Result Value Ref Range    Hep A IgM Negative Negative    Hepatitis B Surface Ag Negative Negative    Hep B Core IgM Negative Negative    Hep C Virus Ab 0.1 0.0 - 0.9 s/co ratio   Please Note   Result Value Ref Range    Please note Comment    Written Authorization   Result Value Ref Range    Written Authorization Comment    CBC and Differential    Specimen: Blood   Result Value Ref Range    WBC 4.6 3.4 - 10.8 x10E3/uL    RBC 5.32 4.14 - 5.80 x10E6/uL    Hemoglobin 16.6 13.0 - 17.7 g/dL    Hematocrit 49.2 37.5 - 51.0 %    MCV 93 79 - 97 fL    MCH 31.2 26.6 - 33.0 pg    MCHC 33.7 31.5 - 35.7 g/dL    RDW 12.8 11.6 - 15.4 %    Platelets 218 150 - 450 x10E3/uL    Neutrophil Rel % 56 Not Estab. %    Lymphocyte Rel % 24 Not Estab. %    Monocyte Rel % 14 Not Estab. %    Eosinophil Rel % 3 Not Estab. %    Basophil Rel % 2 Not Estab. %    Neutrophils Absolute 2.6 1.4 - 7.0 x10E3/uL    Lymphocytes Absolute 1.1 0.7 - 3.1 x10E3/uL    Monocytes Absolute 0.7 0.1 - 0.9 x10E3/uL    Eosinophils Absolute 0.1 0.0 - 0.4 x10E3/uL    Basophils Absolute 0.1 0.0 - 0.2 x10E3/uL    Immature Granulocyte Rel % 1 Not Estab. %    Immature Grans Absolute 0.0 0.0 - 0.1 x10E3/uL   Urinalysis With Microscopic -  Urine, Clean Catch    Specimen: Urine, Clean Catch   Result Value Ref Range    Specific Gravity, UA 1.022 1.005 - 1.030    pH, UA 7.0 5.0 - 7.5    Color, UA Yellow Yellow    Appearance, UA Clear Clear    Leukocytes, UA Negative Negative    Protein Negative Negative/Trace    Glucose, UA Negative Negative    Ketones Negative Negative    Blood, UA Negative Negative    Bilirubin, UA Negative Negative    Urobilinogen, UA 0.2 0.2 - 1.0 mg/dL    Nitrite, UA Negative Negative    Microscopic Examination Comment     Microscopic Examination See below:       Esther Patel PA-C   3/22/2022  7:31 AM EDT       Add acute hepatitis panel due to elevated ALT.elevated liver enzymes can also be from weight gain and work on diet exercise and weight loss.  Cholesterol is about the same and also advise diet and exercise.  Other labs okay.  Plan to repeat liver enzymes in 3 to 4 months in order made     Noting acute hepatitis panel was added and negative.  Patient still needs follow-up on LFTs      Had visit with [facility] for upper respiratory infection 10/4/2022 and treated with doxycycline.  Has been having left distal costal margin pain with palpation and range of motion with all the coughing he has been doing.  More noticed in the last few days.  Cough is resolving.  No trauma we will still x-ray  Patient messaged me 4/29/2022 about tapering off Zoloft--I gave him taper schedule  Evaluation cardiology atypical chest pain 1/21/2021 Dr. Penn noting normal EKG.  Observe due to symptoms resolved  Going to weight loss clinic. ---started him on Coreg for bp and hx palp  Started Wellbutrin for depression ----Wellbutrin XL 150mg X 2 weeks for depression working---no anxiety    125/80  X-Ray  Interpretation report in house X-rays that I personally viewed    Relevant Clinical Issues/Diagnoses/Indications: Left costal margin rib pain since coughing        Clinical Findings: Lungs clear heart size normal, no rib fracture or  mass          Comparative Data:  none          Date of Previous X-ray:    Change on current X-ray:      The following portions of the patient's history were reviewed and updated as appropriate: allergies, current medications, past family history, past medical history, past social history, past surgical history and problem list.    Review of Systems   Constitutional: Negative for activity change, appetite change and unexpected weight change.   HENT: Negative for nosebleeds and trouble swallowing.    Eyes: Negative for pain and visual disturbance.   Respiratory: Negative for chest tightness, shortness of breath and wheezing.    Cardiovascular: Negative for chest pain and palpitations.   Gastrointestinal: Negative for abdominal pain and blood in stool.   Endocrine: Negative.    Genitourinary: Negative for difficulty urinating and hematuria.   Musculoskeletal: Positive for arthralgias. Negative for joint swelling and neck pain.   Skin: Negative for color change and rash.   Allergic/Immunologic: Negative.    Neurological: Positive for headaches. Negative for syncope and speech difficulty.   Hematological: Negative for adenopathy.   Psychiatric/Behavioral: Negative for agitation and confusion.   All other systems reviewed and are negative.      Objective   Physical Exam  Vitals and nursing note reviewed.   Constitutional:       General: He is not in acute distress.     Appearance: He is well-developed. He is obese. He is not diaphoretic.   HENT:      Head: Normocephalic.   Eyes:      General: No scleral icterus.     Conjunctiva/sclera: Conjunctivae normal.      Pupils: Pupils are equal, round, and reactive to light.   Cardiovascular:      Rate and Rhythm: Normal rate and regular rhythm.      Pulses: Normal pulses.      Heart sounds: Normal heart sounds. No murmur heard.     Comments: Varicose vein RLE  Pulmonary:      Effort: Pulmonary effort is normal.      Breath sounds: Normal breath sounds. No rales.    Musculoskeletal:         General: Tenderness present. Normal range of motion.      Cervical back: Normal range of motion and neck supple.      Right lower leg: No edema.      Left lower leg: No edema.      Comments: Sore to palpate mid axillary line distal costal margin left--range of motion pain--- no rash and not red   Skin:     General: Skin is warm and dry.      Findings: No lesion or rash.   Neurological:      General: No focal deficit present.      Mental Status: He is alert and oriented to person, place, and time.   Psychiatric:         Mood and Affect: Affect is not inappropriate.         Behavior: Behavior normal.         Thought Content: Thought content normal.         Judgment: Judgment normal.         Assessment & Plan   Diagnoses and all orders for this visit:    1. Benign essential HTN (Primary)  Comments:  not controlled--add Lisinopril 2.5mg and cont Coreg    2. Gastroesophageal reflux disease without esophagitis  Comments:  Nexium working    3. Vitamin D deficiency    4. Recurrent major depressive disorder, in partial remission (HCC)  Comments:  Wellbutrin XL helping    5. Rib pain on left side  -     XR Ribs Left With PA Chest    6. LFT elevation    Other orders  -     buPROPion XL (Wellbutrin XL) 150 MG 24 hr tablet; Take 1 tablet by mouth Daily. For depression  Dispense: 90 tablet; Refill: 1  -     carvedilol (COREG) 12.5 MG tablet; Take 1 tablet by mouth 2 (Two) Times a Day With Meals. For BP  Dispense: 180 tablet; Refill: 1  -     lisinopril (Zestril) 2.5 MG tablet; Take 1 tablet by mouth Daily. For BP  Dispense: 30 tablet; Refill: 1      Do want f/u on LFTs  Continue Wellbutrin  mg in a.m. working well for depression and will let me know if he develops anxiety  Continue Mounjaro with weight loss clinic and will want to recheck liver enzymes in 3 months to make sure coming down with weight  Plan, Usman Burrell, was seen today.  he was seen for HTN and continue medication, GERD and will  continue on PPI medication and Vitamin D deficiency and supplemented.  Rib pain getting x-ray--- strain--apply moist heat  See me for blood pressure check in 3 weeks and will add low-dose lisinopril and message me if ACE cough           Answers for HPI/ROS submitted by the patient on 10/24/2022  What is the primary reason for your visit?: High Blood Pressure

## 2022-11-04 ENCOUNTER — PATIENT MESSAGE (OUTPATIENT)
Dept: FAMILY MEDICINE CLINIC | Facility: CLINIC | Age: 42
End: 2022-11-04

## 2022-11-04 RX ORDER — METHYLPREDNISOLONE 4 MG/1
TABLET ORAL
Qty: 21 TABLET | Refills: 0 | Status: SHIPPED | OUTPATIENT
Start: 2022-11-04 | End: 2022-11-10 | Stop reason: SDDI

## 2022-11-04 RX ORDER — AMLODIPINE BESYLATE 2.5 MG/1
2.5 TABLET ORAL DAILY
Qty: 30 TABLET | Refills: 1 | Status: SHIPPED | OUTPATIENT
Start: 2022-11-04 | End: 2022-12-12 | Stop reason: SDUPTHER

## 2022-11-04 NOTE — TELEPHONE ENCOUNTER
From: Usman Burrell  To: Esther Patel PA-C  Sent: 11/4/2022 11:40 AM EDT  Subject: Lisinopril    Since starting this I have the constant feeling to clear my throat and feels like throat is swelling, today the swelling feeling is worse .

## 2022-11-10 ENCOUNTER — OFFICE VISIT (OUTPATIENT)
Dept: FAMILY MEDICINE CLINIC | Facility: CLINIC | Age: 42
End: 2022-11-10

## 2022-11-10 VITALS
RESPIRATION RATE: 16 BRPM | OXYGEN SATURATION: 98 % | WEIGHT: 244 LBS | BODY MASS INDEX: 47.91 KG/M2 | DIASTOLIC BLOOD PRESSURE: 84 MMHG | TEMPERATURE: 97.5 F | HEIGHT: 60 IN | HEART RATE: 99 BPM | SYSTOLIC BLOOD PRESSURE: 118 MMHG

## 2022-11-10 DIAGNOSIS — F41.1 GENERALIZED ANXIETY DISORDER: ICD-10-CM

## 2022-11-10 DIAGNOSIS — I10 PRIMARY HYPERTENSION: ICD-10-CM

## 2022-11-10 DIAGNOSIS — F41.0 PANIC ATTACKS: ICD-10-CM

## 2022-11-10 DIAGNOSIS — K21.00 GASTROESOPHAGEAL REFLUX DISEASE WITH ESOPHAGITIS WITHOUT HEMORRHAGE: ICD-10-CM

## 2022-11-10 DIAGNOSIS — J01.01 ACUTE RECURRENT MAXILLARY SINUSITIS: Primary | ICD-10-CM

## 2022-11-10 PROBLEM — F41.9 ANXIETY: Status: ACTIVE | Noted: 2022-11-10

## 2022-11-10 PROBLEM — K21.9 GERD (GASTROESOPHAGEAL REFLUX DISEASE): Status: ACTIVE | Noted: 2022-11-10

## 2022-11-10 PROCEDURE — 99214 OFFICE O/P EST MOD 30 MIN: CPT | Performed by: PHYSICIAN ASSISTANT

## 2022-11-10 RX ORDER — LORAZEPAM 0.5 MG/1
0.5 TABLET ORAL EVERY 8 HOURS PRN
Qty: 20 TABLET | Refills: 0 | Status: SHIPPED | OUTPATIENT
Start: 2022-11-10

## 2022-11-10 RX ORDER — AZITHROMYCIN 250 MG/1
TABLET, FILM COATED ORAL
Qty: 6 TABLET | Refills: 1 | Status: SHIPPED | OUTPATIENT
Start: 2022-11-10 | End: 2022-12-12

## 2022-11-10 RX ORDER — ESOMEPRAZOLE MAGNESIUM 40 MG/1
40 CAPSULE, DELAYED RELEASE ORAL 2 TIMES DAILY
Qty: 180 CAPSULE | Refills: 1 | Status: SHIPPED | OUTPATIENT
Start: 2022-11-10 | End: 2022-12-12 | Stop reason: SDUPTHER

## 2022-11-10 NOTE — PATIENT INSTRUCTIONS
Generalized Anxiety Disorder, Adult  Generalized anxiety disorder (NOLAN) is a mental health condition. Unlike normal worries, anxiety related to NOLAN is not triggered by a specific event. These worries do not fade or get better with time. NOLAN interferes with relationships, work, and school.  NOLAN symptoms can vary from mild to severe. People with severe NOLAN can have intense waves of anxiety with physical symptoms that are similar to panic attacks.  What are the causes?  The exact cause of NOLAN is not known, but the following are believed to have an impact:  Differences in natural brain chemicals.  Genes passed down from parents to children.  Differences in the way threats are perceived.  Development and stress during childhood.  Personality.  What increases the risk?  The following factors may make you more likely to develop this condition:  Being female.  Having a family history of anxiety disorders.  Being very shy.  Experiencing very stressful life events, such as the death of a loved one.  Having a very stressful family environment.  What are the signs or symptoms?  People with NOLAN often worry excessively about many things in their lives, such as their health and family. Symptoms may also include:  Mental and emotional symptoms:  Worrying excessively about natural disasters.  Fear of being late.  Difficulty concentrating.  Fears that others are judging your performance.  Physical symptoms:  Fatigue.  Headaches, muscle tension, muscle twitches, trembling, or feeling shaky.  Feeling like your heart is pounding or beating very fast.  Feeling out of breath or like you cannot take a deep breath.  Having trouble falling asleep or staying asleep, or experiencing restlessness.  Sweating.  Nausea, diarrhea, or irritable bowel syndrome (IBS).  Behavioral symptoms:  Experiencing erratic moods or irritability.  Avoidance of new situations.  Avoidance of people.  Extreme difficulty making decisions.  How is this diagnosed?  This  condition is diagnosed based on your symptoms and medical history. You will also have a physical exam. Your health care provider may perform tests to rule out other possible causes of your symptoms.  To be diagnosed with NOLAN, a person must have anxiety that:  Is out of his or her control.  Affects several different aspects of his or her life, such as work and relationships.  Causes distress that makes him or her unable to take part in normal activities.  Includes at least three symptoms of NOLAN, such as restlessness, fatigue, trouble concentrating, irritability, muscle tension, or sleep problems.  Before your health care provider can confirm a diagnosis of NOLAN, these symptoms must be present more days than they are not, and they must last for 6 months or longer.  How is this treated?  This condition may be treated with:  Medicine. Antidepressant medicine is usually prescribed for long-term daily control. Anti-anxiety medicines may be added in severe cases, especially when panic attacks occur.  Talk therapy (psychotherapy). Certain types of talk therapy can be helpful in treating NOLAN by providing support, education, and guidance. Options include:  Cognitive behavioral therapy (CBT). People learn coping skills and self-calming techniques to ease their physical symptoms. They learn to identify unrealistic thoughts and behaviors and to replace them with more appropriate thoughts and behaviors.  Acceptance and commitment therapy (ACT). This treatment teaches people how to be mindful as a way to cope with unwanted thoughts and feelings.  Biofeedback. This process trains you to manage your body's response (physiological response) through breathing techniques and relaxation methods. You will work with a therapist while machines are used to monitor your physical symptoms.  Stress management techniques. These include yoga, meditation, and exercise.  A mental health specialist can help determine which treatment is best for you.  Some people see improvement with one type of therapy. However, other people require a combination of therapies.  Follow these instructions at home:  Lifestyle  Maintain a consistent routine and schedule.  Anticipate stressful situations. Create a plan and allow extra time to work with your plan.  Practice stress management or self-calming techniques that you have learned from your therapist or your health care provider.  Exercise regularly and spend time outdoors.  Eat a healthy diet that includes plenty of vegetables, fruits, whole grains, low-fat dairy products, and lean protein.  Do not eat a lot of foods that are high in fat, added sugar, or salt (sodium).  Drink plenty of water.  Avoid alcohol. Alcohol can increase anxiety.  Avoid caffeine and certain over-the-counter cold medicines. These may make you feel worse. Ask your pharmacist which medicines to avoid.  General instructions  Take over-the-counter and prescription medicines only as told by your health care provider.  Understand that you are likely to have setbacks. Accept this and be kind to yourself as you persist to take better care of yourself.  Anticipate stressful situations. Create a plan and allow extra time to work with your plan.  Recognize and accept your accomplishments, even if you  them as small.  Spend time with people who care about you.  Keep all follow-up visits. This is important.  Where to find more information  National [place] of Mental Health: www.nimh.nih.gov  Substance Abuse and Mental Health Services: www.samhsa.gov  Contact a health care provider if:  Your symptoms do not get better.  Your symptoms get worse.  You have signs of depression, such as:  A persistently sad or irritable mood.  Loss of enjoyment in activities that used to bring you rylee.  Change in weight or eating.  Changes in sleeping habits.  Get help right away if:  You have thoughts about hurting yourself or others.  If you ever feel like you may hurt  yourself or others, or have thoughts about taking your own life, get help right away. Go to your nearest emergency department or:  Call your local emergency services (811 in the [place]  Call a suicide crisis helpline, such as the National Suicide Prevention Lifeline at 1-776.587.2093 or 113 in the [place] This is open 24 hours a day in the [place]  Text the Crisis Text Line at 284177 (in the [place]  Summary  Generalized anxiety disorder (NOLAN) is a mental health condition that involves worry that is not triggered by a specific event.  People with NOLAN often worry excessively about many things in their lives, such as their health and family.  NOLAN may cause symptoms such as restlessness, trouble concentrating, sleep problems, frequent sweating, nausea, diarrhea, headaches, and trembling or muscle twitching.  A mental health specialist can help determine which treatment is best for you. Some people see improvement with one type of therapy. However, other people require a combination of therapies.  This information is not intended to replace advice given to you by your health care provider. Make sure you discuss any questions you have with your health care provider.  Document Revised: 07/13/2022 Document Reviewed: 04/10/2022  Elsevier Patient Education © 2022 [employer]

## 2022-11-11 ENCOUNTER — PRIOR AUTHORIZATION (OUTPATIENT)
Dept: FAMILY MEDICINE CLINIC | Facility: CLINIC | Age: 42
End: 2022-11-11

## 2022-11-15 NOTE — TELEPHONE ENCOUNTER
Approvedon November 11  PA Case: [id number], Status: Approved, Coverage Starts on: 11/11/2022 12:00:00 AM, Coverage Ends on: 11/11/2023 12:00:00 AM.

## 2022-12-12 ENCOUNTER — OFFICE VISIT (OUTPATIENT)
Dept: FAMILY MEDICINE CLINIC | Facility: CLINIC | Age: 42
End: 2022-12-12

## 2022-12-12 VITALS
TEMPERATURE: 97.5 F | HEIGHT: 60 IN | DIASTOLIC BLOOD PRESSURE: 60 MMHG | SYSTOLIC BLOOD PRESSURE: 118 MMHG | RESPIRATION RATE: 16 BRPM | HEART RATE: 66 BPM | WEIGHT: 245.2 LBS | BODY MASS INDEX: 48.14 KG/M2 | OXYGEN SATURATION: 95 %

## 2022-12-12 DIAGNOSIS — R79.89 LFT ELEVATION: ICD-10-CM

## 2022-12-12 DIAGNOSIS — E53.8 LOW FOLIC ACID: ICD-10-CM

## 2022-12-12 DIAGNOSIS — K21.00 GASTROESOPHAGEAL REFLUX DISEASE WITH ESOPHAGITIS WITHOUT HEMORRHAGE: ICD-10-CM

## 2022-12-12 DIAGNOSIS — I10 PRIMARY HYPERTENSION: Primary | ICD-10-CM

## 2022-12-12 DIAGNOSIS — E55.9 VITAMIN D DEFICIENCY: ICD-10-CM

## 2022-12-12 DIAGNOSIS — E53.8 LOW SERUM VITAMIN B12: ICD-10-CM

## 2022-12-12 DIAGNOSIS — R09.89 GLOBUS SENSATION: ICD-10-CM

## 2022-12-12 DIAGNOSIS — F41.1 GENERALIZED ANXIETY DISORDER: ICD-10-CM

## 2022-12-12 PROCEDURE — 99214 OFFICE O/P EST MOD 30 MIN: CPT | Performed by: PHYSICIAN ASSISTANT

## 2022-12-12 RX ORDER — CARVEDILOL 12.5 MG/1
12.5 TABLET ORAL 2 TIMES DAILY WITH MEALS
Qty: 180 TABLET | Refills: 1 | Status: SHIPPED | OUTPATIENT
Start: 2022-12-12

## 2022-12-12 RX ORDER — ESOMEPRAZOLE MAGNESIUM 40 MG/1
40 CAPSULE, DELAYED RELEASE ORAL DAILY
Qty: 90 CAPSULE | Refills: 3 | Status: SHIPPED | OUTPATIENT
Start: 2022-12-12

## 2022-12-12 RX ORDER — AMLODIPINE BESYLATE 2.5 MG/1
2.5 TABLET ORAL DAILY
Qty: 90 TABLET | Refills: 1 | Status: SHIPPED | OUTPATIENT
Start: 2022-12-12 | End: 2022-12-30 | Stop reason: SDUPTHER

## 2022-12-12 NOTE — PROGRESS NOTES
"Subjective   Usman Burrell is a 42 y.o. male.     History of Present Illness    Since the last visit, he has overall felt well.  He has Primary Hypertension and well controlled on current medication, Vitamin D deficiency and will update labs for continued management and GERD is improved on PPI but not controlled and referring to ENT.  he has been compliant with current medications have reviewed them.  The patient denies medication side effects.  Will refill medications. /60 (BP Location: Left arm, Patient Position: Sitting, Cuff Size: Adult)   Pulse 66   Temp 97.5 °F (36.4 °C) (Oral)   Resp 16   Ht 68 cm (26.77\")   Wt 111 kg (245 lb 3.2 oz)   SpO2 95%   .53 kg/m²   Still some dysphagia noted  Taking B12 and folic acid over-the-counter and vitamin D  Kevin follow-up also for blood pressure noting he had angioedema with ARB and continues on the carvedilol and added amlodipine to replace the ARB last visit and I am very satisfied with blood pressure control  Results for orders placed or performed in visit on 03/09/22   Comprehensive metabolic panel    Specimen: Blood   Result Value Ref Range    Glucose 84 65 - 99 mg/dL    BUN 18 6 - 24 mg/dL    Creatinine 1.11 0.76 - 1.27 mg/dL    EGFR Result 84 >59 mL/min/1.73    BUN/Creatinine Ratio 16 9 - 20    Sodium 142 134 - 144 mmol/L    Potassium 4.1 3.5 - 5.2 mmol/L    Chloride 103 96 - 106 mmol/L    Total CO2 24 20 - 29 mmol/L    Calcium 9.5 8.7 - 10.2 mg/dL    Total Protein 7.1 6.0 - 8.5 g/dL    Albumin 4.7 4.0 - 5.0 g/dL    Globulin 2.4 1.5 - 4.5 g/dL    A/G Ratio 2.0 1.2 - 2.2    Total Bilirubin 0.6 0.0 - 1.2 mg/dL    Alkaline Phosphatase 71 44 - 121 IU/L    AST (SGOT) 24 0 - 40 IU/L    ALT (SGPT) 47 (H) 0 - 44 IU/L   Lipid panel    Specimen: Blood   Result Value Ref Range    Total Cholesterol 167 100 - 199 mg/dL    Triglycerides 108 0 - 149 mg/dL    HDL Cholesterol 54 >39 mg/dL    VLDL Cholesterol Jewel 19 5 - 40 mg/dL    LDL Chol Calc (NIH) 94 0 - 99 " mg/dL   TSH    Specimen: Blood   Result Value Ref Range    TSH 2.590 0.450 - 4.500 uIU/mL   T3, Free    Specimen: Blood   Result Value Ref Range    T3, Free 3.4 2.0 - 4.4 pg/mL   T4, Free    Specimen: Blood   Result Value Ref Range    Free T4 1.37 0.82 - 1.77 ng/dL   Vitamin B12    Specimen: Blood   Result Value Ref Range    Vitamin B-12 883 232 - 1,245 pg/mL   Folate    Specimen: Blood   Result Value Ref Range    Folate 9.2 >3.0 ng/mL   Vitamin D 25 Hydroxy    Specimen: Blood   Result Value Ref Range    25 Hydroxy, Vitamin D 37.7 30.0 - 100.0 ng/mL   Magnesium    Specimen: Blood   Result Value Ref Range    Magnesium 2.2 1.6 - 2.3 mg/dL   Microscopic Examination -   Result Value Ref Range    WBC, UA None seen 0 - 5 /hpf    RBC, UA None seen 0 - 2 /hpf    Epithelial Cells (non renal) None seen 0 - 10 /hpf    Casts None seen None seen /lpf    Bacteria, UA None seen None seen/Few   Hepatitis Panel, Acute   Result Value Ref Range    Hep A IgM Negative Negative    Hepatitis B Surface Ag Negative Negative    Hep B Core IgM Negative Negative    Hep C Virus Ab 0.1 0.0 - 0.9 s/co ratio   Please Note   Result Value Ref Range    Please note Comment    Written Authorization   Result Value Ref Range    Written Authorization Comment    CBC and Differential    Specimen: Blood   Result Value Ref Range    WBC 4.6 3.4 - 10.8 x10E3/uL    RBC 5.32 4.14 - 5.80 x10E6/uL    Hemoglobin 16.6 13.0 - 17.7 g/dL    Hematocrit 49.2 37.5 - 51.0 %    MCV 93 79 - 97 fL    MCH 31.2 26.6 - 33.0 pg    MCHC 33.7 31.5 - 35.7 g/dL    RDW 12.8 11.6 - 15.4 %    Platelets 218 150 - 450 x10E3/uL    Neutrophil Rel % 56 Not Estab. %    Lymphocyte Rel % 24 Not Estab. %    Monocyte Rel % 14 Not Estab. %    Eosinophil Rel % 3 Not Estab. %    Basophil Rel % 2 Not Estab. %    Neutrophils Absolute 2.6 1.4 - 7.0 x10E3/uL    Lymphocytes Absolute 1.1 0.7 - 3.1 x10E3/uL    Monocytes Absolute 0.7 0.1 - 0.9 x10E3/uL    Eosinophils Absolute 0.1 0.0 - 0.4 x10E3/uL     Basophils Absolute 0.1 0.0 - 0.2 x10E3/uL    Immature Granulocyte Rel % 1 Not Estab. %    Immature Grans Absolute 0.0 0.0 - 0.1 x10E3/uL   Urinalysis With Microscopic - Urine, Clean Catch    Specimen: Urine, Clean Catch   Result Value Ref Range    Specific Gravity, UA 1.022 1.005 - 1.030    pH, UA 7.0 5.0 - 7.5    Color, UA Yellow Yellow    Appearance, UA Clear Clear    Leukocytes, UA Negative Negative    Protein Negative Negative/Trace    Glucose, UA Negative Negative    Ketones Negative Negative    Blood, UA Negative Negative    Bilirubin, UA Negative Negative    Urobilinogen, UA 0.2 0.2 - 1.0 mg/dL    Nitrite, UA Negative Negative    Microscopic Examination Comment     Microscopic Examination See below:        Seeing dentist    Did treat sinus infx d/t CT    I noted that patient had started carvedilol from the weight loss clinic his blood pressure was slightly elevated... And added lisinopril last visit due to blood pressure not controlled and will not prescribe ACE or ARB in future due to concern of angioedema and already changed the ACE to amlodipine  He still having daily anxiety and panic attacks and the hydroxyzine is not effective.  Has been on Lexapro in past stopped taking due to fatigue and weight gain switch to Zoloft for few months and did help..  Still noted weight gain..    GERD better off Mounjaro-------still some dyshaghia    He still having daily anxiety and panic attacks and the hydroxyzine is not effective.  Has been on Lexapro in past stopped taking due to fatigue and weight gain switch to Zoloft for few months and did help---restarted Zoloft--helping anxiety.  Started lorazepam as needed panic attacks last visit on 11/10/2022 has taken 2 doses and was very effective.  Also noting less overall anxiety since stopping the Wellbutrin--= discussed this last visit as well  The following portions of the patient's history were reviewed and updated as appropriate: allergies, current medications, past  family history, past medical history, past social history, past surgical history and problem list.    Review of Systems   Constitutional: Negative for fever.   HENT: Negative for nosebleeds and trouble swallowing.    Eyes: Negative for visual disturbance.   Respiratory: Negative for choking and stridor.    Cardiovascular: Negative for chest pain.   Gastrointestinal: Negative for blood in stool.   Endocrine: Negative for polydipsia.   Genitourinary: Negative for genital sores and hematuria.   Musculoskeletal: Negative for joint swelling.   Skin: Negative for color change and rash.   Allergic/Immunologic: Negative for immunocompromised state.   Neurological: Negative for seizures, facial asymmetry and speech difficulty.   Hematological: Negative for adenopathy.   Psychiatric/Behavioral: Negative for behavioral problems, self-injury and suicidal ideas. The patient is not nervous/anxious.        Objective   Physical Exam  Vitals and nursing note reviewed.   Constitutional:       General: He is not in acute distress.     Appearance: He is well-developed. He is obese. He is not diaphoretic.   HENT:      Head: Normocephalic.   Eyes:      Conjunctiva/sclera: Conjunctivae normal.      Pupils: Pupils are equal, round, and reactive to light.   Neck:      Vascular: No carotid bruit.   Cardiovascular:      Rate and Rhythm: Normal rate and regular rhythm.      Pulses: Normal pulses.      Heart sounds: Normal heart sounds. No murmur heard.  Pulmonary:      Effort: Pulmonary effort is normal.      Breath sounds: Normal breath sounds. No rales.   Musculoskeletal:         General: Normal range of motion.      Cervical back: Normal range of motion and neck supple.   Skin:     General: Skin is warm and dry.      Findings: No rash.   Neurological:      Mental Status: He is alert and oriented to person, place, and time.   Psychiatric:         Mood and Affect: Mood normal. Affect is not inappropriate.         Behavior: Behavior normal.          Thought Content: Thought content normal.         Judgment: Judgment normal.         Assessment & Plan   Diagnoses and all orders for this visit:    1. Primary hypertension (Primary)  Comments:  med controlled  Orders:  -     Comprehensive metabolic panel; Future  -     Lipid panel; Future  -     CBC and Differential; Future  -     TSH; Future  -     Hemoglobin A1c; Future  -     Vitamin D,25-Hydroxy; Future  -     Vitamin B12; Future  -     Folate; Future  -     Urinalysis With Microscopic - Urine, Clean Catch; Future  -     Magnesium; Future    2. Gastroesophageal reflux disease with esophagitis without hemorrhage  Comments:  see ENT  Orders:  -     Comprehensive metabolic panel; Future  -     Lipid panel; Future  -     CBC and Differential; Future  -     TSH; Future  -     Hemoglobin A1c; Future  -     Vitamin D,25-Hydroxy; Future  -     Vitamin B12; Future  -     Folate; Future  -     Urinalysis With Microscopic - Urine, Clean Catch; Future  -     Magnesium; Future    3. Vitamin D deficiency  -     Comprehensive metabolic panel; Future  -     Lipid panel; Future  -     CBC and Differential; Future  -     TSH; Future  -     Hemoglobin A1c; Future  -     Vitamin D,25-Hydroxy; Future  -     Vitamin B12; Future  -     Folate; Future  -     Urinalysis With Microscopic - Urine, Clean Catch; Future  -     Magnesium; Future    4. Globus sensation  Comments:  see ENT  Orders:  -     Comprehensive metabolic panel; Future  -     Lipid panel; Future  -     CBC and Differential; Future  -     TSH; Future  -     Hemoglobin A1c; Future  -     Vitamin D,25-Hydroxy; Future  -     Vitamin B12; Future  -     Folate; Future  -     Urinalysis With Microscopic - Urine, Clean Catch; Future  -     Magnesium; Future    5. Low serum vitamin B12  Comments:  Continue over-the-counter with labs in March  Orders:  -     Comprehensive metabolic panel; Future  -     Lipid panel; Future  -     CBC and Differential; Future  -     TSH;  Future  -     Hemoglobin A1c; Future  -     Vitamin D,25-Hydroxy; Future  -     Vitamin B12; Future  -     Folate; Future  -     Urinalysis With Microscopic - Urine, Clean Catch; Future  -     Magnesium; Future    6. Low folic acid  Comments:  Continue over-the-counter with labs in March  Orders:  -     Comprehensive metabolic panel; Future  -     Lipid panel; Future  -     CBC and Differential; Future  -     TSH; Future  -     Hemoglobin A1c; Future  -     Vitamin D,25-Hydroxy; Future  -     Vitamin B12; Future  -     Folate; Future  -     Urinalysis With Microscopic - Urine, Clean Catch; Future  -     Magnesium; Future    7. Generalized anxiety disorder  Comments:  Continue Zoloft working well in as needed Ativan for panic working well  Orders:  -     Comprehensive metabolic panel; Future  -     Lipid panel; Future  -     CBC and Differential; Future  -     TSH; Future  -     Hemoglobin A1c; Future  -     Vitamin D,25-Hydroxy; Future  -     Vitamin B12; Future  -     Folate; Future  -     Urinalysis With Microscopic - Urine, Clean Catch; Future  -     Magnesium; Future    8. LFT elevation  Comments:  Repeat labs March  Orders:  -     Comprehensive metabolic panel; Future  -     Lipid panel; Future  -     CBC and Differential; Future  -     TSH; Future  -     Hemoglobin A1c; Future  -     Vitamin D,25-Hydroxy; Future  -     Vitamin B12; Future  -     Folate; Future  -     Urinalysis With Microscopic - Urine, Clean Catch; Future  -     Magnesium; Future    Other orders  -     sertraline (Zoloft) 50 MG tablet; one PO daily for stress  Dispense: 90 tablet; Refill: 3  -     esomeprazole (nexIUM) 40 MG capsule; Take 1 capsule by mouth Daily. for GERD  Dispense: 90 capsule; Refill: 3    Plan, Usman Burrell, was seen today.  he was seen for HTN and continue medication, GERD and will continue on PPI medication and Vitamin D deficiency and will update labs .  Also follow-up on LFTs with March labs  See ENT about  GERD--dysphagia---and chronic sinus drg---has sinus cyst--- note his insurance would not pay for twice daily Nexium he is taking the prescribed Nexium and over-the-counter Nexium and does help but still has dysphagia  Continue Zoloft for anxiety working well at 50 mg MS keep lorazepam as needed for panic attacks noting failure of hydroxyzine prior  Continue folic acid B12 over-the-counter plan labs in March         Answers for HPI/ROS submitted by the patient on 12/12/2022  What is the primary reason for your visit?: Other  Please describe your symptoms.: Follow up  Have you had these symptoms before?: Yes  How long have you been having these symptoms?: Greater than 2 weeks

## 2022-12-31 RX ORDER — AMLODIPINE BESYLATE 2.5 MG/1
2.5 TABLET ORAL DAILY
Qty: 90 TABLET | Refills: 1 | Status: SHIPPED | OUTPATIENT
Start: 2022-12-31

## 2023-05-22 ENCOUNTER — OFFICE VISIT (OUTPATIENT)
Dept: FAMILY MEDICINE CLINIC | Facility: CLINIC | Age: 43
End: 2023-05-22
Payer: COMMERCIAL

## 2023-05-22 VITALS
HEIGHT: 60 IN | DIASTOLIC BLOOD PRESSURE: 86 MMHG | HEART RATE: 93 BPM | BODY MASS INDEX: 49.67 KG/M2 | SYSTOLIC BLOOD PRESSURE: 118 MMHG | RESPIRATION RATE: 16 BRPM | OXYGEN SATURATION: 96 % | WEIGHT: 253 LBS | TEMPERATURE: 98 F

## 2023-05-22 DIAGNOSIS — J06.9 VIRAL UPPER RESPIRATORY TRACT INFECTION: Primary | ICD-10-CM

## 2023-05-22 LAB
EXPIRATION DATE: NORMAL
INTERNAL CONTROL: NORMAL
Lab: NORMAL
SARS-COV-2 AG UPPER RESP QL IA.RAPID: NOT DETECTED

## 2023-05-22 PROCEDURE — 87426 SARSCOV CORONAVIRUS AG IA: CPT | Performed by: FAMILY MEDICINE

## 2023-05-22 PROCEDURE — 99214 OFFICE O/P EST MOD 30 MIN: CPT | Performed by: FAMILY MEDICINE

## 2023-05-22 RX ORDER — GUAIFENESIN AND CODEINE PHOSPHATE 100; 10 MG/5ML; MG/5ML
10 SOLUTION ORAL 4 TIMES DAILY PRN
Qty: 240 ML | Refills: 0 | Status: SHIPPED | OUTPATIENT
Start: 2023-05-22 | End: 2023-05-24 | Stop reason: SDUPTHER

## 2023-05-22 RX ORDER — DOXYCYCLINE HYCLATE 100 MG
100 TABLET ORAL 2 TIMES DAILY
Qty: 20 TABLET | Refills: 0 | Status: SHIPPED | OUTPATIENT
Start: 2023-05-22 | End: 2023-06-01

## 2023-05-22 NOTE — LETTER
May 22, 2023     Patient: Usman Burrell   YOB: 1980   Date of Visit: 5/22/2023       To Whom It May Concern:    It is my medical opinion that Usman Burrell may return to work on Wednesday.  Work absence is due to acute illness.       Sincerely,        Eliazar Myles MD    CC: No Recipients

## 2023-05-22 NOTE — PROGRESS NOTES
"Chief Complaint  Cough (Went to [facility] on Thursday, said it was viral, neg Covid and Flu/C/o productive cough, wheezing, bodyaches )    Subjective          Usman presents to [facility] for cough that started last Thursday.  Cough is productive of dark green mucus.  He has since been seen in the urgent care and flu was ruled out.  Today's COVID test was negative.  Occasional sweats.  Has had 100.1 temperatures at home.  Over-the-counter treatment is not effective.          Objective   Vital Signs:   Vitals:    05/22/23 1031   BP: 118/86   Pulse: 93   Resp: 16   Temp: 98 °F (36.7 °C)   SpO2: 96%   Weight: 115 kg (253 lb)   Height: 68 cm (26.77\")                Physical Exam  Vitals reviewed.   Constitutional:       Appearance: He is ill-appearing (mild).   HENT:      Right Ear: Tympanic membrane normal.      Left Ear: Tympanic membrane normal.      Nose: Congestion present.      Mouth/Throat:      Pharynx: Posterior oropharyngeal erythema present.   Cardiovascular:      Rate and Rhythm: Normal rate and regular rhythm.   Pulmonary:      Effort: Pulmonary effort is normal. No respiratory distress.      Breath sounds: Normal breath sounds.   Lymphadenopathy:      Cervical: No cervical adenopathy.   Skin:     General: Skin is warm and dry.   Neurological:      Mental Status: He is alert.   Psychiatric:         Mood and Affect: Mood normal.          Result Review :     The following data was reviewed by: Eliazar Myles MD on 05/22/2023:  COVID-19 test negative         Assessment and Plan    Diagnoses and all orders for this visit:    1. Viral upper respiratory tract infection (Primary)  Comments:  Suspect secondary bacterial infection.  Treat with doxycycline twice daily x10 days.  Advised to wear sunscreen and avoid dairy products  Orders:  -     POCT SARS-CoV-2 Antigen AMANDA  -     doxycycline (VIBRAMYICN) 100 MG tablet; Take 1 tablet by mouth 2 (Two) Times a Day for 10 days. No dairy " products within 2 hours of a dose  Dispense: 20 tablet; Refill: 0  -     guaiFENesin-codeine (GUAIFENESIN AC) 100-10 MG/5ML liquid; Take 10 mL by mouth 4 (Four) Times a Day As Needed for Cough for up to 10 days.  Dispense: 240 mL; Refill: 0        Follow Up   Return if symptoms worsen or fail to improve.  Patient was given instructions and counseling regarding his condition or for health maintenance advice. Please see specific information pulled into the AVS if appropriate.

## 2023-05-23 DIAGNOSIS — J06.9 VIRAL UPPER RESPIRATORY TRACT INFECTION: ICD-10-CM

## 2023-05-23 RX ORDER — ESOMEPRAZOLE MAGNESIUM 40 MG/1
40 CAPSULE, DELAYED RELEASE ORAL DAILY
Qty: 90 CAPSULE | Refills: 3 | Status: SHIPPED | OUTPATIENT
Start: 2023-05-23

## 2023-05-23 RX ORDER — GUAIFENESIN AND CODEINE PHOSPHATE 100; 10 MG/5ML; MG/5ML
10 SOLUTION ORAL 4 TIMES DAILY PRN
Qty: 240 ML | Refills: 0 | OUTPATIENT
Start: 2023-05-23 | End: 2023-06-02

## 2023-05-23 NOTE — TELEPHONE ENCOUNTER
Requested Prescriptions     Pending Prescriptions Disp Refills   • esomeprazole (nexIUM) 40 MG capsule 90 capsule 3     Sig: Take 1 capsule by mouth Daily. for GERD   • sertraline (Zoloft) 50 MG tablet 90 tablet 0     Sig: one PO daily for stress

## 2023-05-23 NOTE — TELEPHONE ENCOUNTER
Caller: Usman Burrell    Relationship to patient: Self    Best call back number: [phone]    Patient is needing: THE PATIENT CALLED BACK IN FOR AN UPDATE ON THIS MEDICATION REQUEST. THE PATIENT STATES THAT HE DID NOT GET MUCH SLEEP LAST NIGHT DUE TO HIS COUGH. PLEASE ADVISE.

## 2023-05-23 NOTE — TELEPHONE ENCOUNTER
Caller: Usman Burrell    Relationship: Self    Best call back number: [phone]    Requested Prescriptions:   Requested Prescriptions     Pending Prescriptions Disp Refills   • guaiFENesin-codeine (GUAIFENESIN AC) 100-10 MG/5ML liquid 240 mL 0     Sig: Take 10 mL by mouth 4 (Four) Times a Day As Needed for Cough for up to 10 days.        Pharm[id number] [address] [facility] [facility] [phone] [facility] [phone]      Last office visit with prescribing clinician: 12/12/2022   Last telemedicine visit with prescribing clinician: Visit date not found   Next office visit with prescribing clinician: 6/12/2023     Additional details provided by patient:[facility] DID NOT HAVE THIS COUGH SYRUP IN STOCK. THE PATIENT WOULD LIKE FOR THIS MEDICATION TO BE TRANSFERRED TO THE [facility] LISTED ABOVE. PLEASE ADVISE.     Does the patient have less than a 3 day supply:  [x] Yes  [] No    Would you like a call back once the refill request has been completed: [x] Yes [] No    If the office needs to give you a call back, can they leave a voicemail: [x] Yes [] No    Deo Nina Rep   05/23/23 11:00 EDT

## 2023-05-24 ENCOUNTER — PRIOR AUTHORIZATION (OUTPATIENT)
Dept: FAMILY MEDICINE CLINIC | Facility: CLINIC | Age: 43
End: 2023-05-24
Payer: COMMERCIAL

## 2023-05-24 RX ORDER — GUAIFENESIN AND CODEINE PHOSPHATE 100; 10 MG/5ML; MG/5ML
10 SOLUTION ORAL 4 TIMES DAILY PRN
Qty: 240 ML | Refills: 0 | Status: SHIPPED | OUTPATIENT
Start: 2023-05-24 | End: 2023-06-03

## 2023-06-12 ENCOUNTER — OFFICE VISIT (OUTPATIENT)
Dept: FAMILY MEDICINE CLINIC | Facility: CLINIC | Age: 43
End: 2023-06-12
Payer: COMMERCIAL

## 2023-06-12 VITALS
WEIGHT: 255 LBS | RESPIRATION RATE: 16 BRPM | SYSTOLIC BLOOD PRESSURE: 128 MMHG | DIASTOLIC BLOOD PRESSURE: 88 MMHG | BODY MASS INDEX: 50.06 KG/M2 | HEIGHT: 60 IN | TEMPERATURE: 97.2 F | OXYGEN SATURATION: 96 % | HEART RATE: 71 BPM

## 2023-06-12 DIAGNOSIS — E53.8 LOW SERUM VITAMIN B12: ICD-10-CM

## 2023-06-12 DIAGNOSIS — R73.01 IMPAIRED FASTING GLUCOSE: ICD-10-CM

## 2023-06-12 DIAGNOSIS — K21.9 GASTROESOPHAGEAL REFLUX DISEASE WITHOUT ESOPHAGITIS: ICD-10-CM

## 2023-06-12 DIAGNOSIS — E55.9 VITAMIN D DEFICIENCY: ICD-10-CM

## 2023-06-12 DIAGNOSIS — F41.1 GENERALIZED ANXIETY DISORDER: ICD-10-CM

## 2023-06-12 DIAGNOSIS — E53.8 LOW FOLIC ACID: ICD-10-CM

## 2023-06-12 DIAGNOSIS — Z12.11 SCREEN FOR COLON CANCER: ICD-10-CM

## 2023-06-12 DIAGNOSIS — R79.89 LFT ELEVATION: ICD-10-CM

## 2023-06-12 DIAGNOSIS — I10 PRIMARY HYPERTENSION: Primary | ICD-10-CM

## 2023-06-12 RX ORDER — AMLODIPINE BESYLATE 2.5 MG/1
2.5 TABLET ORAL DAILY
Qty: 90 TABLET | Refills: 1 | Status: SHIPPED | OUTPATIENT
Start: 2023-06-12

## 2023-06-12 RX ORDER — CARVEDILOL 12.5 MG/1
12.5 TABLET ORAL 2 TIMES DAILY WITH MEALS
Qty: 180 TABLET | Refills: 1 | Status: SHIPPED | OUTPATIENT
Start: 2023-06-12

## 2023-06-12 NOTE — PROGRESS NOTES
"Subjective   Usman Burrell is a 43 y.o. male.     History of Present Illness    Since the last visit, he has overall felt well.  He has Primary Hypertension and well controlled on current medication, Impaired fasting glucose and will monitor labs to watch for DMII, and Vitamin D deficiency and labs are at goal >30 ng/mL.  he has been compliant with current medications have reviewed them.  The patient denies medication side effects.  Will refill medications. /88   Pulse 71   Temp 97.2 °F (36.2 °C)   Resp 16   Ht 68 cm (26.77\")   Wt 116 kg (255 lb)   SpO2 96%   .18 kg/m²     Lab Results   Component Value Date    GLUCOSE 94 06/08/2023    BUN 15 06/08/2023    CREATININE 1.05 06/08/2023    EGFRRESULT 89 06/08/2023    BCR 14 06/08/2023    K 4.6 06/08/2023    CO2 24 06/08/2023    CALCIUM 9.4 06/08/2023    PROTENTOTREF 6.9 06/08/2023    ALBUMIN 4.5 06/08/2023    BILITOT 0.7 06/08/2023    AST 21 06/08/2023    ALT 35 06/08/2023     Lab Results   Component Value Date    HGBA1C 5.4 06/08/2023     Lab Results   Component Value Date    CHLPL 142 06/08/2023    TRIG 74 06/08/2023    HDL 50 06/08/2023    LDL 77 06/08/2023     Lab Results   Component Value Date    TSH 1.630 06/08/2023   Note he just had labs on 6/8/2023 and his ALT liver enzyme had been 47, elevated and now in range at 35.  The 10-year ASCVD risk score (Tr OVIEDO, et al., 2019) is: 1.3%    Values used to calculate the score:      Age: 43 years      Sex: Male      Is Non- : No      Diabetic: No      Tobacco smoker: No      Systolic Blood Pressure: 128 mmHg      Is BP treated: Yes      HDL Cholesterol: 50 mg/dL      Total Cholesterol: 142 mg/dL  Walking more  Had visit with Dr. Myles for URI 5/22/2023 prescribed doxycycline in guaifenesin codeine cough syrup--resolved.  Required prior authorization in past to continue his Nexium 40 mg Rx.--Failed omeprazole, Prevacid, Protonix.  Last visit with me was 12/12/2022 noting his " hypertension was well controlled.  Noting he had angioedema with ARB.  Also noting he had significant GERD with Mounjaro  Have been working on his elevated LFTs for treatment of fatty liver and his impaired glucose with getting his weight down.  Noted last visit  Still doing well on dose Zoloft.  Took Ativan once---worked for panic attack.  He still having daily anxiety and panic attacks and the hydroxyzine is not effective.  Has been on Lexapro in past stopped taking due to fatigue and weight gain switch to Zoloft for few months and did help---restarted Zoloft--helping anxiety.  Started lorazepam as needed panic attacks last visit on 11/10/2022 has taken 2 doses and was very effective.  Also noting less overall anxiety since stopping the Wellbutrin--= discussed this last visit as well.  Noted at last visit continued same dose of Zoloft 50 mg reporting working well and Lorazepam for panic attacks noting failure of hydroxyzine prior.  Had work-up on 2021 with Dr. [facility] cardiologist for evaluation of atypical chest pain noting his dad  of heart disease.  1. Chest discomfort/shortness of breath/palpitations. Patient is doing significantly better since he was treated with antianxiety medications. His ECG is unremarkable. He has not had any symptoms now in 6 weeks and says he feels good. He has come to  with his father's passing and I believe this is the etiology for his issues. Since is asymptomatic his EKG is unremarkable I just offered reassurance at this point. I told her if he starts having more issues or any concerns to contact me and I would set him up for stress test as an outpatient. Otherwise see him on an as-needed basis.  Also noted his EKG was normal at this visit.  Bp has been at goal.  Walking daily for exercise    The following portions of the patient's history were reviewed and updated as appropriate: allergies, current medications, past family history, past medical history, past social  history, past surgical history, and problem list.    Review of Systems   Constitutional:  Negative for diaphoresis.   HENT:  Negative for nosebleeds and trouble swallowing.    Eyes:  Negative for blurred vision and visual disturbance.   Respiratory:  Negative for choking.    Gastrointestinal:  Negative for blood in stool.   Allergic/Immunologic: Negative for immunocompromised state.   Neurological:  Negative for facial asymmetry and speech difficulty.   Psychiatric/Behavioral:  Negative for self-injury and suicidal ideas.      Objective   Physical Exam  Vitals and nursing note reviewed.   Constitutional:       General: He is not in acute distress.     Appearance: He is well-developed. He is obese. He is not diaphoretic.   HENT:      Head: Normocephalic.   Eyes:      General: No scleral icterus.     Conjunctiva/sclera: Conjunctivae normal.      Pupils: Pupils are equal, round, and reactive to light.   Neck:      Vascular: No carotid bruit.   Cardiovascular:      Rate and Rhythm: Normal rate and regular rhythm.      Heart sounds: Normal heart sounds. No murmur heard.     Comments: Varicose veins right lower leg  Pulmonary:      Effort: Pulmonary effort is normal.      Breath sounds: Normal breath sounds.   Musculoskeletal:         General: Normal range of motion.      Cervical back: Normal range of motion and neck supple.      Right lower leg: No edema.      Left lower leg: No edema.   Skin:     General: Skin is warm and dry.      Findings: No rash.   Neurological:      Mental Status: He is alert and oriented to person, place, and time.   Psychiatric:         Mood and Affect: Mood normal. Affect is not inappropriate.         Behavior: Behavior normal.         Thought Content: Thought content normal.         Judgment: Judgment normal.         Assessment & Plan   Diagnoses and all orders for this visit:    1. Primary hypertension (Primary)  -     Comprehensive metabolic panel; Future  -     Lipid panel; Future  -      Hemoglobin A1c; Future    2. Low folic acid  -     Comprehensive metabolic panel; Future  -     Lipid panel; Future  -     Hemoglobin A1c; Future    3. Low serum vitamin B12    4. Vitamin D deficiency    5. Gastroesophageal reflux disease without esophagitis    6. Generalized anxiety disorder    7. Screen for colon cancer  -     Cologuard - Stool, Per Rectum; Future    8. LFT elevation  -     Comprehensive metabolic panel; Future  -     Lipid panel; Future  -     Hemoglobin A1c; Future    9. Impaired fasting glucose  -     Comprehensive metabolic panel; Future  -     Lipid panel; Future  -     Hemoglobin A1c; Future    Other orders  -     sertraline (Zoloft) 50 MG tablet; one PO daily for stress  Dispense: 90 tablet; Refill: 3        Following up today on anxiety with continuing sertraline working well and lorazepam as needed panic attack note failure of hydroxyzine  Continue to take Nexium for GERD working well  Watching liver enzymes due to fatty liver disease --great job at getting ALT back down --and he is working aggressively on keeping his weight down--- was intolerant to GLP-1 agonist  Plan, Usman Burrell, was seen today.  he was seen for HTN and continue medication, Imparied fasting glucose and plan follow up labs, diet, and exercise, and Vitamin D deficiency and supplemented.  Continue same regimen for folic acid and B12 with labs at goal on 6/8/2023 labs  Consider calcium cardiac CT scan due to dad heart disease  He has not taken his blood pressure medicine yet today blood pressure today is 128/88 do want him to check readings at home and message me to make sure his diastolic is staying below 85  Note B12 and folic acid were normal range and he is off supplement

## 2023-12-06 ENCOUNTER — PATIENT MESSAGE (OUTPATIENT)
Dept: FAMILY MEDICINE CLINIC | Facility: CLINIC | Age: 43
End: 2023-12-06
Payer: COMMERCIAL

## 2023-12-06 DIAGNOSIS — I10 BENIGN ESSENTIAL HTN: ICD-10-CM

## 2023-12-06 DIAGNOSIS — R73.01 IMPAIRED FASTING GLUCOSE: Primary | ICD-10-CM

## 2023-12-08 NOTE — TELEPHONE ENCOUNTER
From: Usman Burrell  Sent: 12/7/2023 2:34 PM EST  To: [facility]  Subject: Heart test    What do I need to do to get this going

## 2023-12-23 RX ORDER — CARVEDILOL 12.5 MG/1
12.5 TABLET ORAL 2 TIMES DAILY WITH MEALS
Qty: 180 TABLET | Refills: 1 | Status: SHIPPED | OUTPATIENT
Start: 2023-12-23

## 2024-01-13 ENCOUNTER — HOSPITAL ENCOUNTER (OUTPATIENT)
Dept: CT IMAGING | Facility: HOSPITAL | Age: 44
Discharge: HOME | End: 2024-01-13
Admitting: PHYSICIAN ASSISTANT

## 2024-01-13 PROCEDURE — 75571 CT HRT W/O DYE W/CA TEST: CPT

## 2024-01-14 RX ORDER — AMLODIPINE BESYLATE 2.5 MG/1
2.5 TABLET ORAL DAILY
Qty: 30 TABLET | Refills: 0 | Status: SHIPPED | OUTPATIENT
Start: 2024-01-14

## 2024-01-15 ENCOUNTER — PATIENT MESSAGE (OUTPATIENT)
Dept: FAMILY MEDICINE CLINIC | Facility: CLINIC | Age: 44
End: 2024-01-15
Payer: COMMERCIAL

## 2024-01-15 DIAGNOSIS — R79.89 LFT ELEVATION: Primary | ICD-10-CM

## 2024-01-15 DIAGNOSIS — R16.0 HEPATOMEGALY: ICD-10-CM

## 2024-01-15 NOTE — TELEPHONE ENCOUNTER
From: Usman Burrell  To: Esther Patel  Sent: 1/15/2024 11:11 AM EST  Subject: Gabriel tello    I called to make appt they said I need a Doctor's referral said there fax is [phone]. Thanks.     What can cause enlarged liver?

## 2024-01-22 ENCOUNTER — OFFICE VISIT (OUTPATIENT)
Dept: GASTROENTEROLOGY | Facility: CLINIC | Age: 44
End: 2024-01-22
Payer: COMMERCIAL

## 2024-01-22 VITALS
SYSTOLIC BLOOD PRESSURE: 133 MMHG | DIASTOLIC BLOOD PRESSURE: 92 MMHG | OXYGEN SATURATION: 94 % | BODY MASS INDEX: 39.4 KG/M2 | HEART RATE: 88 BPM | TEMPERATURE: 96.1 F | WEIGHT: 260 LBS | HEIGHT: 68 IN

## 2024-01-22 DIAGNOSIS — K76.0 FATTY LIVER: ICD-10-CM

## 2024-01-22 DIAGNOSIS — K21.00 GASTROESOPHAGEAL REFLUX DISEASE WITH ESOPHAGITIS WITHOUT HEMORRHAGE: ICD-10-CM

## 2024-01-22 DIAGNOSIS — R16.0 HEPATOMEGALY: Primary | ICD-10-CM

## 2024-01-22 LAB
ALBUMIN SERPL-MCNC: 5 G/DL (ref 3.5–5.2)
ALBUMIN/GLOB SERPL: 2 G/DL
ALP SERPL-CCNC: 80 U/L (ref 39–117)
ALT SERPL-CCNC: 37 U/L (ref 1–41)
AST SERPL-CCNC: 13 U/L (ref 1–40)
BILIRUB SERPL-MCNC: 0.5 MG/DL (ref 0–1.2)
BUN SERPL-MCNC: 19 MG/DL (ref 6–20)
BUN/CREAT SERPL: 15.7 (ref 7–25)
CALCIUM SERPL-MCNC: 10 MG/DL (ref 8.6–10.5)
CHLORIDE SERPL-SCNC: 104 MMOL/L (ref 98–107)
CO2 SERPL-SCNC: 26.1 MMOL/L (ref 22–29)
CREAT SERPL-MCNC: 1.21 MG/DL (ref 0.76–1.27)
EGFRCR SERPLBLD CKD-EPI 2021: 75.2 ML/MIN/1.73
GLOBULIN SER CALC-MCNC: 2.5 GM/DL
GLUCOSE SERPL-MCNC: 116 MG/DL (ref 65–99)
POTASSIUM SERPL-SCNC: 4.5 MMOL/L (ref 3.5–5.2)
PROT SERPL-MCNC: 7.5 G/DL (ref 6–8.5)
SODIUM SERPL-SCNC: 142 MMOL/L (ref 136–145)

## 2024-01-22 PROCEDURE — 99204 OFFICE O/P NEW MOD 45 MIN: CPT | Performed by: PHYSICIAN ASSISTANT

## 2024-01-22 RX ORDER — ALBUTEROL SULFATE 90 UG/1
AEROSOL, METERED RESPIRATORY (INHALATION)
COMMUNITY
Start: 2023-09-22

## 2024-01-22 RX ORDER — PANTOPRAZOLE SODIUM 40 MG/1
40 TABLET, DELAYED RELEASE ORAL EVERY EVENING
Qty: 90 TABLET | Refills: 1 | Status: SHIPPED | OUTPATIENT
Start: 2024-01-22

## 2024-01-22 RX ORDER — AZITHROMYCIN 250 MG/1
250 TABLET, FILM COATED ORAL
COMMUNITY
Start: 2023-09-22 | End: 2024-01-22

## 2024-01-22 RX ORDER — CETIRIZINE HYDROCHLORIDE 10 MG/1
10 TABLET ORAL
COMMUNITY
Start: 2023-09-22

## 2024-01-22 NOTE — PROGRESS NOTES
"Chief Complaint  LFT elevated, hepatomegaly, Difficulty Swallowing, and Vomiting    Subjective          History of Present Illness    Usman Burrell is a  43 y.o. male presents for evaluation of enlarged liver noted incidentally on imaging.  Referred by Esther Patel PA-C/PCP.  He is a former patient of Dr. Brown last seen in 2020.  He will follow with Dr. Riley.    Patient underwent calcium cardiac CT on 1/13/2024 which incidentally showed hepatic steatosis and probable hepatomegaly.  6/8/2023 labs showed normal CMP with normal LFTs.  Normal lipid panel. No personal or family liver disease. No ETOH use, never drug use or heavy ETOH use. No viral hepatitis.  BMI is 39.5.    He has a history of heartburn/reflux and takes esomeprazole 40mg QAM for this.  Underwent EGD and esophagram in 2020. Reports excessive throat clearing and globus sensation--mucus sensation. Mild trouble swallowing--food, drinks water which helps. Occasional heartburn.  He is allergic to chicken via testing with allergist so he avoids this.    7/31/2020 Egd showed Z-line irregular, Esophageal mucosal changes suspicious for eosinophilic esophagitis. Gastritis. A few gastric polyps.  Pathology showed fundic gland polyps, negative gastric, GEJ, and esophageal biopsies.    He had screening Cologuard testing on 7/6/2023 for age 43 which was negative. No FH CRC in 1st degree relatives. Brother has Crohn's disease, sister IBS.  He has history of anxiety and hypertension.  No history of diabetes.    Objective   Vital Signs:   /92   Pulse 88   Temp 96.1 °F (35.6 °C) (Temporal)   Ht 172.7 cm (68\")   Wt 118 kg (260 lb)   SpO2 94%   BMI 39.53 kg/m²       Physical Exam  Vitals reviewed.   Constitutional:       General: He is awake. He is not in acute distress.     Appearance: Normal appearance. He is well-developed and well-groomed.   HENT:      Head: Normocephalic.   Pulmonary:      Effort: Pulmonary effort is normal. No respiratory distress. "   Abdominal:      General: Abdomen is protuberant. Bowel sounds are normal. There is no distension.      Palpations: Abdomen is soft. There is no mass.      Tenderness: There is no abdominal tenderness.      Comments: Questionable hepatomegaly possibly 1 fingerbreadth below sternal margin but difficult to palpate to abdominal obesity.    Skin:     Coloration: Skin is not pale.   Neurological:      Mental Status: He is alert and oriented to person, place, and time.      Gait: Gait is intact.   Psychiatric:         Mood and Affect: Mood and affect normal.         Speech: Speech normal.         Behavior: Behavior is cooperative.         Judgment: Judgment normal.          Result Review :             Assessment and Plan    Diagnoses and all orders for this visit:    1. Hepatomegaly (Primary)  -     CT Abdomen With & Without Contrast  -     Hepatitis Panel, Acute  -     Comprehensive Metabolic Panel    2. Fatty liver  -     CT Abdomen With & Without Contrast  -     Hepatitis Panel, Acute  -     Comprehensive Metabolic Panel    3. Gastroesophageal reflux disease with esophagitis without hemorrhage    Other orders  -     pantoprazole (PROTONIX) 40 MG EC tablet; Take 1 tablet by mouth Every Evening.  Dispense: 90 tablet; Refill: 1    Recommend proceeding with multiphase CT scan of the liver as well as labs above for further assessment of hepatic steatosis and probable hepatomegaly noted on cardiac CT.  Did not perform ultrasound due to BMI which will obscure images.  Discussed hepatic steatosis and recommendation for low fat diet, weight loss, and increase activity/exercise.    Suspect throat clearing and bolus sensation are flares of his GERD.  We reviewed and recommended GERD diet and lifestyle changes, change PPI pantoprazole and move to evening to cover for nighttime reflux.  If symptoms persist, could consider repeat EGD.  He had EGD in 2020 for similar symptoms.    Follow Up   Return in about 6 weeks (around  3/4/2024).    Dragon dictation used throughout this note.     Amarilis Natarajan PA-C

## 2024-01-23 LAB
HAV IGM SERPL QL IA: NEGATIVE
HBV CORE IGM SERPL QL IA: NEGATIVE
HBV SURFACE AG SERPL QL IA: NEGATIVE
HCV AB SERPL QL IA: NORMAL
HCV IGG SERPL QL IA: NON REACTIVE

## 2024-01-29 ENCOUNTER — TELEPHONE (OUTPATIENT)
Dept: GASTROENTEROLOGY | Facility: CLINIC | Age: 44
End: 2024-01-29
Payer: COMMERCIAL

## 2024-01-29 NOTE — TELEPHONE ENCOUNTER
----- Message from Usman Burrell sent at 1/29/2024  2:13 PM EST -----  Regarding: Ct scan  Contact: [phone]  I would like the order sent to [facility]  fax number [phone], if you let me know when it's sent they told me to call back to make an appt it would be faster then waiting on them to call back.    Thanks!

## 2024-02-01 ENCOUNTER — TELEPHONE (OUTPATIENT)
Dept: GASTROENTEROLOGY | Facility: CLINIC | Age: 44
End: 2024-02-01
Payer: COMMERCIAL

## 2024-02-01 NOTE — TELEPHONE ENCOUNTER
Hub staff attempted to follow warm transfer process and was unsuccessful     Caller: KWAN    Relationship to patient: Other    Best call back number: [phone]    Patient is needing: KWAN CALLED IN FROM [facility]. JACEK SENT OVER A PATIENT FOR A CT AND THAT OFFICE IS NEEDING THE CLINICAL NOTES. PLEASE CONTACT KWAN REGARDING THIS.

## 2024-02-12 ENCOUNTER — TELEPHONE (OUTPATIENT)
Dept: GASTROENTEROLOGY | Facility: CLINIC | Age: 44
End: 2024-02-12
Payer: COMMERCIAL

## 2024-02-12 DIAGNOSIS — K76.0 FATTY LIVER: Primary | ICD-10-CM

## 2024-02-12 DIAGNOSIS — R16.0 HEPATOMEGALY: ICD-10-CM

## 2024-02-15 RX ORDER — AMLODIPINE BESYLATE 2.5 MG/1
TABLET ORAL
Qty: 30 TABLET | Refills: 0 | Status: SHIPPED | OUTPATIENT
Start: 2024-02-15

## 2024-02-21 ENCOUNTER — TELEPHONE (OUTPATIENT)
Dept: GASTROENTEROLOGY | Facility: CLINIC | Age: 44
End: 2024-02-21
Payer: COMMERCIAL

## 2024-02-21 DIAGNOSIS — R16.0 HEPATOMEGALY: Primary | ICD-10-CM

## 2024-02-21 DIAGNOSIS — K76.0 FATTY LIVER: ICD-10-CM

## 2024-02-21 NOTE — TELEPHONE ENCOUNTER
Ct abdomen image from [facility] fax over.     Scan in media under [facility] image

## 2024-02-22 ENCOUNTER — TELEPHONE (OUTPATIENT)
Dept: GASTROENTEROLOGY | Facility: CLINIC | Age: 44
End: 2024-02-22
Payer: COMMERCIAL

## 2024-02-22 NOTE — TELEPHONE ENCOUNTER
----- Message from Amarilis Natarajan PA-C sent at 2/21/2024  5:12 PM EST -----  I can see her patient is scheduled for FibroScan.  I do not see that she is scheduled for a CT scan of the liver-can you make sure she has also scheduled this?

## 2024-02-22 NOTE — TELEPHONE ENCOUNTER
Josee, I reviewed CT scan on 2/12/2024 which showed:  2/5/2024 CT scan with and without contrast for hepatomegaly showed mildly enlarged liver at 16 cm, diffuse hepatic steatosis, no mass, normal gallbladder, pancreas, spleen. Simple cyst in the superior left hepatic lobe. Few diverticula.     Radiology had sent message asking for ultrasound as they did not see the CT scan under media.  I did order the ultrasound but CT scan of the liver was done and is documented under media section.

## 2024-02-27 DIAGNOSIS — K76.0 FATTY LIVER: ICD-10-CM

## 2024-02-27 DIAGNOSIS — R16.0 HEPATOMEGALY: Primary | ICD-10-CM

## 2024-02-28 ENCOUNTER — HOSPITAL ENCOUNTER (OUTPATIENT)
Facility: HOSPITAL | Age: 44
Discharge: HOME | End: 2024-02-28
Admitting: PHYSICIAN ASSISTANT
Payer: COMMERCIAL

## 2024-02-28 DIAGNOSIS — R16.0 HEPATOMEGALY: ICD-10-CM

## 2024-02-28 DIAGNOSIS — K76.0 FATTY LIVER: ICD-10-CM

## 2024-02-28 PROCEDURE — 76705 ECHO EXAM OF ABDOMEN: CPT

## 2024-02-28 PROCEDURE — 0690T QUAN US TIS CHARAC W/DX US: CPT

## 2024-02-28 PROCEDURE — 76981 USE PARENCHYMA: CPT

## 2024-03-04 ENCOUNTER — TELEPHONE (OUTPATIENT)
Dept: GASTROENTEROLOGY | Facility: CLINIC | Age: 44
End: 2024-03-04
Payer: COMMERCIAL

## 2024-03-04 ENCOUNTER — OFFICE VISIT (OUTPATIENT)
Dept: GASTROENTEROLOGY | Facility: CLINIC | Age: 44
End: 2024-03-04
Payer: COMMERCIAL

## 2024-03-04 VITALS
TEMPERATURE: 96.5 F | HEIGHT: 68 IN | OXYGEN SATURATION: 95 % | WEIGHT: 254 LBS | HEART RATE: 84 BPM | BODY MASS INDEX: 38.49 KG/M2

## 2024-03-04 DIAGNOSIS — K21.00 GASTROESOPHAGEAL REFLUX DISEASE WITH ESOPHAGITIS WITHOUT HEMORRHAGE: ICD-10-CM

## 2024-03-04 DIAGNOSIS — R13.19 ESOPHAGEAL DYSPHAGIA: ICD-10-CM

## 2024-03-04 DIAGNOSIS — R11.0 NAUSEA: ICD-10-CM

## 2024-03-04 DIAGNOSIS — Z12.11 ENCOUNTER FOR SCREENING FOR MALIGNANT NEOPLASM OF COLON: ICD-10-CM

## 2024-03-04 DIAGNOSIS — R16.0 HEPATOMEGALY: ICD-10-CM

## 2024-03-04 DIAGNOSIS — K76.0 HEPATIC STEATOSIS: Primary | ICD-10-CM

## 2024-03-04 PROCEDURE — 99214 OFFICE O/P EST MOD 30 MIN: CPT | Performed by: PHYSICIAN ASSISTANT

## 2024-03-04 RX ORDER — ESOMEPRAZOLE MAGNESIUM 40 MG/1
40 CAPSULE, DELAYED RELEASE ORAL DAILY
COMMUNITY
Start: 2024-02-17 | End: 2024-03-04

## 2024-03-04 NOTE — PROGRESS NOTES
"Chief Complaint  Heartburn (Gerd/) and Hepatomegaly    Subjective          History of Present Illness    Usman Burrell is a  43 y.o. male presents for follow-up on enlarged liver and is of throat clearing/globus sensation.  He will follow with Dr. Riley.    At last visit he was worked up for hepatic steatosis and probable hepatomegaly noted on calcium cardiac CT scan.  1/22/2024 normal LFTs and negative acute hepatitis panel.  2/5/2024 CT scan with and without contrast for hepatomegaly showed mildly enlarged liver at 16 cm, diffuse hepatic steatosis, no mass, normal gallbladder, pancreas, spleen. Simple cyst in the superior left hepatic lobe. Few diverticula.   2/28/2024 FibroScan with ultrasound showed severe hepatic steatosis.  Liver stiffness value within normal range.    Also reported excessive throat clearing and globus sensation with mild trouble swallowing food.  He was started on pantoprazole 40 mg daily for this. He reports a little better. Wonders if he has a food allergy. Nausea hours after eating. Denies vomiting. No NSAID use.     From 1/22/2024 office visit:  7/31/2020 Egd showed Z-line irregular, Esophageal mucosal changes suspicious for eosinophilic esophagitis. Gastritis. A few gastric polyps.  Pathology showed fundic gland polyps, negative gastric, GEJ, and esophageal biopsies.     He had screening Cologuard testing on 7/6/2023 for age 43 which was negative. No FH CRC in 1st degree relatives. Brother has Crohn's disease, sister IBS.  He has history of anxiety and hypertension.      No history of diabetes. No personal or family liver disease. No ETOH use, never drug use or heavy ETOH use. No viral hepatitis.  BMI is 39.5.     Objective   Vital Signs:   Pulse 84   Temp 96.5 °F (35.8 °C) (Temporal)   Ht 172.7 cm (68\")   Wt 115 kg (254 lb)   SpO2 95%   BMI 38.62 kg/m²       Physical Exam  Vitals reviewed.   Constitutional:       General: He is awake. He is not in acute distress.     Appearance: " Normal appearance. He is well-developed and well-groomed.   HENT:      Head: Normocephalic.   Pulmonary:      Effort: Pulmonary effort is normal. No respiratory distress.   Skin:     Coloration: Skin is not pale.   Neurological:      Mental Status: He is alert and oriented to person, place, and time.      Gait: Gait is intact.   Psychiatric:         Mood and Affect: Mood and affect normal.         Speech: Speech normal.         Behavior: Behavior is cooperative.         Judgment: Judgment normal.          Result Review :             Assessment and Plan    Diagnoses and all orders for this visit:    1. Hepatic steatosis (Primary)    2. Hepatomegaly    3. Gastroesophageal reflux disease with esophagitis without hemorrhage  -     Case Request; Standing  -     Implement Anesthesia orders day of procedure.; Standing  -     Verify bowel prep was successful; Standing  -     Give tap water enema if bowel prep was insufficient; Standing  -     Prepare and Witness Surgical Consent Form; Standing  -     Case Request    4. Esophageal dysphagia  -     Case Request; Standing  -     Implement Anesthesia orders day of procedure.; Standing  -     Verify bowel prep was successful; Standing  -     Give tap water enema if bowel prep was insufficient; Standing  -     Prepare and Witness Surgical Consent Form; Standing  -     Case Request    5. Nausea    6. Encounter for screening for malignant neoplasm of colon  -     Case Request; Standing  -     Implement Anesthesia orders day of procedure.; Standing  -     Verify bowel prep was successful; Standing  -     Give tap water enema if bowel prep was insufficient; Standing  -     Prepare and Witness Surgical Consent Form; Standing  -     Case Request    Discussed that he does have a fatty liver.  This is likely the source of his hepatomegaly. Would recommend monitoring his LFTs every 6 months to 1 year.  Recommend healthy low-fat diet, regular exercise, weight loss.    Plan for EGD for UGI  symptoms. Colonoscopy for screening.     Follow Up   Return for Colonoscopy, EGD.    Dragon dictation used throughout this note.     Amarilis Natarajan PA-C

## 2024-03-04 NOTE — TELEPHONE ENCOUNTER
THIAGO Horan for COLONOSCOPY & EGD 5/7/2024 on   arrive at 1:00  . Gave  prep instructions to pt in office ....miralax

## 2024-03-04 NOTE — Clinical Note
43M referred for hepatomegaly on CT with hepatic steatosis.  Normal LFTs.  FibroScan with severe hepatic steatosis-liver stiffness value within normal range.  Discussed fatty liver and lifestyle changes with him.  Do you want me to do any serologic workup for this?

## 2024-03-18 RX ORDER — AMLODIPINE BESYLATE 2.5 MG/1
2.5 TABLET ORAL DAILY
Qty: 15 TABLET | Refills: 0 | Status: SHIPPED | OUTPATIENT
Start: 2024-03-18

## 2024-03-22 ENCOUNTER — PATIENT MESSAGE (OUTPATIENT)
Dept: GASTROENTEROLOGY | Facility: CLINIC | Age: 44
End: 2024-03-22
Payer: COMMERCIAL

## 2024-03-22 RX ORDER — PANTOPRAZOLE SODIUM 40 MG/1
40 TABLET, DELAYED RELEASE ORAL 2 TIMES DAILY WITH MEALS
Qty: 180 TABLET | Refills: 1 | Status: SHIPPED | OUTPATIENT
Start: 2024-03-22

## 2024-03-22 NOTE — TELEPHONE ENCOUNTER
From: Usman Burrell  To: Amarilis Natarajan  Sent: 3/22/2024 6:22 AM EDT  Subject: Heatburn    This week I've had really bad heartburn to the point of having trouble sleeping and causing nausea is there anything I can add to help with it?

## 2024-03-27 ENCOUNTER — TELEPHONE (OUTPATIENT)
Dept: GASTROENTEROLOGY | Facility: CLINIC | Age: 44
End: 2024-03-27
Payer: COMMERCIAL

## 2024-03-27 NOTE — TELEPHONE ENCOUNTER
Patient called. No answer. Left message on an identified VM advising as per Amarilis's note. Advised he may come the office to have it drawn but will need an appointment. His other options are outpatient [facility] or [facility].

## 2024-03-27 NOTE — TELEPHONE ENCOUNTER
----- Message from Amarilis Natarajan PA-C sent at 3/25/2024  4:01 PM EDT -----  Can you call patient and see if he can have iron panel and ferritin done to make sure he does not have hemochromatosis?  This was per Dr. Riley's recommendations.  I did call him a little bit ago about colonoscopy recommendations but forgot to mention the labs.  I apologize for that.

## 2024-04-13 LAB
FERRITIN SERPL-MCNC: 563 NG/ML (ref 30–400)
IRON SATN MFR SERPL: 34 % (ref 15–55)
IRON SERPL-MCNC: 105 UG/DL (ref 38–169)
TIBC SERPL-MCNC: 312 UG/DL (ref 250–450)
UIBC SERPL-MCNC: 207 UG/DL (ref 111–343)

## 2024-04-15 DIAGNOSIS — R16.0 HEPATOMEGALY: ICD-10-CM

## 2024-04-15 DIAGNOSIS — K76.0 FATTY LIVER: ICD-10-CM

## 2024-04-15 DIAGNOSIS — K76.0 HEPATIC STEATOSIS: Primary | ICD-10-CM

## 2024-04-15 DIAGNOSIS — R79.89 ELEVATED FERRITIN: ICD-10-CM

## 2024-04-16 ENCOUNTER — TELEPHONE (OUTPATIENT)
Dept: GASTROENTEROLOGY | Facility: CLINIC | Age: 44
End: 2024-04-16
Payer: COMMERCIAL

## 2024-04-16 RX ORDER — METHYLPREDNISOLONE 4 MG/1
TABLET ORAL
Qty: 21 TABLET | Refills: 0 | Status: SHIPPED | OUTPATIENT
Start: 2024-04-16

## 2024-04-16 NOTE — TELEPHONE ENCOUNTER
Called pt and advised of Amarilis Natarajan PA-C's note.  Pt verbalized understanding.    Pt will go to [facility] near his place of employment to have drawn.

## 2024-04-16 NOTE — TELEPHONE ENCOUNTER
----- Message from Amarilis Natarajan PA-C sent at 4/15/2024 10:03 AM EDT -----  Please let patient know that his iron panel was normal but his ferritin which are his iron stores are elevated at 563.  We need to recheck an additional lab test for the gene, hereditary hemochromatosis, since his iron stores are high.  I placed order-have him get the labs done and let him know it will take at least a week if not to to get this result back.

## 2024-04-16 NOTE — TELEPHONE ENCOUNTER
Hub staff attempted to follow warm transfer process and was unsuccessful     Caller: Usman Burrell    Relationship to patient: Self    Best call back number: [phone]    Patient is needing: PT RETURNED CALL TO [facility].

## 2024-05-02 ENCOUNTER — TELEPHONE (OUTPATIENT)
Dept: GASTROENTEROLOGY | Facility: CLINIC | Age: 44
End: 2024-05-02
Payer: COMMERCIAL

## 2024-05-02 DIAGNOSIS — K20.0 ESOPHAGITIS, EOSINOPHILIC: Primary | ICD-10-CM

## 2024-05-02 NOTE — TELEPHONE ENCOUNTER
----- Message from Jerica ZAPIEN sent at 4/30/2024 10:45 AM EDT -----  Regarding: egd  Pt is scheduled for colonoscopy/egd for 5/7. He is wanting just the EGD because colonoscopy will not be covered by his insurance. Will you put order in for just EGD.

## 2024-05-07 ENCOUNTER — ANESTHESIA EVENT (OUTPATIENT)
Dept: GASTROENTEROLOGY | Facility: HOSPITAL | Age: 44
End: 2024-05-07
Payer: COMMERCIAL

## 2024-05-07 ENCOUNTER — ANESTHESIA (OUTPATIENT)
Dept: GASTROENTEROLOGY | Facility: HOSPITAL | Age: 44
End: 2024-05-07
Payer: COMMERCIAL

## 2024-05-07 ENCOUNTER — HOSPITAL ENCOUNTER (OUTPATIENT)
Facility: HOSPITAL | Age: 44
Setting detail: HOSPITAL OUTPATIENT SURGERY
Discharge: HOME | End: 2024-05-07
Attending: INTERNAL MEDICINE | Admitting: INTERNAL MEDICINE
Payer: COMMERCIAL

## 2024-05-07 VITALS
HEART RATE: 82 BPM | WEIGHT: 259.6 LBS | OXYGEN SATURATION: 93 % | DIASTOLIC BLOOD PRESSURE: 92 MMHG | SYSTOLIC BLOOD PRESSURE: 134 MMHG | BODY MASS INDEX: 39.34 KG/M2 | RESPIRATION RATE: 19 BRPM | HEIGHT: 68 IN

## 2024-05-07 DIAGNOSIS — R13.19 ESOPHAGEAL DYSPHAGIA: ICD-10-CM

## 2024-05-07 DIAGNOSIS — Z12.11 ENCOUNTER FOR SCREENING FOR MALIGNANT NEOPLASM OF COLON: ICD-10-CM

## 2024-05-07 DIAGNOSIS — K21.00 GASTROESOPHAGEAL REFLUX DISEASE WITH ESOPHAGITIS WITHOUT HEMORRHAGE: ICD-10-CM

## 2024-05-07 PROCEDURE — 25010000002 PROPOFOL 1000 MG/100ML EMULSION

## 2024-05-07 PROCEDURE — 25010000002 GLYCOPYRROLATE 0.2 MG/ML SOLUTION

## 2024-05-07 PROCEDURE — 88305 TISSUE EXAM BY PATHOLOGIST: CPT | Performed by: INTERNAL MEDICINE

## 2024-05-07 PROCEDURE — 25810000003 LACTATED RINGERS PER 1000 ML: Performed by: INTERNAL MEDICINE

## 2024-05-07 RX ORDER — LIDOCAINE HYDROCHLORIDE 20 MG/ML
INJECTION, SOLUTION INFILTRATION; PERINEURAL AS NEEDED
Status: DISCONTINUED | OUTPATIENT
Start: 2024-05-07 | End: 2024-05-07 | Stop reason: SURG

## 2024-05-07 RX ORDER — SODIUM CHLORIDE, SODIUM LACTATE, POTASSIUM CHLORIDE, CALCIUM CHLORIDE 600; 310; 30; 20 MG/100ML; MG/100ML; MG/100ML; MG/100ML
30 INJECTION, SOLUTION INTRAVENOUS CONTINUOUS PRN
Status: DISCONTINUED | OUTPATIENT
Start: 2024-05-07 | End: 2024-05-07 | Stop reason: HOSPADM

## 2024-05-07 RX ORDER — GLYCOPYRROLATE 0.2 MG/ML
INJECTION INTRAMUSCULAR; INTRAVENOUS AS NEEDED
Status: DISCONTINUED | OUTPATIENT
Start: 2024-05-07 | End: 2024-05-07 | Stop reason: SURG

## 2024-05-07 RX ORDER — PROPOFOL 10 MG/ML
INJECTION, EMULSION INTRAVENOUS AS NEEDED
Status: DISCONTINUED | OUTPATIENT
Start: 2024-05-07 | End: 2024-05-07 | Stop reason: SURG

## 2024-05-07 RX ADMIN — GLYCOPYRROLATE 0.2 MG: 0.2 INJECTION INTRAMUSCULAR; INTRAVENOUS at 13:30

## 2024-05-07 RX ADMIN — PROPOFOL INJECTABLE EMULSION 150 MCG/KG/MIN: 10 INJECTION, EMULSION INTRAVENOUS at 13:33

## 2024-05-07 RX ADMIN — PROPOFOL INJECTABLE EMULSION 100 MG: 10 INJECTION, EMULSION INTRAVENOUS at 13:32

## 2024-05-07 RX ADMIN — LIDOCAINE HYDROCHLORIDE 50 MG: 20 INJECTION, SOLUTION INFILTRATION; PERINEURAL at 13:32

## 2024-05-07 RX ADMIN — SODIUM CHLORIDE, POTASSIUM CHLORIDE, SODIUM LACTATE AND CALCIUM CHLORIDE 30 ML/HR: 600; 310; 30; 20 INJECTION, SOLUTION INTRAVENOUS at 13:22

## 2024-05-08 ENCOUNTER — PATIENT MESSAGE (OUTPATIENT)
Dept: GASTROENTEROLOGY | Facility: CLINIC | Age: 44
End: 2024-05-08
Payer: COMMERCIAL

## 2024-05-08 LAB
LAB AP CASE REPORT: NORMAL
PATH REPORT.FINAL DX SPEC: NORMAL
PATH REPORT.GROSS SPEC: NORMAL

## 2024-05-16 RX ORDER — AMLODIPINE BESYLATE 2.5 MG/1
TABLET ORAL
Qty: 15 TABLET | Refills: 0 | Status: SHIPPED | OUTPATIENT
Start: 2024-05-16

## 2024-06-09 RX ORDER — AMLODIPINE BESYLATE 2.5 MG/1
TABLET ORAL
Qty: 15 TABLET | Refills: 0 | Status: SHIPPED | OUTPATIENT
Start: 2024-06-09

## 2024-06-24 DIAGNOSIS — K21.9 GASTROESOPHAGEAL REFLUX DISEASE WITHOUT ESOPHAGITIS: ICD-10-CM

## 2024-06-24 DIAGNOSIS — I10 PRIMARY HYPERTENSION: ICD-10-CM

## 2024-06-24 DIAGNOSIS — R73.01 IMPAIRED FASTING GLUCOSE: ICD-10-CM

## 2024-06-24 DIAGNOSIS — E53.8 LOW SERUM VITAMIN B12: ICD-10-CM

## 2024-06-24 DIAGNOSIS — E53.8 LOW FOLIC ACID: ICD-10-CM

## 2024-06-24 DIAGNOSIS — E55.9 VITAMIN D DEFICIENCY: Primary | ICD-10-CM

## 2024-06-27 ENCOUNTER — TELEPHONE (OUTPATIENT)
Dept: FAMILY MEDICINE CLINIC | Facility: CLINIC | Age: 44
End: 2024-06-27
Payer: COMMERCIAL

## 2024-06-27 NOTE — TELEPHONE ENCOUNTER
Please let him know he can see me for a regular appointment and can do med refills and discuss labs.  This availability is a few weeks wait

## 2024-06-27 NOTE — TELEPHONE ENCOUNTER
Caller: Usman Burrell    Relationship to patient: Self    Best call back number: [phone]    Chief complaint: PHYSICAL    Type of visit: PHYSICAL    Requested date: ASAP     Additional notes:HE PREFERS TO STAY WITH [facility] BUT THERE ARE NO APPOINTMENTS AVAILABLE.

## 2024-07-02 LAB
25(OH)D3+25(OH)D2 SERPL-MCNC: 45.6 NG/ML (ref 30–100)
ALBUMIN SERPL-MCNC: 4.5 G/DL (ref 4.1–5.1)
ALP SERPL-CCNC: 78 IU/L (ref 44–121)
ALT SERPL-CCNC: 34 IU/L (ref 0–44)
APPEARANCE UR: CLEAR
AST SERPL-CCNC: 20 IU/L (ref 0–40)
BACTERIA #/AREA URNS HPF: NORMAL /[HPF]
BASOPHILS # BLD AUTO: 0.1 X10E3/UL (ref 0–0.2)
BASOPHILS NFR BLD AUTO: 2 %
BILIRUB SERPL-MCNC: 0.4 MG/DL (ref 0–1.2)
BILIRUB UR QL STRIP: NEGATIVE
BUN SERPL-MCNC: 17 MG/DL (ref 6–24)
BUN/CREAT SERPL: 16 (ref 9–20)
CALCIUM SERPL-MCNC: 9.4 MG/DL (ref 8.7–10.2)
CASTS URNS QL MICRO: NORMAL /LPF
CHLORIDE SERPL-SCNC: 104 MMOL/L (ref 96–106)
CHOLEST SERPL-MCNC: 151 MG/DL (ref 100–199)
CO2 SERPL-SCNC: 27 MMOL/L (ref 20–29)
COLOR UR: YELLOW
CREAT SERPL-MCNC: 1.09 MG/DL (ref 0.76–1.27)
EGFRCR SERPLBLD CKD-EPI 2021: 85 ML/MIN/1.73
EOSINOPHIL # BLD AUTO: 0.2 X10E3/UL (ref 0–0.4)
EOSINOPHIL NFR BLD AUTO: 4 %
EPI CELLS #/AREA URNS HPF: NORMAL /HPF (ref 0–10)
ERYTHROCYTE [DISTWIDTH] IN BLOOD BY AUTOMATED COUNT: 13.1 % (ref 11.6–15.4)
FOLATE SERPL-MCNC: 9.3 NG/ML
GLOBULIN SER CALC-MCNC: 2.3 G/DL (ref 1.5–4.5)
GLUCOSE SERPL-MCNC: 100 MG/DL (ref 70–99)
GLUCOSE UR QL STRIP: NEGATIVE
HBA1C MFR BLD: 5.7 % (ref 4.8–5.6)
HCT VFR BLD AUTO: 45.6 % (ref 37.5–51)
HDLC SERPL-MCNC: 46 MG/DL
HGB BLD-MCNC: 15.7 G/DL (ref 13–17.7)
HGB UR QL STRIP: NEGATIVE
IMM GRANULOCYTES # BLD AUTO: 0 X10E3/UL (ref 0–0.1)
IMM GRANULOCYTES NFR BLD AUTO: 0 %
KETONES UR QL STRIP: NEGATIVE
LDLC SERPL CALC-MCNC: 93 MG/DL (ref 0–99)
LEUKOCYTE ESTERASE UR QL STRIP: NEGATIVE
LYMPHOCYTES # BLD AUTO: 0.9 X10E3/UL (ref 0.7–3.1)
LYMPHOCYTES NFR BLD AUTO: 19 %
MAGNESIUM SERPL-MCNC: 2.3 MG/DL (ref 1.6–2.3)
MCH RBC QN AUTO: 32.2 PG (ref 26.6–33)
MCHC RBC AUTO-ENTMCNC: 34.4 G/DL (ref 31.5–35.7)
MCV RBC AUTO: 94 FL (ref 79–97)
MICRO URNS: NORMAL
MICRO URNS: NORMAL
MONOCYTES # BLD AUTO: 0.7 X10E3/UL (ref 0.1–0.9)
MONOCYTES NFR BLD AUTO: 15 %
NEUTROPHILS # BLD AUTO: 2.7 X10E3/UL (ref 1.4–7)
NEUTROPHILS NFR BLD AUTO: 60 %
NITRITE UR QL STRIP: NEGATIVE
PH UR STRIP: 6 [PH] (ref 5–7.5)
PLATELET # BLD AUTO: 220 X10E3/UL (ref 150–450)
POTASSIUM SERPL-SCNC: 4.3 MMOL/L (ref 3.5–5.2)
PROT SERPL-MCNC: 6.8 G/DL (ref 6–8.5)
PROT UR QL STRIP: NEGATIVE
RBC # BLD AUTO: 4.87 X10E6/UL (ref 4.14–5.8)
RBC #/AREA URNS HPF: NORMAL /HPF (ref 0–2)
SODIUM SERPL-SCNC: 141 MMOL/L (ref 134–144)
SP GR UR STRIP: 1.03 (ref 1–1.03)
T4 FREE SERPL-MCNC: 1.26 NG/DL (ref 0.82–1.77)
TRIGL SERPL-MCNC: 60 MG/DL (ref 0–149)
TSH SERPL DL<=0.005 MIU/L-ACNC: 1.82 UIU/ML (ref 0.45–4.5)
UROBILINOGEN UR STRIP-MCNC: 1 MG/DL (ref 0.2–1)
VIT B12 SERPL-MCNC: 566 PG/ML (ref 232–1245)
VLDLC SERPL CALC-MCNC: 12 MG/DL (ref 5–40)
WBC # BLD AUTO: 4.5 X10E3/UL (ref 3.4–10.8)
WBC #/AREA URNS HPF: NORMAL /HPF (ref 0–5)

## 2024-07-10 ENCOUNTER — OFFICE VISIT (OUTPATIENT)
Dept: FAMILY MEDICINE CLINIC | Facility: CLINIC | Age: 44
End: 2024-07-10
Payer: COMMERCIAL

## 2024-07-10 VITALS
BODY MASS INDEX: 39.25 KG/M2 | WEIGHT: 259 LBS | TEMPERATURE: 97.7 F | HEIGHT: 68 IN | OXYGEN SATURATION: 95 % | SYSTOLIC BLOOD PRESSURE: 130 MMHG | RESPIRATION RATE: 16 BRPM | DIASTOLIC BLOOD PRESSURE: 96 MMHG | HEART RATE: 73 BPM

## 2024-07-10 DIAGNOSIS — K21.00 GASTROESOPHAGEAL REFLUX DISEASE WITH ESOPHAGITIS WITHOUT HEMORRHAGE: ICD-10-CM

## 2024-07-10 DIAGNOSIS — I10 PRIMARY HYPERTENSION: Primary | ICD-10-CM

## 2024-07-10 DIAGNOSIS — E55.9 VITAMIN D DEFICIENCY: ICD-10-CM

## 2024-07-10 DIAGNOSIS — R73.01 IMPAIRED FASTING GLUCOSE: ICD-10-CM

## 2024-07-10 DIAGNOSIS — K76.0 FATTY LIVER: ICD-10-CM

## 2024-07-10 DIAGNOSIS — F41.1 GENERALIZED ANXIETY DISORDER: ICD-10-CM

## 2024-07-10 PROCEDURE — 99214 OFFICE O/P EST MOD 30 MIN: CPT | Performed by: PHYSICIAN ASSISTANT

## 2024-07-10 RX ORDER — CARVEDILOL 12.5 MG/1
12.5 TABLET ORAL 2 TIMES DAILY WITH MEALS
Qty: 180 TABLET | Refills: 1 | Status: SHIPPED | OUTPATIENT
Start: 2024-07-10

## 2024-07-10 RX ORDER — SEMAGLUTIDE 0.25 MG/.5ML
0.25 INJECTION, SOLUTION SUBCUTANEOUS WEEKLY
Qty: 2 ML | Refills: 0 | Status: SHIPPED | OUTPATIENT
Start: 2024-07-10

## 2024-07-10 RX ORDER — AMLODIPINE BESYLATE 2.5 MG/1
2.5 TABLET ORAL DAILY
Qty: 90 TABLET | Refills: 1 | Status: SHIPPED | OUTPATIENT
Start: 2024-07-10

## 2024-07-10 NOTE — PROGRESS NOTES
Subjective   Usman Burrell is a 44 y.o. male.     History of Present Illness    Since the last visit, he has overall felt fairly well.  He has Primary Hypertension not at goal, plan to add/adjust medication, Impaired fasting glucose and will monitor labs to watch for DMII, GERD controlled on PPI Rx, and Vitamin D deficiency and labs are at goal >30 ng/mL.  he has been compliant with current medications have reviewed them.  The patient denies medication side effects.  Will refill medications. There were no vitals taken for this visit..  Class 2 Severe Obesity (BMI >=35 and <=39.9). Obesity-related health conditions include the following: hypertension and GERD. Obesity is worsening. BMI is is above average; BMI management plan is completed. We discussed low calorie, low carb based diet program, portion control, and increasing exercise.  Reaction to ACE    Results for orders placed or performed in visit on 06/24/24   Comprehensive metabolic panel    Specimen: Blood   Result Value Ref Range    Glucose 100 (H) 70 - 99 mg/dL    BUN 17 6 - 24 mg/dL    Creatinine 1.09 0.76 - 1.27 mg/dL    EGFR Result 85 >59 mL/min/1.73    BUN/Creatinine Ratio 16 9 - 20    Sodium 141 134 - 144 mmol/L    Potassium 4.3 3.5 - 5.2 mmol/L    Chloride 104 96 - 106 mmol/L    Total CO2 27 20 - 29 mmol/L    Calcium 9.4 8.7 - 10.2 mg/dL    Total Protein 6.8 6.0 - 8.5 g/dL    Albumin 4.5 4.1 - 5.1 g/dL    Globulin 2.3 1.5 - 4.5 g/dL    Total Bilirubin 0.4 0.0 - 1.2 mg/dL    Alkaline Phosphatase 78 44 - 121 IU/L    AST (SGOT) 20 0 - 40 IU/L    ALT (SGPT) 34 0 - 44 IU/L   Lipid panel    Specimen: Blood   Result Value Ref Range    Total Cholesterol 151 100 - 199 mg/dL    Triglycerides 60 0 - 149 mg/dL    HDL Cholesterol 46 >39 mg/dL    VLDL Cholesterol Jewel 12 5 - 40 mg/dL    LDL Chol Calc (NIH) 93 0 - 99 mg/dL   TSH    Specimen: Blood   Result Value Ref Range    TSH 1.820 0.450 - 4.500 uIU/mL   Hemoglobin A1c    Specimen: Blood   Result Value Ref Range     Hemoglobin A1C 5.7 (H) 4.8 - 5.6 %   Magnesium    Specimen: Blood   Result Value Ref Range    Magnesium 2.3 1.6 - 2.3 mg/dL   Vitamin D,25-Hydroxy    Specimen: Blood   Result Value Ref Range    25 Hydroxy, Vitamin D 45.6 30.0 - 100.0 ng/mL   Vitamin B12    Specimen: Blood   Result Value Ref Range    Vitamin B-12 566 232 - 1,245 pg/mL   Folate    Specimen: Blood   Result Value Ref Range    Folate 9.3 >3.0 ng/mL   T4, Free    Specimen: Blood   Result Value Ref Range    Free T4 1.26 0.82 - 1.77 ng/dL   Microscopic Examination -   Result Value Ref Range    WBC, UA None seen 0 - 5 /hpf    RBC, UA None seen 0 - 2 /hpf    Epithelial Cells (non renal) None seen 0 - 10 /hpf    Casts None seen None seen /lpf    Bacteria, UA None seen None seen/Few   CBC and Differential    Specimen: Blood   Result Value Ref Range    WBC 4.5 3.4 - 10.8 x10E3/uL    RBC 4.87 4.14 - 5.80 x10E6/uL    Hemoglobin 15.7 13.0 - 17.7 g/dL    Hematocrit 45.6 37.5 - 51.0 %    MCV 94 79 - 97 fL    MCH 32.2 26.6 - 33.0 pg    MCHC 34.4 31.5 - 35.7 g/dL    RDW 13.1 11.6 - 15.4 %    Platelets 220 150 - 450 x10E3/uL    Neutrophil Rel % 60 Not Estab. %    Lymphocyte Rel % 19 Not Estab. %    Monocyte Rel % 15 Not Estab. %    Eosinophil Rel % 4 Not Estab. %    Basophil Rel % 2 Not Estab. %    Neutrophils Absolute 2.7 1.4 - 7.0 x10E3/uL    Lymphocytes Absolute 0.9 0.7 - 3.1 x10E3/uL    Monocytes Absolute 0.7 0.1 - 0.9 x10E3/uL    Eosinophils Absolute 0.2 0.0 - 0.4 x10E3/uL    Basophils Absolute 0.1 0.0 - 0.2 x10E3/uL    Immature Granulocyte Rel % 0 Not Estab. %    Immature Grans Absolute 0.0 0.0 - 0.1 x10E3/uL   Urinalysis With Microscopic - Urine, Clean Catch    Specimen: Urine, Clean Catch   Result Value Ref Range    Specific Gravity, UA 1.028 1.005 - 1.030    pH, UA 6.0 5.0 - 7.5    Color, UA Yellow Yellow    Appearance, UA Clear Clear    Leukocytes, UA Negative Negative    Protein Negative Negative/Trace    Glucose, UA Negative Negative    Ketones Negative  Negative    Blood, UA Negative Negative    Bilirubin, UA Negative Negative    Urobilinogen, UA 1.0 0.2 - 1.0 mg/dL    Nitrite, UA Negative Negative    Microscopic Examination Comment     Microscopic Examination See below:    The 10-year ASCVD risk score (Tr OVIEDO, et al., 2019) is: 1.9%    Values used to calculate the score:      Age: 44 years      Sex: Male      Is Non- : No      Diabetic: No      Tobacco smoker: No      Systolic Blood Pressure: 130 mmHg      Is BP treated: Yes      HDL Cholesterol: 46 mg/dL      Total Cholesterol: 151 mg/dL    7/2/2024  5:47 PM EDT       Your glucose and hemoglobin A1c, average glucose 2 to 3 months is in prediabetes range.  Recommend low glycemic index diet, exercise and keeping weight down.  Will need to check this lab yearly.  Kidney and liver functions are in range.  Cholesterol is in normal range and your rescore is less than 7.5%.  That is good.  Thyroid labs in range.  Vitamin levels in range.  Negative urine.  Other labs negative.   I do want him to consider starting metformin for fatty liver and impaired fasting glucose  Patient has not seen me since 6/12/2023 and needs to see me every 6 months for management of hypertension  I did refer him to GI and saw ROSA Natarajan on 3/4/2024 for evaluation of fatty liver disease and GERD had additional labs and noted prior imaging CT scan 2/5/2024 noting hepatomegaly.  FibroScan was completed on 2/28/2024 with ultrasound showing severe hepatic steatosis.  Also concerns with dysphagia.  EGD performed on 5/7/2024 Dr Riley and noted Schatzki's ring.  Also mild gastritis and small hiatal hernia.  Patient saw Dr. [facility] cardiology for evaluation of shortness of breath on 1/21/2021 noting his EKG was unremarkable.  After his evaluation noted patient did not need further workup at this time.  Patient has no family history of thyroid cancer or M EN.  Has no history of gastroparesis or pancreatitis  The following  portions of the patient's history were reviewed and updated as appropriate: allergies, current medications, past family history, past medical history, past social history, past surgical history, and problem list.    Review of Systems    Objective   Physical Exam  Vitals and nursing note reviewed.   Constitutional:       General: He is not in acute distress.     Appearance: He is well-developed. He is obese. He is not diaphoretic.   HENT:      Head: Normocephalic.      Right Ear: External ear normal.      Left Ear: External ear normal.   Eyes:      General: No scleral icterus.     Conjunctiva/sclera: Conjunctivae normal.      Pupils: Pupils are equal, round, and reactive to light.   Cardiovascular:      Rate and Rhythm: Normal rate and regular rhythm.      Pulses: Normal pulses.      Heart sounds: Normal heart sounds. No murmur heard.     Comments: Varicose vein right lower extremity can ache at night consider seeing vascular surgeon  Pulmonary:      Effort: Pulmonary effort is normal.      Breath sounds: Normal breath sounds. No rales.   Musculoskeletal:         General: Normal range of motion.      Cervical back: Normal range of motion and neck supple.      Right lower leg: No edema.      Left lower leg: No edema.   Skin:     General: Skin is warm and dry.      Findings: No rash.   Neurological:      Mental Status: He is alert and oriented to person, place, and time.   Psychiatric:         Mood and Affect: Mood normal. Affect is not inappropriate.         Behavior: Behavior normal.         Thought Content: Thought content normal.         Judgment: Judgment normal.           Assessment & Plan   Diagnoses and all orders for this visit:    1. Primary hypertension (Primary)    2. Impaired fasting glucose    3. Generalized anxiety disorder    4. Vitamin D deficiency    5. Gastroesophageal reflux disease with esophagitis without hemorrhage    Other orders  -     carvedilol (COREG) 12.5 MG tablet; Take 1 tablet by mouth 2  (Two) Times a Day With Meals. For BP  Dispense: 180 tablet; Refill: 1  -     amLODIPine (NORVASC) 2.5 MG tablet; Take 1 tablet by mouth Daily. For BP  Dispense: 90 tablet; Refill: 1  -     sertraline (Zoloft) 50 MG tablet; one PO daily for stress  Dispense: 90 tablet; Refill: 3        Must follow-up with GI regularly for the fatty liver disease and GERD  Plan, Usman Burrell, was seen today.  he was seen for HTN and continue medication, Imparied fasting glucose and plan follow up labs, diet, and exercise, GERD and will continue on PPI medication, and Vitamin D deficiency and supplemented  Ca cardiac CT was 0 on 1-13-24  Not on B12 or folic  On Vit D  No ACE/ARB---angioedema  We talked about semaglutide and must have small frequent meals also low-fat.  Talked about if he eats a large meal he can have abdominal pain he also may have other GI side effects of nausea vomiting diarrhea.... It can cause pancreatitis in some patients.  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

## 2024-08-13 ENCOUNTER — OFFICE VISIT (OUTPATIENT)
Dept: FAMILY MEDICINE CLINIC | Facility: CLINIC | Age: 44
End: 2024-08-13
Payer: COMMERCIAL

## 2024-08-13 VITALS
OXYGEN SATURATION: 93 % | HEART RATE: 113 BPM | HEIGHT: 68 IN | WEIGHT: 253 LBS | TEMPERATURE: 99.6 F | BODY MASS INDEX: 38.34 KG/M2 | SYSTOLIC BLOOD PRESSURE: 120 MMHG | DIASTOLIC BLOOD PRESSURE: 80 MMHG | RESPIRATION RATE: 16 BRPM

## 2024-08-13 DIAGNOSIS — K76.0 FATTY LIVER: ICD-10-CM

## 2024-08-13 DIAGNOSIS — R52 BODY ACHES: Primary | ICD-10-CM

## 2024-08-13 DIAGNOSIS — K21.9 GASTROESOPHAGEAL REFLUX DISEASE WITHOUT ESOPHAGITIS: ICD-10-CM

## 2024-08-13 DIAGNOSIS — J06.9 VIRAL UPPER RESPIRATORY TRACT INFECTION: ICD-10-CM

## 2024-08-13 DIAGNOSIS — E66.1 CLASS 2 DRUG-INDUCED OBESITY WITH SERIOUS COMORBIDITY AND BODY MASS INDEX (BMI) OF 39.0 TO 39.9 IN ADULT: ICD-10-CM

## 2024-08-13 DIAGNOSIS — I10 PRIMARY HYPERTENSION: ICD-10-CM

## 2024-08-13 DIAGNOSIS — U07.1 COVID: ICD-10-CM

## 2024-08-13 PROBLEM — R05.9 COUGH: Status: ACTIVE | Noted: 2024-08-13

## 2024-08-13 PROBLEM — E66.812 CLASS 2 DRUG-INDUCED OBESITY WITH SERIOUS COMORBIDITY AND BODY MASS INDEX (BMI) OF 39.0 TO 39.9 IN ADULT: Status: ACTIVE | Noted: 2024-08-13

## 2024-08-13 LAB
EXPIRATION DATE: ABNORMAL
FLUAV AG UPPER RESP QL IA.RAPID: NOT DETECTED
FLUBV AG UPPER RESP QL IA.RAPID: NOT DETECTED
INTERNAL CONTROL: ABNORMAL
Lab: ABNORMAL
SARS-COV-2 AG UPPER RESP QL IA.RAPID: DETECTED

## 2024-08-13 PROCEDURE — 87428 SARSCOV & INF VIR A&B AG IA: CPT | Performed by: PHYSICIAN ASSISTANT

## 2024-08-13 PROCEDURE — 99214 OFFICE O/P EST MOD 30 MIN: CPT | Performed by: PHYSICIAN ASSISTANT

## 2024-08-13 RX ORDER — DEXTROMETHORPHAN HYDROBROMIDE AND PROMETHAZINE HYDROCHLORIDE 15; 6.25 MG/5ML; MG/5ML
5 SYRUP ORAL 4 TIMES DAILY PRN
Qty: 180 ML | Refills: 0 | Status: SHIPPED | OUTPATIENT
Start: 2024-08-13

## 2024-08-13 RX ORDER — AZITHROMYCIN 250 MG/1
TABLET, FILM COATED ORAL
Qty: 6 TABLET | Refills: 1 | Status: SHIPPED | OUTPATIENT
Start: 2024-08-13

## 2024-08-13 RX ORDER — SEMAGLUTIDE 0.5 MG/.5ML
0.5 INJECTION, SOLUTION SUBCUTANEOUS WEEKLY
Qty: 2 ML | Refills: 2 | Status: SHIPPED | OUTPATIENT
Start: 2024-08-13

## 2024-08-13 NOTE — PROGRESS NOTES
"Subjective   Usman Burrell is a 44 y.o. male.     Hypertension  Pertinent negatives include no blurred vision.   Fatigue  Associated symptoms include coughing and fatigue. Pertinent negatives include no diaphoresis.   Cough            Since the last visit, he has overall felt well until recent illness.  He has Impaired fasting glucose and will monitor labs to watch for DMII, Vitamin D deficiency and labs are at goal >30 ng/mL, and primary hypertension were following up today from 7/10/2024 restarted his amlodipine noting he has reaction with  ACE--- he been out of his medication over a month and he is back today to have me recheck blood pressure and also started him on the semaglutide compound for treatment of his obesity with comorbid factors of the primary hypertension, fatty liver disease and also noted last visit to follow-up with GI....... .  he has been compliant with current medications have reviewed them.  The patient denies medication side effects.  Will continue the amlodipine for hypertension and raise semaglutide to the 0.5 mg to get his weight down and he is tolerating it without GI side effects. /92   Pulse 113   Temp 99.6 °F (37.6 °C)   Resp 16   Ht 172.7 cm (68\")   Wt 115 kg (253 lb)   SpO2 93%   BMI 38.47 kg/m² .          Results for orders placed or performed in visit on 08/13/24   POCT SARS-CoV-2 Antigen AMANDA + Flu    Specimen: Swab   Result Value Ref Range    SARS Antigen Detected (A) Not Detected, Presumptive Negative    Influenza A Antigen AMANDA Not Detected Not Detected    Influenza B Antigen AMANDA Not Detected Not Detected    Internal Control Passed Passed    Lot Number [id number]     Expiration Date 01/08/2025    He is down 6 lbs  Less hungry  More walking also---will exercise  His wrist cuff measure blood pressure at 147/97 and the manual reading was 130/92 so his was a little bit high  130/77 at home      My last notes 7-10-24  Must follow-up with GI regularly for the fatty liver " disease and GERD  Plan, Usman Burrell, was seen today.  he was seen for HTN and continue medication, Imparied fasting glucose and plan follow up labs, diet, and exercise, GERD and will continue on PPI medication, and Vitamin D deficiency and supplemented  Ca cardiac CT was 0 on 1-13-24  Not on B12 or folic  On Vit D  No ACE/ARB---angioedema  We talked about semaglutide and must have small frequent meals also low-fat.  Talked about if he eats a large meal he can have abdominal pain he also may have other GI side effects of nausea vomiting diarrhea.... It can cause pancreatitis in some patients.  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 male who presents for evaluation of upper respiratory congestion, cough, fatigue. Symptoms include congestion, post nasal drip, cough described as productive of yellow sputum, myalgias, headache, and sweats.  Onset of symptoms was 1 day ago, gradually worsening since that time. Patient denies shortness of breath, wheezing, fever.   Evaluation to date: none Treatment to date:  none.     The following portions of the patient's history were reviewed and updated as appropriate: allergies, current medications, past family history, past medical history, past social history, past surgical history, and problem list.    Review of Systems   Constitutional:  Positive for fatigue. Negative for diaphoresis.   HENT:  Negative for nosebleeds and trouble swallowing.    Eyes:  Negative for blurred vision and visual disturbance.   Respiratory:   Positive for cough. Negative for choking.    Gastrointestinal:  Negative for blood in stool.   Allergic/Immunologic: Negative for immunocompromised state.   Neurological:  Negative for facial asymmetry and speech difficulty.   Psychiatric/Behavioral:  Negative for self-injury and suicidal ideas.        Objective   Physical Exam  Vitals and nursing note reviewed.   Constitutional:       General: He is not in acute distress.     Appearance: He is well-developed. He is not toxic-appearing or diaphoretic.   HENT:      Head: Normocephalic and atraumatic. Hair is normal.      Right Ear: External ear normal. No drainage, swelling or tenderness. Tympanic membrane is retracted.      Left Ear: External ear normal. No drainage, swelling or tenderness. Tympanic membrane is retracted.      Nose: Mucosal edema present.      Mouth/Throat:      Mouth: No oral lesions.      Pharynx: Uvula midline. Posterior oropharyngeal erythema present. No oropharyngeal exudate or uvula swelling.   Eyes:      General: No scleral icterus.        Right eye: No discharge.         Left eye: No discharge.      Conjunctiva/sclera: Conjunctivae normal.      Pupils: Pupils are equal, round, and reactive to light.   Cardiovascular:      Rate and Rhythm: Normal rate and regular rhythm.      Heart sounds: Normal heart sounds. No murmur heard.     No gallop.   Pulmonary:      Effort: No respiratory distress.      Breath sounds: Normal breath sounds. No stridor. No wheezing or rales.   Chest:      Chest wall: No tenderness.   Abdominal:      Palpations: Abdomen is soft.      Tenderness: There is no abdominal tenderness.   Musculoskeletal:      Cervical back: Normal range of motion and neck supple.   Lymphadenopathy:      Cervical: Cervical adenopathy present.   Skin:     General: Skin is warm and dry.      Findings: No rash.   Neurological:      Mental Status: He is alert and oriented to person, place, and time.      Motor: No abnormal muscle tone.    Psychiatric:         Behavior: Behavior normal.         Thought Content: Thought content normal.         Judgment: Judgment normal.           Assessment & Plan   Diagnoses and all orders for this visit:    1. Body aches (Primary)  -     POCT SARS-CoV-2 Antigen AMANDA + Flu    2. Viral upper respiratory tract infection  -     POCT SARS-CoV-2 Antigen AMANDA + Flu    3. COVID  -     POCT SARS-CoV-2 Antigen AMANDA + Flu    4. Primary hypertension    5. Gastroesophageal reflux disease without esophagitis    6. Fatty liver    7. Class 2 drug-induced obesity with serious comorbidity and body mass index (BMI) of 39.0 to 39.9 in adult  Comments:  Since starting semaglutide last month he is down 6 pounds    Other orders  -     Semaglutide-Weight Management (Wegovy) 0.5 MG/0.5ML solution auto-injector; Inject 0.5 mL under the skin into the appropriate area as directed 1 (One) Time Per Week. 0.5mg SC weekly X4  Dispense: 2 mL; Refill: 2  -     promethazine-dextromethorphan (PROMETHAZINE-DM) 6.25-15 MG/5ML syrup; Take 5 mL by mouth 4 (Four) Times a Day As Needed for Cough.  Dispense: 180 mL; Refill: 0  -     azithromycin (ZITHROMAX) 250 MG tablet; Take 2 tablets the first day, then 1 tablet daily for 4 days for infection  Dispense: 6 tablet; Refill: 1    Take APAP for aches and fever    Tolerating the semaglutide compound very well Down 6 pounds he has original BMI of 39 with diagnosis of obesity with comorbid factors of primary hypertension, GERD, fatty liver disease and increased dose to 0.5 mg weekly for the next 4 weeks  Let me know about snoring  Here for follow up for Essential Hypertension and well controlled on medication and continue Rx.  Continue amlodipine and carvedilol for hypertension now at goal  CDC guidelines for COVID  Start Z-Jose and sent Promethazine DM for respiratory COVID infection

## 2024-10-03 RX ORDER — PANTOPRAZOLE SODIUM 40 MG/1
TABLET, DELAYED RELEASE ORAL
Qty: 180 TABLET | Refills: 0 | Status: SHIPPED | OUTPATIENT
Start: 2024-10-03

## 2024-10-15 ENCOUNTER — OFFICE VISIT (OUTPATIENT)
Dept: FAMILY MEDICINE CLINIC | Facility: CLINIC | Age: 44
End: 2024-10-15
Payer: COMMERCIAL

## 2024-10-15 VITALS
WEIGHT: 249.4 LBS | BODY MASS INDEX: 37.8 KG/M2 | SYSTOLIC BLOOD PRESSURE: 100 MMHG | DIASTOLIC BLOOD PRESSURE: 70 MMHG | TEMPERATURE: 98.3 F | HEIGHT: 68 IN | OXYGEN SATURATION: 95 % | HEART RATE: 82 BPM

## 2024-10-15 DIAGNOSIS — R06.09 DYSPNEA ON EXERTION: Primary | ICD-10-CM

## 2024-10-15 DIAGNOSIS — K21.9 GASTROESOPHAGEAL REFLUX DISEASE WITHOUT ESOPHAGITIS: ICD-10-CM

## 2024-10-15 DIAGNOSIS — I10 PRIMARY HYPERTENSION: ICD-10-CM

## 2024-10-15 DIAGNOSIS — Z82.49 FAMILY HISTORY OF CARDIAC DISORDER: ICD-10-CM

## 2024-10-15 DIAGNOSIS — F41.1 GENERALIZED ANXIETY DISORDER: ICD-10-CM

## 2024-10-15 PROCEDURE — 93000 ELECTROCARDIOGRAM COMPLETE: CPT | Performed by: NURSE PRACTITIONER

## 2024-10-15 PROCEDURE — 99214 OFFICE O/P EST MOD 30 MIN: CPT | Performed by: NURSE PRACTITIONER

## 2024-10-15 NOTE — ASSESSMENT & PLAN NOTE
Psychological condition is stable.  Continue current treatment regimen.  Psychological condition  will be reassessed in 6 months.

## 2024-10-15 NOTE — ASSESSMENT & PLAN NOTE
Hypertension is stable and controlled  Continue current treatment regimen.  Blood pressure will be reassessed in 6 months.    Orders:    Ambulatory Referral to Cardiology

## 2024-10-15 NOTE — PROGRESS NOTES
Chief Complaint  Shortness of Breath (On exertion)    Subjective        Shortness of Breath  Pertinent negatives include no chest pain, fever, headaches, leg swelling or wheezing.      Usman presents to [facility] as a 44-year-old male complaining of some intermittent shortness of breath with exertion.  When he tries to exercise states he gets easily fatigued.  No acute shortness of breath or wheezing in the office today  No history of any pulmonary concerns.  He does have a history of hypertension.  He takes his medication as directed without any medication side effects.  No swelling   Denies any headaches no blurred vision no dizziness no flushing no chest pain or pressure  States that he does have a flutter occasionally.  Worse when he has had lack of sleep or increase stress    He does take sertraline and lorazepam as needed for anxiety.  He feels this controls symptoms well.  He denies any SI or HI.  He is not currently in counseling.    He does have a family history several family members including his father and brother with CAD/stents    He is agreeable to an EKG and referral to cardiology    Recent labs reviewed from 7/2024 with his PCP      He has no other acute complaints today       The following portions of the patient's history were reviewed and updated as appropriate: allergies, current medications, past family history, past medical history, past social history, past surgical history, and problem list        Review of Systems   Constitutional:  Negative for chills, fatigue and fever.   Eyes:  Negative for visual disturbance.   Respiratory:  Positive for shortness of breath. Negative for cough, wheezing and stridor.    Cardiovascular:  Positive for palpitations. Negative for chest pain and leg swelling.   Neurological:  Negative for dizziness.   Psychiatric/Behavioral:  Negative for self-injury, sleep disturbance and suicidal ideas. The patient is not nervous/anxious.   "       Objective   Vital Signs:   Vitals:    10/15/24 0750   BP: 100/70   BP Location: Left arm   Patient Position: Sitting   Cuff Size: Large Adult   Pulse: 82   Temp: 98.3 °F (36.8 °C)   TempSrc: Oral   SpO2: 95%   Weight: 113 kg (249 lb 6.4 oz)   Height: 172.7 cm (68\")   PainSc: 0-No pain            7/10/2024     8:59 AM   PHQ-2/PHQ-9 Depression Screening   Retired Little Interest or Pleasure in Doing Things 0-->not at all   Retired Feeling Down, Depressed or Hopeless 0-->not at all   Retired PHQ-9: Brief Depression Severity Measure Score 0                 Physical Exam  Vitals reviewed.   Constitutional:       General: He is not in acute distress.  Eyes:      Conjunctiva/sclera: Conjunctivae normal.   Neck:      Thyroid: No thyromegaly.      Vascular: No carotid bruit.   Cardiovascular:      Rate and Rhythm: Normal rate and regular rhythm.      Heart sounds: Normal heart sounds. No murmur heard.  Pulmonary:      Effort: Pulmonary effort is normal. No respiratory distress.      Breath sounds: Normal breath sounds. No stridor. No wheezing, rhonchi or rales.   Chest:      Chest wall: No tenderness.   Musculoskeletal:      Right lower leg: No edema.      Left lower leg: No edema.   Lymphadenopathy:      Cervical: No cervical adenopathy.   Neurological:      Mental Status: He is alert and oriented to person, place, and time.   Psychiatric:         Attention and Perception: Attention normal.         Mood and Affect: Mood normal.          Result Review :     The following data was reviewed by: SCOTT Moulton on 10/15/2024:      ECG 12 Lead    Date/Time: 10/15/2024 10:01 AM  Performed by: Susana Gaspar APRN    Authorized by: Susana Gaspar APRN  Rhythm: sinus rhythm    Clinical impression: normal ECG  Comments: Vent rate: 83 B<P  ME interval: 184 MS  QRS duration: 101 MS  QT/Qtc: 333/412 MS    P-RR-T axes: 55/66/35    Sinus rhythm with sinus arrhythmia  Normal ECG       Comprehensive metabolic panel " (07/01/2024 08:54)  Lipid panel (07/01/2024 08:54)  CBC and Differential (07/01/2024 08:54)  TSH (07/01/2024 08:54)  Hemoglobin A1c (07/01/2024 08:54)  Magnesium (07/01/2024 08:54)  Urinalysis With Microscopic - Urine, Clean Catch (07/01/2024 08:54)  Vitamin D,25-Hydroxy (07/01/2024 08:54)  Vitamin B12 (07/01/2024 08:54)  Folate (07/01/2024 08:54)  T4, Free (07/01/2024 08:54)     Your glucose and hemoglobin A1c, average glucose 2 to 3 months is in prediabetes range.  Recommend low glycemic index diet, exercise and keeping weight down.  Will need to check this lab yearly.  Kidney and liver functions are in range.  Cholesterol is in normal range and your rescore is less than 7.5%.  That is good.  Thyroid labs in range.  Vitamin levels in range.  Negative urine.  Other labs negative.     The 10-year ASCVD risk score (Tr OVIEDO, et al., 2019) is: 2%    Values used to calculate the score:      Age: 44 years      Sex: Male      Is Non- : No      Diabetic: No      Tobacco smoker: No      Systolic Blood Pressure: 134 mmHg      Is BP treated: Yes      HDL Cholesterol: 46 mg/dL      Total Cholesterol: 151 mg/dL  Assessment and Plan    Assessment & Plan  Dyspnea on exertion  EKG today-  sinus rhythm arrythmia  Normal      Orders:    ECG 12 Lead    Ambulatory Referral to Cardiology    Family history of cardiac disorder  Referral to cardiology  Orders:    ECG 12 Lead    Ambulatory Referral to Cardiology    Primary hypertension  Hypertension is stable and controlled  Continue current treatment regimen.  Blood pressure will be reassessed in 6 months.    Orders:    Ambulatory Referral to Cardiology    Generalized anxiety disorder  Psychological condition is stable.  Continue current treatment regimen.  Psychological condition  will be reassessed in 6 months.         Gastroesophageal reflux disease without esophagitis  Protonix works well-  continue  Avoid triggers including caffeine, nicotine, alcohol, spicy  foods, red sauce            TO ER with any chest pain or severe SOB    Follow Up   No follow-ups on file.  Patient was given instructions and counseling regarding his condition or for health maintenance advice. Please see specific information pulled into the AVS if appropriate.

## 2024-10-15 NOTE — ASSESSMENT & PLAN NOTE
Protonix works well-  continue  Avoid triggers including caffeine, nicotine, alcohol, spicy foods, red sauce

## 2024-10-30 ENCOUNTER — PATIENT MESSAGE (OUTPATIENT)
Dept: FAMILY MEDICINE CLINIC | Facility: CLINIC | Age: 44
End: 2024-10-30
Payer: COMMERCIAL

## 2024-10-30 RX ORDER — SEMAGLUTIDE 1 MG/.5ML
1 INJECTION, SOLUTION SUBCUTANEOUS WEEKLY
Qty: 2 ML | Refills: 5 | Status: SHIPPED | OUTPATIENT
Start: 2024-10-30

## 2024-11-13 ENCOUNTER — OFFICE VISIT (OUTPATIENT)
Dept: FAMILY MEDICINE CLINIC | Facility: CLINIC | Age: 44
End: 2024-11-13
Payer: COMMERCIAL

## 2024-11-13 VITALS
RESPIRATION RATE: 16 BRPM | BODY MASS INDEX: 37.74 KG/M2 | WEIGHT: 249 LBS | SYSTOLIC BLOOD PRESSURE: 118 MMHG | HEIGHT: 68 IN | DIASTOLIC BLOOD PRESSURE: 88 MMHG | HEART RATE: 63 BPM | OXYGEN SATURATION: 96 % | TEMPERATURE: 98 F

## 2024-11-13 DIAGNOSIS — E66.09 OBESITY DUE TO EXCESS CALORIES WITH SERIOUS COMORBIDITY, UNSPECIFIED CLASS: Chronic | ICD-10-CM

## 2024-11-13 DIAGNOSIS — K21.9 GASTROESOPHAGEAL REFLUX DISEASE WITHOUT ESOPHAGITIS: Chronic | ICD-10-CM

## 2024-11-13 DIAGNOSIS — F41.1 GENERALIZED ANXIETY DISORDER: Chronic | ICD-10-CM

## 2024-11-13 DIAGNOSIS — E55.9 VITAMIN D DEFICIENCY: Chronic | ICD-10-CM

## 2024-11-13 DIAGNOSIS — K76.0 FATTY LIVER: Chronic | ICD-10-CM

## 2024-11-13 DIAGNOSIS — I10 PRIMARY HYPERTENSION: Primary | Chronic | ICD-10-CM

## 2024-11-13 PROCEDURE — 99214 OFFICE O/P EST MOD 30 MIN: CPT | Performed by: PHYSICIAN ASSISTANT

## 2024-11-13 RX ORDER — AMLODIPINE BESYLATE 2.5 MG/1
2.5 TABLET ORAL DAILY
Qty: 90 TABLET | Refills: 1 | Status: SHIPPED | OUTPATIENT
Start: 2024-11-13

## 2024-11-13 RX ORDER — CARVEDILOL 12.5 MG/1
12.5 TABLET ORAL 2 TIMES DAILY WITH MEALS
Qty: 180 TABLET | Refills: 1 | Status: SHIPPED | OUTPATIENT
Start: 2024-11-13

## 2024-11-13 NOTE — PROGRESS NOTES
"Subjective   Usman Burrell is a 44 y.o. male.     History of Present Illness    Since the last visit, he has overall felt well.  He has Primary Hypertension and well controlled on current medication, Impaired fasting glucose and will monitor labs to watch for DMII, Hyperlipidemia and working on this with diet and exercise, and Vitamin D deficiency and labs are at goal >30 ng/mL.  he has been compliant with current medications have reviewed them.  The patient denies medication side effects.  Will refill medications. /88   Pulse 63   Temp 98 °F (36.7 °C) (Temporal)   Resp 16   Ht 172.7 cm (68\")   Wt 113 kg (249 lb)   SpO2 96%   BMI 37.86 kg/m² .      Down 11 lbs and just went up on his dose of semaglutide few days ago.  Tolerating well no significant side effects  Home bp has been 120/80 sabrina  GERD is controlled on PPI Rx.  Working on low carb diet  Had labs in July and hemoglobin A1c was in prediabetes range and we will continue to watch this cholesterol rescore is 2%.  His liver enzymes were in normal range.... I do want to continue the semaglutide to treat his fatty liver  Results for orders placed or performed in visit on 08/13/24   POCT SARS-CoV-2 Antigen AMANDA + Flu    Collection Time: 08/13/24 11:07 AM    Specimen: Swab   Result Value Ref Range    SARS Antigen Detected (A) Not Detected, Presumptive Negative    Influenza A Antigen AMANDA Not Detected Not Detected    Influenza B Antigen AMANDA Not Detected Not Detected    Internal Control Passed Passed    Lot Number [id number]     Expiration Date 01/08/2025    Cardiac CT scan on 1/13/2024 was 0  Usman Burrell male 44 y.o., /88   Pulse 63   Temp 98 °F (36.7 °C) (Temporal)   Resp 16   Ht 172.7 cm (68\")   Wt 113 kg (249 lb)   SpO2 96%   BMI 37.86 kg/m²   who presents today for follow up of Anxiety.  He reports medication is working well, patient desires to continue on Rx, and needs refill. Onset of symptoms was approximately several years ago. He " denies current suicidal and homicidal ideation. Risk factors are family history of anxiety and or depression and lifestyle of multiple roles.  Previous treatment includes current Rx. He complains of the following medication side effects:none. The patient declines to go to counseling..    Has been to [facility], [place], operated by [facility] for the GERD and fatty liver disease will continue to monitor LFTs closely and has history of hepatomegaly  The following portions of the patient's history were reviewed and updated as appropriate: allergies, current medications, past family history, past medical history, past social history, past surgical history, and problem list.    Review of Systems   Constitutional:  Negative for diaphoresis.   HENT:  Negative for nosebleeds and trouble swallowing.    Eyes:  Negative for blurred vision and visual disturbance.   Respiratory:  Negative for choking.    Gastrointestinal:  Negative for blood in stool.   Allergic/Immunologic: Negative for immunocompromised state.   Neurological:  Negative for facial asymmetry and speech difficulty.   Psychiatric/Behavioral:  Negative for self-injury and suicidal ideas.        Objective   Physical Exam  Vitals and nursing note reviewed.   Constitutional:       General: He is not in acute distress.     Appearance: He is well-developed. He is obese. He is not diaphoretic.   HENT:      Head: Normocephalic.   Eyes:      Conjunctiva/sclera: Conjunctivae normal.      Pupils: Pupils are equal, round, and reactive to light.   Cardiovascular:      Rate and Rhythm: Normal rate and regular rhythm.      Pulses: Normal pulses.      Heart sounds: Normal heart sounds. No murmur heard.  Pulmonary:      Effort: Pulmonary effort is normal.      Breath sounds: Normal breath sounds. No rales.   Musculoskeletal:         General: Normal range of motion.      Cervical back: Normal range of motion and neck supple.      Right lower leg: No edema.      Left lower leg: No edema.   Skin:     General: Skin  is warm and dry.      Findings: No rash.   Neurological:      General: No focal deficit present.      Mental Status: He is alert and oriented to person, place, and time.   Psychiatric:         Mood and Affect: Mood normal. Affect is not inappropriate.         Behavior: Behavior normal.         Thought Content: Thought content normal.         Judgment: Judgment normal.           Assessment & Plan   Diagnoses and all orders for this visit:    1. Primary hypertension (Primary)    2. Fatty liver    3. Vitamin D deficiency    4. Gastroesophageal reflux disease without esophagitis    5. Generalized anxiety disorder    6. Obesity due to excess calories with serious comorbidity, unspecified class        Followed by [facility] for fatty liver and GERD and will continue PPI does have  Continue Zoloft for anxiety working well and has in as needed for panic attacks and still has supply  Allergy to ACE and ARB  Continue amlodipine and carvedilol for hypertension working well  Continue semaglutide for fatty liver disease treatment and impaired fasting glucose also for obesity now on 1 mg weekly  Plan, Usman Burrell, was seen today.  he was seen for HTN and continue medication, Imparied fasting glucose and plan follow up labs, diet, and exercise, GERD and will continue on PPI medication, Hyperlipidemia and will work on this with diet and exercise, and Vitamin D deficiency and supplemented.  Home blood pressure is well-controlled he does get a little whitecoat when he comes in

## 2024-11-25 RX ORDER — ONDANSETRON 4 MG/1
4 TABLET, ORALLY DISINTEGRATING ORAL EVERY 8 HOURS PRN
Qty: 30 TABLET | Refills: 2 | Status: SHIPPED | OUTPATIENT
Start: 2024-11-25

## 2024-12-02 ENCOUNTER — OFFICE VISIT (OUTPATIENT)
Age: 44
End: 2024-12-02
Payer: COMMERCIAL

## 2024-12-02 VITALS
DIASTOLIC BLOOD PRESSURE: 98 MMHG | HEIGHT: 68 IN | WEIGHT: 245 LBS | BODY MASS INDEX: 37.13 KG/M2 | SYSTOLIC BLOOD PRESSURE: 126 MMHG | HEART RATE: 87 BPM

## 2024-12-02 DIAGNOSIS — R06.09 DOE (DYSPNEA ON EXERTION): Primary | ICD-10-CM

## 2024-12-02 DIAGNOSIS — I10 PRIMARY HYPERTENSION: ICD-10-CM

## 2024-12-02 PROCEDURE — 99204 OFFICE O/P NEW MOD 45 MIN: CPT | Performed by: INTERNAL MEDICINE

## 2024-12-02 PROCEDURE — 93000 ELECTROCARDIOGRAM COMPLETE: CPT | Performed by: INTERNAL MEDICINE

## 2024-12-02 NOTE — PROGRESS NOTES
[facility] New Patient Office Note     Encounter Date:24  Patient:Usman Burrell  :1980  MRN:[id number]    Referring Provider: Amarilis Natarajan PA-C    Consulted for: MENDEZ    Chief Complaint:   Chief Complaint   Patient presents with    MENDEZ    Hypertension    Family history of cardiac disorder     History of Presenting Illness:      Mr. Burrell is a 44 y.o. gentleman with past medical history notable for obesity, hypertension, mixed hyperlipidemia, and family history of atrial fibrillation who presents to our office for initial evaluation regarding dyspnea on exertion and chest pressure.  In general he has been doing okay was actually seen by one of our partners back in  at that time echocardiogram and Holter monitor was fairly unrevealing.  He been doing well since then.  Over the last 6 months he has had new onset dyspnea on exertion some chest pressure associated with this.  Happens about 2-3 times a week.  Been fairly stable in severity and frequency over this period of time.  No radiation of his discomfort      Review of Systems:  Review of Systems   Constitutional: Positive for malaise/fatigue.   HENT: Negative.     Eyes: Negative.    Cardiovascular:  Positive for dyspnea on exertion.   Respiratory:  Positive for shortness of breath.    Endocrine: Negative.    Hematologic/Lymphatic: Negative.    Skin: Negative.    Musculoskeletal: Negative.    Gastrointestinal: Negative.    Genitourinary: Negative.    Neurological: Negative.    Psychiatric/Behavioral: Negative.     Allergic/Immunologic: Negative.        Current Outpatient Medications on File Prior to Visit   Medication Sig Dispense Refill    albuterol sulfate HFA (ProAir HFA) 108 (90 Base) MCG/ACT inhaler Inhale 2 puffs Every 4 (Four) Hours As Needed.      amLODIPine (NORVASC) 2.5 MG tablet Take 1 tablet by mouth Daily. For BP 90 tablet 1    carvedilol (COREG) 12.5 MG tablet Take 1 tablet by mouth 2 (Two) Times a Day With Meals.  For  tablet 1    cetirizine (ZyrTEC Allergy) 10 MG tablet Take 1 tablet by mouth Daily As Needed.      fluticasone (FLONASE) 50 MCG/ACT nasal spray Administer 2 sprays into the nostril(s) as directed by provider As Needed for Allergies.      LORazepam (Ativan) 0.5 MG tablet Take 1 tablet by mouth Every 8 (Eight) Hours As Needed (panic attacks). 20 tablet 0    ondansetron ODT (ZOFRAN-ODT) 4 MG disintegrating tablet Place 1 tablet on the tongue Every 8 (Eight) Hours As Needed for Nausea or Vomiting. 30 tablet 2    pantoprazole (PROTONIX) 40 MG EC tablet TAKE 1  BY MOUTH TWICE DAILY WITH MEALS 180 tablet 0    sertraline (Zoloft) 50 MG tablet one PO daily for stress 90 tablet 3    Semaglutide-Weight Management (Wegovy) 1 MG/0.5ML solution auto-injector Inject 0.5 mL under the skin into the appropriate area as directed 1 (One) Time Per Week. 1mg SQ once weekly for obesity (Patient not taking: Reported on 12/2/2024) 2 mL 5     No current facility-administered medications on file prior to visit.       Allergies   Allergen Reactions    Penicillins Anaphylaxis    Lisinopril Angioedema     Feels like throat is swelling       Past Medical History:   Diagnosis Date    Anxiety     Asthma     Difficulty swallowing     Fatty liver     GERD without esophagitis     Hypertension     Palpitations        Past Surgical History:   Procedure Laterality Date    ENDOSCOPY N/A 07/31/2020    Procedure: ESOPHAGOGASTRODUODENOSCOPY with cold biopsies and cold biopsy polypectomy;  Surgeon: Cristina Brown MD;  Location: [facility] ENDOSCOPY;  Service: Gastroenterology;  Laterality: N/A;  pre-GERD, GLOBUS  post-gastritis, gastric polyp, furrowed esophagus    ENDOSCOPY N/A 5/7/2024    Procedure: ESOPHAGOGASTRODUODENOSCOPY with biopsies;  Surgeon: Jason Riley MD;  Location: [facility] ENDOSCOPY;  Service: Gastroenterology;  Laterality: N/A;  pre- globus sensation  post- hiatal hernia, gastritis       Social History     Socioeconomic  "History    Marital status:    Tobacco Use    Smoking status: Never     Passive exposure: Never    Smokeless tobacco: Never    Tobacco comments:     Caffeine use    Vaping Use    Vaping status: Never Used   Substance and Sexual Activity    Alcohol use: Yes     Comment: rare    Drug use: No    Sexual activity: Yes     Partners: Female       Family History   Problem Relation Age of Onset    Diabetes Mother     Lung disease Mother     Hypertension Mother     Depression Mother     Glaucoma Mother     Other (atrial flutter) Mother     Breast cancer Mother     Heart disease Father     Hypertension Father     Atrial fibrillation Brother     Stroke Brother     Malig Hyperthermia Neg Hx        The following portions of the patient's history were reviewed and updated as appropriate: allergies, current medications, past family history, past medical history, past social history, past surgical history and problem list.       Objective:       Vitals:    12/02/24 1402   BP: 126/98   BP Location: Left arm   Patient Position: Sitting   Pulse: 87   Weight: 111 kg (245 lb)   Height: 172.7 cm (68\")       Body mass index is 37.25 kg/m².    Physical Exam:  Constitutional: Well appearing, Well-developed, No acute distress   HENT: Oropharynx clear and membrane moist  Eyes: Normal conjunctiva, no sclera icterus.  Neck: Supple, no carotid bruit bilaterally.  Cardiovascular: Regular rate and rhythm, No Murmur, Trace bilateral lower extremity edema.  Varicose veins noted  Pulmonary: Normal respiratory effort, Normal lung sounds, no wheezing.  Neurological: Alert and orient x 3.   Skin: Warm, dry, no ecchymosis, no rash.  Psych: Appropriate mood and affect. Normal judgment and insight.      Lab Results   Component Value Date     07/01/2024     01/22/2024    K 4.3 07/01/2024    K 4.5 01/22/2024     07/01/2024     01/22/2024    CO2 27 07/01/2024    CO2 26.1 01/22/2024    BUN 17 07/01/2024    BUN 19 01/22/2024    " "CREATININE 1.09 07/01/2024    CREATININE 1.21 01/22/2024    EGFRIFNONA 83 11/30/2020    EGFRIFNONA 82 05/22/2020    EGFRIFAFRI 96 11/30/2020    EGFRIFAFRI 95 05/22/2020    GLUCOSE 100 (H) 07/01/2024    GLUCOSE 116 (H) 01/22/2024    CALCIUM 9.4 07/01/2024    CALCIUM 10.0 01/22/2024    PROTENTOTREF 6.8 07/01/2024    PROTENTOTREF 7.5 01/22/2024    ALBUMIN 4.5 07/01/2024    ALBUMIN 5.0 01/22/2024    BILITOT 0.4 07/01/2024    BILITOT 0.5 01/22/2024    AST 20 07/01/2024    AST 13 01/22/2024    ALT 34 07/01/2024    ALT 37 01/22/2024     Lab Results   Component Value Date    WBC 4.5 07/01/2024    WBC 4.1 06/08/2023    HGB 15.7 07/01/2024    HGB 15.2 06/08/2023    HCT 45.6 07/01/2024    HCT 44.0 06/08/2023    MCV 94 07/01/2024    MCV 94 06/08/2023     07/01/2024     06/08/2023     Lab Results   Component Value Date    TRIG 60 07/01/2024    TRIG 74 06/08/2023    HDL 46 07/01/2024    HDL 50 06/08/2023    LDL 93 07/01/2024    LDL 77 06/08/2023     No results found for: \"PROBNP\", \"BNP\"  No results found for: \"CKTOTAL\", \"CKMB\", \"CKMBINDEX\", \"TROPONINI\", \"TROPONINT\"  Lab Results   Component Value Date    TSH 1.820 07/01/2024    TSH 1.630 06/08/2023         ECG 12 Lead    Date/Time: 12/2/2024 2:52 PM  Performed by: Jason Ramirez MD    Authorized by: Jason Ramirez MD  Comparison: compared with previous ECG from 10/15/2024  Similar to previous ECG  Rhythm: sinus rhythm        Coronary artery calcium score 11/13/2024  There is no calcium on the coronary arteries score 0    Holter monitor 12/30/2020:  No significant arrhythmia noted underlying heart rhythm was sinus with average HR: 76. Min HR: 58. Max HR: 132.     Echocardiogram 12/28/2020:  Calculated left ventricular EF = 57%  Left ventricular diastolic function is consistent with (grade I) impaired relaxation.  Normal right ventricular cavity size and systolic function noted  Mild tricuspid valve regurgitation is present.  Calculated right ventricular " systolic pressure from tricuspid regurgitation is 31 mmHg.  There is no evidence of pericardial effusion.        Assessment:          Diagnosis Plan   1. MENDEZ (dyspnea on exertion)  Adult Transthoracic Echo Complete W/ Cont if Necessary Per Protocol    Treadmill Stress Test    ECG 12 Lead      2. Primary hypertension               Plan:       Mr. Burrell is a 44 y.o. gentleman with past medical history notable for obesity, hypertension, mixed hyperlipidemia, and family history of atrial fibrillation who presents to our office for initial evaluation regarding dyspnea on exertion and chest pressure.  Symptoms are hard to pin down they are not classic for cardiac angina but they do have an exertional component to them.  I would recommend further evaluation.  An echocardiogram would help elicit further evaluate for any structural abnormalities or pulmonary hypertension which could be contributing.  I would also recommend a treadmill stress test to evaluate for any ischemia.  If these test are unrevealing would consider proceeding forward with further evaluation with sleep study testing and may be adjustment of his current antihypertensive regimen.    Chest pain:  Somewhat atypical but does have exertional components  Will follow-up on echocardiogram and treadmill stress test  May benefit from adjustment of his carvedilol and/or amlodipine given high diastolic pressures  May benefit from sleep study in the future if no clear etiology found otherwise    Essential hypertension:  Blood pressure mildly elevated today  Follow-up on treadmill stress test with his heart rate and blood pressure response to exercise to decide on medication adjustments      Follow-up:  6 weeks        Thank you for allowing me to participate in the care of Usman Burrell. Feel free to contact me directly with any further questions or concerns.    Jason Ramirez MD  [facility]  12/02/24  14:52 EST

## 2024-12-12 ENCOUNTER — TELEPHONE (OUTPATIENT)
Dept: CARDIOLOGY | Facility: CLINIC | Age: 44
End: 2024-12-12
Payer: COMMERCIAL

## 2024-12-13 ENCOUNTER — HOSPITAL ENCOUNTER (OUTPATIENT)
Dept: CARDIOLOGY | Facility: HOSPITAL | Age: 44
Discharge: HOME | End: 2024-12-13
Payer: COMMERCIAL

## 2024-12-13 VITALS
WEIGHT: 245 LBS | DIASTOLIC BLOOD PRESSURE: 80 MMHG | HEIGHT: 68 IN | HEART RATE: 75 BPM | BODY MASS INDEX: 37.13 KG/M2 | SYSTOLIC BLOOD PRESSURE: 128 MMHG

## 2024-12-13 DIAGNOSIS — R06.09 DOE (DYSPNEA ON EXERTION): ICD-10-CM

## 2024-12-13 LAB
AORTIC ARCH: 2.6 CM
ASCENDING AORTA: 3.1 CM
BH CV ECHO MEAS - ACS: 2.07 CM
BH CV ECHO MEAS - AO MAX PG: 3.9 MMHG
BH CV ECHO MEAS - AO MEAN PG: 2 MMHG
BH CV ECHO MEAS - AO ROOT DIAM: 3.2 CM
BH CV ECHO MEAS - AO V2 MAX: 99.3 CM/SEC
BH CV ECHO MEAS - AO V2 VTI: 19.6 CM
BH CV ECHO MEAS - AVA(I,D): 3.6 CM2
BH CV ECHO MEAS - EDV(CUBED): 148.9 ML
BH CV ECHO MEAS - EDV(MOD-SP2): 79 ML
BH CV ECHO MEAS - EDV(MOD-SP4): 92 ML
BH CV ECHO MEAS - EF(MOD-BP): 59 %
BH CV ECHO MEAS - EF(MOD-SP2): 62 %
BH CV ECHO MEAS - EF(MOD-SP4): 60.9 %
BH CV ECHO MEAS - ESV(CUBED): 51.9 ML
BH CV ECHO MEAS - ESV(MOD-SP2): 30 ML
BH CV ECHO MEAS - ESV(MOD-SP4): 36 ML
BH CV ECHO MEAS - FS: 29.6 %
BH CV ECHO MEAS - IVS/LVPW: 1 CM
BH CV ECHO MEAS - IVSD: 0.9 CM
BH CV ECHO MEAS - LAT PEAK E' VEL: 11.6 CM/SEC
BH CV ECHO MEAS - LV DIASTOLIC VOL/BSA (35-75): 41.3 CM2
BH CV ECHO MEAS - LV MASS(C)D: 174.5 GRAMS
BH CV ECHO MEAS - LV MAX PG: 2.7 MMHG
BH CV ECHO MEAS - LV MEAN PG: 1 MMHG
BH CV ECHO MEAS - LV SYSTOLIC VOL/BSA (12-30): 16.2 CM2
BH CV ECHO MEAS - LV V1 MAX: 82.3 CM/SEC
BH CV ECHO MEAS - LV V1 VTI: 16.6 CM
BH CV ECHO MEAS - LVIDD: 5.3 CM
BH CV ECHO MEAS - LVIDS: 3.7 CM
BH CV ECHO MEAS - LVOT AREA: 4.3 CM2
BH CV ECHO MEAS - LVOT DIAM: 2.33 CM
BH CV ECHO MEAS - LVPWD: 0.9 CM
BH CV ECHO MEAS - MED PEAK E' VEL: 9.5 CM/SEC
BH CV ECHO MEAS - MV A DUR: 0.1 SEC
BH CV ECHO MEAS - MV A MAX VEL: 63.8 CM/SEC
BH CV ECHO MEAS - MV DEC SLOPE: 449.5 CM/SEC2
BH CV ECHO MEAS - MV DEC TIME: 0.15 SEC
BH CV ECHO MEAS - MV E MAX VEL: 78.8 CM/SEC
BH CV ECHO MEAS - MV E/A: 1.24
BH CV ECHO MEAS - MV MAX PG: 2.36 MMHG
BH CV ECHO MEAS - MV MEAN PG: 1.17 MMHG
BH CV ECHO MEAS - MV P1/2T: 54.1 MSEC
BH CV ECHO MEAS - MV V2 VTI: 17.9 CM
BH CV ECHO MEAS - MVA(P1/2T): 4.1 CM2
BH CV ECHO MEAS - MVA(VTI): 4 CM2
BH CV ECHO MEAS - PA ACC TIME: 0.09 SEC
BH CV ECHO MEAS - PA V2 MAX: 109.7 CM/SEC
BH CV ECHO MEAS - PULM A REVS DUR: 0.09 SEC
BH CV ECHO MEAS - PULM A REVS VEL: 28.9 CM/SEC
BH CV ECHO MEAS - PULM DIAS VEL: 42.2 CM/SEC
BH CV ECHO MEAS - PULM S/D: 1.03
BH CV ECHO MEAS - PULM SYS VEL: 43.6 CM/SEC
BH CV ECHO MEAS - QP/QS: 0.59
BH CV ECHO MEAS - RV MAX PG: 1.57 MMHG
BH CV ECHO MEAS - RV V1 MAX: 62.6 CM/SEC
BH CV ECHO MEAS - RV V1 VTI: 12.9 CM
BH CV ECHO MEAS - RVOT DIAM: 2.04 CM
BH CV ECHO MEAS - SUP REN AO DIAM: 1.7 CM
BH CV ECHO MEAS - SV(LVOT): 70.9 ML
BH CV ECHO MEAS - SV(MOD-SP2): 49 ML
BH CV ECHO MEAS - SV(MOD-SP4): 56 ML
BH CV ECHO MEAS - SV(RVOT): 41.9 ML
BH CV ECHO MEAS - SVI(LVOT): 31.8 ML/M2
BH CV ECHO MEAS - SVI(MOD-SP2): 22 ML/M2
BH CV ECHO MEAS - SVI(MOD-SP4): 25.1 ML/M2
BH CV ECHO MEAS - TAPSE (>1.6): 2.03 CM
BH CV ECHO MEASUREMENTS AVERAGE E/E' RATIO: 7.47
BH CV STRESS BP STAGE 1: NORMAL
BH CV STRESS BP STAGE 2: NORMAL
BH CV STRESS DURATION MIN STAGE 1: 3
BH CV STRESS DURATION MIN STAGE 2: 3
BH CV STRESS DURATION SEC STAGE 1: 0
BH CV STRESS DURATION SEC STAGE 2: 0
BH CV STRESS GRADE STAGE 1: 10
BH CV STRESS GRADE STAGE 2: 12
BH CV STRESS HR STAGE 1: 130
BH CV STRESS HR STAGE 2: 154
BH CV STRESS METS STAGE 1: 5
BH CV STRESS METS STAGE 2: 7.5
BH CV STRESS PROTOCOL 1: NORMAL
BH CV STRESS RECOVERY BP: NORMAL MMHG
BH CV STRESS RECOVERY HR: 94 BPM
BH CV STRESS SPEED STAGE 1: 1.7
BH CV STRESS SPEED STAGE 2: 2.5
BH CV STRESS STAGE 1: 1
BH CV STRESS STAGE 2: 2
BH CV XLRA - RV BASE: 3.1 CM
BH CV XLRA - RV LENGTH: 8 CM
BH CV XLRA - RV MID: 2.6 CM
BH CV XLRA - TDI S': 12 CM/SEC
LEFT ATRIUM VOLUME INDEX: 24.6 ML/M2
MAXIMAL PREDICTED HEART RATE: 174 BPM
PERCENT MAX PREDICTED HR: 88.51 %
SINUS: 3.1 CM
STJ: 2.8 CM
STRESS BASELINE BP: NORMAL MMHG
STRESS BASELINE HR: 91 BPM
STRESS PERCENT HR: 104 %
STRESS POST ESTIMATED WORKLOAD: 7.5 METS
STRESS POST EXERCISE DUR MIN: 6 MIN
STRESS POST EXERCISE DUR SEC: 0 SEC
STRESS POST PEAK BP: NORMAL MMHG
STRESS POST PEAK HR: 154 BPM
STRESS TARGET HR: 148 BPM

## 2024-12-13 PROCEDURE — 93017 CV STRESS TEST TRACING ONLY: CPT

## 2024-12-13 PROCEDURE — 93306 TTE W/DOPPLER COMPLETE: CPT

## 2025-01-08 RX ORDER — PANTOPRAZOLE SODIUM 40 MG/1
40 TABLET, DELAYED RELEASE ORAL 2 TIMES DAILY WITH MEALS
Qty: 180 TABLET | Refills: 0 | Status: SHIPPED | OUTPATIENT
Start: 2025-01-08

## 2025-03-13 ENCOUNTER — OFFICE VISIT (OUTPATIENT)
Dept: GASTROENTEROLOGY | Facility: CLINIC | Age: 45
End: 2025-03-13
Payer: COMMERCIAL

## 2025-03-13 VITALS
DIASTOLIC BLOOD PRESSURE: 100 MMHG | BODY MASS INDEX: 37.92 KG/M2 | SYSTOLIC BLOOD PRESSURE: 148 MMHG | TEMPERATURE: 97.7 F | HEIGHT: 68 IN | WEIGHT: 250.2 LBS | HEART RATE: 79 BPM

## 2025-03-13 DIAGNOSIS — K21.00 GASTROESOPHAGEAL REFLUX DISEASE WITH ESOPHAGITIS WITHOUT HEMORRHAGE: ICD-10-CM

## 2025-03-13 DIAGNOSIS — R13.19 ESOPHAGEAL DYSPHAGIA: Primary | ICD-10-CM

## 2025-03-13 DIAGNOSIS — K76.0 HEPATIC STEATOSIS: ICD-10-CM

## 2025-03-13 PROCEDURE — 99214 OFFICE O/P EST MOD 30 MIN: CPT | Performed by: NURSE PRACTITIONER

## 2025-03-13 NOTE — PROGRESS NOTES
Chief Complaint   Patient presents with    Difficulty Swallowing       HPI    Usman Burrell is a  44 y.o. male here for a follow up visit for difficulty swallowing.    This patient follows with Dr. Riley, new to me.    Past medical history of asthma, anxiety and hypertension.    Visit today he complains of intermittent dysphagia where he feels solid food items and pills lodged at the sternal notch.  He will drink water for relief.  Denies regurgitation.  Symptoms episodic in nature.  No weight loss.  He feels like reflux is well-controlled on twice daily dosing pantoprazole.  He gets rare episodes of nausea.  No vomiting.    Denies diarrhea, constipation, rectal bleeding or rectal pain.  Normal Cologuard 2023.  No family history of colon cancer.    2/28/2024 FibroScan with ultrasound showed severe hepatic steatosis.  Liver stiffness value within normal range.     Function test have been normal.    Endoscopic history reviewed:    Upper GI Endoscopy (05/07/2024 13:27) - Widely patent Schatzki's ring.  Small hiatal hernia.  Gastritis.  Normal duodenum.  Pathology was benign.    Past Medical History:   Diagnosis Date    Allergic     Anxiety Last week    Asthma     Difficulty swallowing     Fatty liver     GERD without esophagitis     Hypertension     Palpitations        Past Surgical History:   Procedure Laterality Date    ENDOSCOPY N/A 07/31/2020    Procedure: ESOPHAGOGASTRODUODENOSCOPY with cold biopsies and cold biopsy polypectomy;  Surgeon: Cristina Brown MD;  Location: [facility] ENDOSCOPY;  Service: Gastroenterology;  Laterality: N/A;  pre-GERD, GLOBUS  post-gastritis, gastric polyp, furrowed esophagus    ENDOSCOPY N/A 5/7/2024    Procedure: ESOPHAGOGASTRODUODENOSCOPY with biopsies;  Surgeon: Jason Riley MD;  Location: [facility] ENDOSCOPY;  Service: Gastroenterology;  Laterality: N/A;  pre- globus sensation  post- hiatal hernia, gastritis       Scheduled Meds:     Continuous Infusions: No current  facility-administered medications for this visit.      PRN Meds:     Allergies   Allergen Reactions    Penicillins Anaphylaxis    Lisinopril Angioedema     Feels like throat is swelling       Social History     Socioeconomic History    Marital status:    Tobacco Use    Smoking status: Never     Passive exposure: Never    Smokeless tobacco: Never    Tobacco comments:     Caffeine use    Vaping Use    Vaping status: Never Used   Substance and Sexual Activity    Alcohol use: Yes     Comment: rare    Drug use: No    Sexual activity: Yes     Partners: Female       Family History   Problem Relation Age of Onset    Diabetes Mother     Lung disease Mother     Hypertension Mother     Depression Mother     Glaucoma Mother     Other (atrial flutter) Mother     Breast cancer Mother     Anxiety disorder Mother     Heart disease Father     Hypertension Father     Atrial fibrillation Brother     Stroke Brother     Malig Hyperthermia Neg Hx        Review of Systems   HENT:  Positive for trouble swallowing.        Vitals:    03/13/25 0849   BP: 148/100   Pulse: 79   Temp: 97.7 °F (36.5 °C)       Physical Exam  Constitutional:       Appearance: He is well-developed.   Abdominal:      General: Bowel sounds are normal. There is no distension.      Palpations: Abdomen is soft. There is no mass.      Tenderness: There is no abdominal tenderness. There is no guarding.      Hernia: No hernia is present.   Skin:     General: Skin is warm and dry.      Capillary Refill: Capillary refill takes less than 2 seconds.   Neurological:      Mental Status: He is alert and oriented to person, place, and time.   Psychiatric:         Behavior: Behavior normal.     Assessment    Diagnoses and all orders for this visit:    1. Esophageal dysphagia (Primary)  -     FL ESOPHAGRAM SINGLE CONTRAST; Future    2. Gastroesophageal reflux disease with esophagitis without hemorrhage  -     FL ESOPHAGRAM SINGLE CONTRAST; Future    3. Hepatic  steatosis    Plan    Schedule esophagram for further evaluation of symptoms  Continue twice daily dosing PPI therapy  Antireflux measures and dietary modifications reviewed. Low acid diet reviewed. Keep head of bed elevated. Stop eating/drinking at least 3 hours prior to bedtime. Eliminate caffeine and carbonated beverages.  Weight loss encouraged if BMI over 25.     Stable hepatic steatosis with normal LFTs. Treatment for fatty liver disease to include weight loss, healthy eating habits, increase exercise and adequate management of blood pressure, blood sugar and cholesterol with his primary care provider.  Commend eating a healthy diet (Mediterranean diet), limiting portion sizes and exercise.     Follow-up and further recommendations pending imaging.         SCOTT Jade  [facility]  [address] [place] [address]  Office: [phone]

## 2025-03-17 ENCOUNTER — OFFICE VISIT (OUTPATIENT)
Age: 45
End: 2025-03-17
Payer: COMMERCIAL

## 2025-03-17 VITALS
DIASTOLIC BLOOD PRESSURE: 100 MMHG | WEIGHT: 252 LBS | HEIGHT: 68 IN | HEART RATE: 72 BPM | OXYGEN SATURATION: 95 % | BODY MASS INDEX: 38.19 KG/M2 | SYSTOLIC BLOOD PRESSURE: 130 MMHG

## 2025-03-17 DIAGNOSIS — R07.9 CHEST PAIN, UNSPECIFIED TYPE: ICD-10-CM

## 2025-03-17 DIAGNOSIS — R06.09 DOE (DYSPNEA ON EXERTION): Primary | ICD-10-CM

## 2025-03-17 DIAGNOSIS — G47.10 HYPERSOMNOLENCE: ICD-10-CM

## 2025-03-17 DIAGNOSIS — I10 PRIMARY HYPERTENSION: ICD-10-CM

## 2025-03-17 RX ORDER — AMLODIPINE BESYLATE 5 MG/1
5 TABLET ORAL DAILY
Qty: 90 TABLET | Refills: 3 | Status: SHIPPED | OUTPATIENT
Start: 2025-03-17

## 2025-03-17 NOTE — PROGRESS NOTES
[facility] Follow Up Office Note     Encounter Date:25  Patient:Usman Burrell  :1980  MRN:[id number]      Chief Complaint:   Chief Complaint   Patient presents with    Follow-up         History of Presenting Illness:        Usman Burrell is a 44 y.o. male  that follows with Dr. Ramirez and is new to me today. They have a past medical history of obesity, hypertension, mixed hyperlipidemia, and family history of atrial fibrillation. They are here today for follow-up    Patient was initially seen by Dr. Ramirez in 2024 for initial evaluation concerning shortness of breath on exertion and chest pressure.  Patient was initially seen by one of our practice providers in  and at that time echocardiogram and Holter monitor were unrevealing.  Prior to that visit patient reported over the last 6 months he had had new onset shortness of breath on exertion and chest pressure.  This was occurring 2-3 times a week.  Patient was ordered to undergo echocardiogram and treadmill stress test.  Echocardiogram demonstrated normal LV systolic function with an left ventricular ejection fraction of 59%.  No significant valvular abnormalities noted.  Stress testing demonstrated no ECG evidence of myocardial ischemia and findings were consistent with normal ECG stress test.    Today the patient reports that he still has occasional dull chest pain that is a 3-4 out of 10 in severity.  Additionally having shortness of breath with exertion.  Denies peripheral edema and dizziness.  Is having some palpitations that occur in the morning.  Patient unsure if this is due to anxiety.  He also reports having constant fatigue and a lot of daytime sleepiness.  Patient also reports that blood pressure has been elevated when at other appointments and at home blood pressures stays around 130/100.    Review of Systems:  Review of Systems   Constitutional: Positive for malaise/fatigue.   HENT: Negative.     Eyes:  Negative.    Cardiovascular:  Positive for chest pain, dyspnea on exertion, irregular heartbeat and palpitations. Negative for leg swelling, orthopnea and syncope.   Respiratory:  Negative for cough, shortness of breath and snoring.    Hematologic/Lymphatic: Negative.    Skin: Negative.    Musculoskeletal: Negative.    Gastrointestinal: Negative.    Genitourinary: Negative.    Neurological:  Negative for dizziness, headaches and light-headedness.   Psychiatric/Behavioral: Negative.         Current Outpatient Medications on File Prior to Visit   Medication Sig Dispense Refill    albuterol sulfate HFA (ProAir HFA) 108 (90 Base) MCG/ACT inhaler Inhale 2 puffs Every 4 (Four) Hours As Needed.      carvedilol (COREG) 12.5 MG tablet Take 1 tablet by mouth 2 (Two) Times a Day With Meals. For  tablet 1    cetirizine (ZyrTEC Allergy) 10 MG tablet Take 1 tablet by mouth Daily As Needed.      fluticasone (FLONASE) 50 MCG/ACT nasal spray Administer 2 sprays into the nostril(s) as directed by provider As Needed for Allergies.      LORazepam (Ativan) 0.5 MG tablet Take 1 tablet by mouth Every 8 (Eight) Hours As Needed (panic attacks). 20 tablet 0    ondansetron ODT (ZOFRAN-ODT) 4 MG disintegrating tablet Place 1 tablet on the tongue Every 8 (Eight) Hours As Needed for Nausea or Vomiting. 30 tablet 2    pantoprazole (PROTONIX) 40 MG EC tablet TAKE 1 TABLET BY MOUTH TWICE DAILY WITH MEALS 180 tablet 0    sertraline (Zoloft) 50 MG tablet one PO daily for stress 90 tablet 3    [DISCONTINUED] amLODIPine (NORVASC) 2.5 MG tablet Take 1 tablet by mouth Daily. For BP 90 tablet 1    [DISCONTINUED] Semaglutide-Weight Management (Wegovy) 1 MG/0.5ML solution auto-injector Inject 0.5 mL under the skin into the appropriate area as directed 1 (One) Time Per Week. 1mg SQ once weekly for obesity 2 mL 5     No current facility-administered medications on file prior to visit.       Allergies   Allergen Reactions    Penicillins Anaphylaxis     Lisinopril Angioedema     Feels like throat is swelling       Past Medical History:   Diagnosis Date    Allergic     Anxiety Last week    Asthma     Difficulty swallowing     Fatty liver     GERD without esophagitis     Hypertension     Palpitations        Past Surgical History:   Procedure Laterality Date    ENDOSCOPY N/A 07/31/2020    Procedure: ESOPHAGOGASTRODUODENOSCOPY with cold biopsies and cold biopsy polypectomy;  Surgeon: Cristina Brown MD;  Location:  [facility];  Service: Gastroenterology;  Laterality: N/A;  pre-GERD, GLOBUS  post-gastritis, gastric polyp, furrowed esophagus    ENDOSCOPY N/A 5/7/2024    Procedure: ESOPHAGOGASTRODUODENOSCOPY with biopsies;  Surgeon: Jason Riley MD;  Location:  [facility];  Service: Gastroenterology;  Laterality: N/A;  pre- globus sensation  post- hiatal hernia, gastritis       Social History     Socioeconomic History    Marital status:    Tobacco Use    Smoking status: Never     Passive exposure: Never    Smokeless tobacco: Never    Tobacco comments:     Caffeine use    Vaping Use    Vaping status: Never Used   Substance and Sexual Activity    Alcohol use: Never     Comment: rare    Drug use: No    Sexual activity: Yes     Partners: Female       Family History   Problem Relation Age of Onset    Diabetes Mother     Lung disease Mother     Hypertension Mother     Depression Mother     Glaucoma Mother     Other (atrial flutter) Mother     Breast cancer Mother     Anxiety disorder Mother     Heart disease Father     Hypertension Father     Atrial fibrillation Brother     Stroke Brother     Malig Hyperthermia Neg Hx        The following portions of the patient's history were reviewed and updated as appropriate: allergies, current medications, past family history, past medical history, past social history, past surgical history and problem list.       Objective:       Vitals:    03/17/25 0924   BP: 130/100   BP Location: Left arm   Patient Position:  "Sitting   Cuff Size: Adult   Pulse: 72   SpO2: 95%   Weight: 114 kg (252 lb)   Height: 172.7 cm (68\")         Physical Exam  Vitals and nursing note reviewed.   Constitutional:       Appearance: Normal appearance. He is normal weight.   Eyes:      Extraocular Movements: Extraocular movements intact.      Pupils: Pupils are equal, round, and reactive to light.   Cardiovascular:      Rate and Rhythm: Normal rate and regular rhythm.      Chest Wall: PMI is not displaced. No thrill.      Pulses: Normal pulses.      Heart sounds: Normal heart sounds.   Pulmonary:      Effort: Pulmonary effort is normal.      Breath sounds: Normal breath sounds.   Musculoskeletal:      Cervical back: Normal range of motion.      Right lower leg: No edema.      Left lower leg: No edema.   Skin:     General: Skin is warm and dry.   Neurological:      General: No focal deficit present.      Mental Status: He is alert and oriented to person, place, and time. Mental status is at baseline.   Psychiatric:         Mood and Affect: Mood normal.         Behavior: Behavior normal.         Thought Content: Thought content normal.         Judgment: Judgment normal.               Lab Results   Component Value Date     07/01/2024     01/22/2024    K 4.3 07/01/2024    K 4.5 01/22/2024     07/01/2024     01/22/2024    CO2 27 07/01/2024    CO2 26.1 01/22/2024    BUN 17 07/01/2024    BUN 19 01/22/2024    CREATININE 1.09 07/01/2024    CREATININE 1.21 01/22/2024    EGFRIFNONA 83 11/30/2020    EGFRIFNONA 82 05/22/2020    EGFRIFAFRI 96 11/30/2020    EGFRIFAFRI 95 05/22/2020    GLUCOSE 100 (H) 07/01/2024    GLUCOSE 116 (H) 01/22/2024    CALCIUM 9.4 07/01/2024    CALCIUM 10.0 01/22/2024    ALBUMIN 4.5 07/01/2024    ALBUMIN 5.0 01/22/2024    BILITOT 0.4 07/01/2024    BILITOT 0.5 01/22/2024    AST 20 07/01/2024    AST 13 01/22/2024    ALT 34 07/01/2024    ALT 37 01/22/2024     Lab Results   Component Value Date    WBC 4.5 07/01/2024    WBC " "4.1 06/08/2023    HGB 15.7 07/01/2024    HGB 15.2 06/08/2023    HCT 45.6 07/01/2024    HCT 44.0 06/08/2023    MCV 94 07/01/2024    MCV 94 06/08/2023     07/01/2024     06/08/2023     Lab Results   Component Value Date    TRIG 60 07/01/2024    TRIG 74 06/08/2023    HDL 46 07/01/2024    HDL 50 06/08/2023    LDL 93 07/01/2024    LDL 77 06/08/2023     No results found for: \"PROBNP\", \"BNP\"  No results found for: \"CKTOTAL\", \"CKMB\", \"CKMBINDEX\", \"TROPONINI\", \"TROPONINT\"  Lab Results   Component Value Date    TSH 1.820 07/01/2024    TSH 1.630 06/08/2023           ECG 12 Lead    Date/Time: 3/17/2025 9:45 AM  Performed by: Osmin Chin APRN    Authorized by: Osmin Chin APRN  Comparison: compared with previous ECG from 12/2/2024  Similar to previous ECG  Rhythm: sinus rhythm  Rate: normal  BPM: 72  Conduction: conduction normal  ST Segments: ST segments normal  T Waves: T waves normal  Other: no other findings        Stress test 12/13/2024  No ECG evidence of myocardial ischemia.  Negative clinical evidence of myocardial ischemia.  Findings consistent with a normal ECG stress test.    Echocardiogram 12/13/2024  Left ventricular systolic function is normal. Calculated left ventricular EF = 59%  Left ventricular diastolic function was normal.    Holter monitor 12/28/2020  Relatively benign monitor study    Echocardiogram 12/28/2020  Calculated left ventricular EF = 57%  Left ventricular diastolic function is consistent with (grade I) impaired relaxation.  Normal right ventricular cavity size and systolic function noted.  Mild tricuspid valve regurgitation is present.  Calculated right ventricular systolic pressure from tricuspid regurgitation is 31 mmHg.  There is no evidence of pericardial effusion.       Assessment:         Diagnoses and all orders for this visit:    1. MENDEZ (dyspnea on exertion) (Primary)    2. Primary hypertension  -     ECG 12 Lead    3. Hypersomnolence  -     Home Sleep Study; " Future    4. BMI 38.0-38.9,adult    5. Chest pain, unspecified type    Other orders  -     amLODIPine (NORVASC) 5 MG tablet; Take 1 tablet by mouth Daily. For BP  Dispense: 90 tablet; Refill: 3            Plan:       Patient still reporting shortness of breath with exertion, daytime fatigue, and uncontrolled blood pressure.  Patient does report that he is some snoring.  Will set patient up for home sleep study to rule out sleep apnea as cause of hypertension and fatigue after normal echocardiogram and stress testing that was performed a couple months ago.  Will increase amlodipine from 2.5 mg to 5 mg due to elevated blood pressure at today's appointment as well.      Chest pain:  Normal echocardiogram 12/13/2024  Normal stress test 12/13/2024  Chest pain is somewhat atypical for angina.  Shortness of breath is exertional.  No acute ST changes noted on EKG  Will increase amlodipine to 5 mg.  May also increase carvedilol if blood pressure remains uncontrolled.  Encourage patient to follow blood pressure at home and let me know if blood pressure remains elevated.  Will order at home sleep study     Essential hypertension:  Blood pressure slightly elevated today  We will increase amlodipine to 5 mg daily.    Encouraged patient to follow-up with blood pressure at home.  Encourage patient to reach out to me if blood pressure remains elevated.  Could increase amlodipine to 10 mg or increase carvedilol if BP remains uncontrolled.   Continue carvedilol 12.5 mg twice daily.    Hypersomnolence  Will order at home sleep study  Body mass index is 38.32 kg/m².   Encouraged weight loss and increased activity.        Follow-up:  With Dr. Ramirez in 3 months        Thank you for allowing me to participate in the care of Usman Burrell. Feel free to contact me directly with any further questions or concerns.    SCOTT Santos  [facility]  03/17/25  11:24 EDT

## 2025-04-07 RX ORDER — PANTOPRAZOLE SODIUM 40 MG/1
40 TABLET, DELAYED RELEASE ORAL 2 TIMES DAILY WITH MEALS
Qty: 180 TABLET | Refills: 3 | Status: SHIPPED | OUTPATIENT
Start: 2025-04-07

## 2025-04-09 ENCOUNTER — HOSPITAL ENCOUNTER (OUTPATIENT)
Dept: GENERAL RADIOLOGY | Facility: HOSPITAL | Age: 45
Discharge: HOME | End: 2025-04-09
Admitting: NURSE PRACTITIONER
Payer: COMMERCIAL

## 2025-04-09 DIAGNOSIS — K21.00 GASTROESOPHAGEAL REFLUX DISEASE WITH ESOPHAGITIS WITHOUT HEMORRHAGE: ICD-10-CM

## 2025-04-09 DIAGNOSIS — R13.19 ESOPHAGEAL DYSPHAGIA: ICD-10-CM

## 2025-04-09 PROCEDURE — 74221 X-RAY XM ESOPHAGUS 2CNTRST: CPT

## 2025-04-09 RX ADMIN — ANTACID/ANTIFLATULENT 1 PACKET: 380; 550; 10; 10 GRANULE, EFFERVESCENT ORAL at 08:25

## 2025-04-09 RX ADMIN — BARIUM SULFATE 183 ML: 960 POWDER, FOR SUSPENSION ORAL at 08:25

## 2025-04-09 RX ADMIN — BARIUM SULFATE 700 MG: 700 TABLET ORAL at 08:25

## 2025-04-09 RX ADMIN — BARIUM SULFATE 135 ML: 980 POWDER, FOR SUSPENSION ORAL at 08:25

## 2025-04-21 DIAGNOSIS — R93.3 ABNORMAL ESOPHAGRAM: ICD-10-CM

## 2025-04-21 DIAGNOSIS — R13.19 ESOPHAGEAL DYSPHAGIA: Primary | ICD-10-CM

## 2025-04-21 DIAGNOSIS — K21.00 GASTROESOPHAGEAL REFLUX DISEASE WITH ESOPHAGITIS WITHOUT HEMORRHAGE: ICD-10-CM

## 2025-04-22 ENCOUNTER — TELEPHONE (OUTPATIENT)
Dept: GASTROENTEROLOGY | Facility: CLINIC | Age: 45
End: 2025-04-22
Payer: COMMERCIAL

## 2025-05-29 DIAGNOSIS — E55.9 VITAMIN D DEFICIENCY: ICD-10-CM

## 2025-05-29 DIAGNOSIS — E53.8 LOW SERUM VITAMIN B12: ICD-10-CM

## 2025-05-29 DIAGNOSIS — I10 BENIGN ESSENTIAL HTN: Primary | ICD-10-CM

## 2025-05-29 DIAGNOSIS — K76.0 FATTY LIVER: ICD-10-CM

## 2025-05-29 DIAGNOSIS — R73.01 IMPAIRED FASTING GLUCOSE: ICD-10-CM

## 2025-05-29 DIAGNOSIS — E53.8 LOW FOLIC ACID: ICD-10-CM

## 2025-06-03 ENCOUNTER — RESULTS FOLLOW-UP (OUTPATIENT)
Dept: FAMILY MEDICINE CLINIC | Facility: CLINIC | Age: 45
End: 2025-06-03
Payer: COMMERCIAL

## 2025-06-03 LAB
25(OH)D3+25(OH)D2 SERPL-MCNC: 34.2 NG/ML (ref 30–100)
ALBUMIN SERPL-MCNC: 4.9 G/DL (ref 4.1–5.1)
ALP SERPL-CCNC: 81 IU/L (ref 44–121)
ALT SERPL-CCNC: 33 IU/L (ref 0–44)
APPEARANCE UR: CLEAR
AST SERPL-CCNC: 22 IU/L (ref 0–40)
BACTERIA #/AREA URNS HPF: NORMAL /[HPF]
BASOPHILS # BLD AUTO: 0.1 X10E3/UL (ref 0–0.2)
BASOPHILS NFR BLD AUTO: 2 %
BILIRUB SERPL-MCNC: 0.5 MG/DL (ref 0–1.2)
BILIRUB UR QL STRIP: NEGATIVE
BUN SERPL-MCNC: 16 MG/DL (ref 6–24)
BUN/CREAT SERPL: 14 (ref 9–20)
CALCIUM SERPL-MCNC: 9.6 MG/DL (ref 8.7–10.2)
CASTS URNS QL MICRO: NORMAL /LPF
CHLORIDE SERPL-SCNC: 104 MMOL/L (ref 96–106)
CHOLEST SERPL-MCNC: 156 MG/DL (ref 100–199)
CO2 SERPL-SCNC: 24 MMOL/L (ref 20–29)
COLOR UR: YELLOW
CREAT SERPL-MCNC: 1.15 MG/DL (ref 0.76–1.27)
EGFRCR SERPLBLD CKD-EPI 2021: 79 ML/MIN/1.73
EOSINOPHIL # BLD AUTO: 0.2 X10E3/UL (ref 0–0.4)
EOSINOPHIL NFR BLD AUTO: 5 %
EPI CELLS #/AREA URNS HPF: NORMAL /HPF (ref 0–10)
ERYTHROCYTE [DISTWIDTH] IN BLOOD BY AUTOMATED COUNT: 13.1 % (ref 11.6–15.4)
FOLATE SERPL-MCNC: 9 NG/ML
GLOBULIN SER CALC-MCNC: 2.6 G/DL (ref 1.5–4.5)
GLUCOSE SERPL-MCNC: 99 MG/DL (ref 70–99)
GLUCOSE UR QL STRIP: NEGATIVE
HBA1C MFR BLD: 5.6 % (ref 4.8–5.6)
HCT VFR BLD AUTO: 47.9 % (ref 37.5–51)
HDLC SERPL-MCNC: 52 MG/DL
HGB BLD-MCNC: 16.1 G/DL (ref 13–17.7)
HGB UR QL STRIP: NEGATIVE
IMM GRANULOCYTES # BLD AUTO: 0 X10E3/UL (ref 0–0.1)
IMM GRANULOCYTES NFR BLD AUTO: 1 %
KETONES UR QL STRIP: ABNORMAL
LDLC SERPL CALC-MCNC: 92 MG/DL (ref 0–99)
LEUKOCYTE ESTERASE UR QL STRIP: NEGATIVE
LYMPHOCYTES # BLD AUTO: 1.2 X10E3/UL (ref 0.7–3.1)
LYMPHOCYTES NFR BLD AUTO: 26 %
MAGNESIUM SERPL-MCNC: 2.4 MG/DL (ref 1.6–2.3)
MCH RBC QN AUTO: 32.2 PG (ref 26.6–33)
MCHC RBC AUTO-ENTMCNC: 33.6 G/DL (ref 31.5–35.7)
MCV RBC AUTO: 96 FL (ref 79–97)
MICRO URNS: ABNORMAL
MICRO URNS: ABNORMAL
MONOCYTES # BLD AUTO: 0.6 X10E3/UL (ref 0.1–0.9)
MONOCYTES NFR BLD AUTO: 14 %
NEUTROPHILS # BLD AUTO: 2.3 X10E3/UL (ref 1.4–7)
NEUTROPHILS NFR BLD AUTO: 52 %
NITRITE UR QL STRIP: NEGATIVE
PH UR STRIP: 5 [PH] (ref 5–7.5)
PLATELET # BLD AUTO: 228 X10E3/UL (ref 150–450)
POTASSIUM SERPL-SCNC: 4.7 MMOL/L (ref 3.5–5.2)
PROT SERPL-MCNC: 7.5 G/DL (ref 6–8.5)
PROT UR QL STRIP: ABNORMAL
RBC # BLD AUTO: 5 X10E6/UL (ref 4.14–5.8)
RBC #/AREA URNS HPF: NORMAL /HPF (ref 0–2)
SODIUM SERPL-SCNC: 142 MMOL/L (ref 134–144)
SP GR UR STRIP: >=1.03 (ref 1–1.03)
T4 FREE SERPL-MCNC: 1.33 NG/DL (ref 0.82–1.77)
TRIGL SERPL-MCNC: 59 MG/DL (ref 0–149)
TSH SERPL DL<=0.005 MIU/L-ACNC: 1.96 UIU/ML (ref 0.45–4.5)
UROBILINOGEN UR STRIP-MCNC: 0.2 MG/DL (ref 0.2–1)
VIT B12 SERPL-MCNC: 662 PG/ML (ref 232–1245)
VLDLC SERPL CALC-MCNC: 12 MG/DL (ref 5–40)
WBC # BLD AUTO: 4.4 X10E3/UL (ref 3.4–10.8)
WBC #/AREA URNS HPF: NORMAL /HPF (ref 0–5)

## 2025-06-05 ENCOUNTER — OFFICE VISIT (OUTPATIENT)
Dept: FAMILY MEDICINE CLINIC | Facility: CLINIC | Age: 45
End: 2025-06-05
Payer: COMMERCIAL

## 2025-06-05 VITALS
TEMPERATURE: 99.3 F | WEIGHT: 255 LBS | DIASTOLIC BLOOD PRESSURE: 80 MMHG | OXYGEN SATURATION: 94 % | BODY MASS INDEX: 38.65 KG/M2 | HEIGHT: 68 IN | SYSTOLIC BLOOD PRESSURE: 116 MMHG | HEART RATE: 78 BPM

## 2025-06-05 DIAGNOSIS — F41.1 GENERALIZED ANXIETY DISORDER: ICD-10-CM

## 2025-06-05 DIAGNOSIS — J45.20 MILD INTERMITTENT REACTIVE AIRWAY DISEASE WITHOUT COMPLICATION: ICD-10-CM

## 2025-06-05 DIAGNOSIS — R73.01 IMPAIRED FASTING GLUCOSE: ICD-10-CM

## 2025-06-05 DIAGNOSIS — E55.9 VITAMIN D DEFICIENCY: ICD-10-CM

## 2025-06-05 DIAGNOSIS — G47.9 SLEEP DISTURBANCE: ICD-10-CM

## 2025-06-05 DIAGNOSIS — K21.9 GASTROESOPHAGEAL REFLUX DISEASE WITHOUT ESOPHAGITIS: ICD-10-CM

## 2025-06-05 DIAGNOSIS — K76.0 FATTY LIVER: ICD-10-CM

## 2025-06-05 DIAGNOSIS — I83.93 ASYMPTOMATIC VARICOSE VEINS OF BOTH LOWER EXTREMITIES: ICD-10-CM

## 2025-06-05 DIAGNOSIS — I10 BENIGN ESSENTIAL HTN: Primary | ICD-10-CM

## 2025-06-05 RX ORDER — CARVEDILOL 12.5 MG/1
12.5 TABLET ORAL 2 TIMES DAILY WITH MEALS
Qty: 180 TABLET | Refills: 1 | Status: SHIPPED | OUTPATIENT
Start: 2025-06-05

## 2025-06-05 RX ORDER — ALBUTEROL SULFATE 90 UG/1
2 INHALANT RESPIRATORY (INHALATION) EVERY 4 HOURS PRN
Qty: 8 G | Refills: 11 | Status: SHIPPED | OUTPATIENT
Start: 2025-06-05

## 2025-06-05 NOTE — PROGRESS NOTES
"Subjective   Usman Burrell is a 45 y.o. male.     History of Present Illness    Since the last visit, he has overall felt tired.  He has Primary Hypertension and well controlled on current medication, Impaired fasting glucose and will monitor labs to watch for DMII, Hyperlipidemia and working on this with diet and exercise, and Vitamin D deficiency and labs are at goal >30 ng/mL.  he has been compliant with current medications have reviewed them.  The patient denies medication side effects.  Will refill medications. /94 (BP Location: Right arm, Patient Position: Sitting, Cuff Size: Adult)   Pulse 78   Temp 99.3 °F (37.4 °C) (Oral)   Ht 172.7 cm (68\")   Wt 116 kg (255 lb)   SpO2 94%   BMI 38.77 kg/m² .      To have EGD  130-135/90s    Walking  Still SOA exertion----allergy wanted him to see sleep medicine to be evaluated.  I will place another referral  Results for orders placed or performed in visit on 05/29/25   Comprehensive metabolic panel    Collection Time: 06/02/25 10:03 AM    Specimen: Blood   Result Value Ref Range    Glucose 99 70 - 99 mg/dL    BUN 16 6 - 24 mg/dL    Creatinine 1.15 0.76 - 1.27 mg/dL    EGFR Result 79 >59 mL/min/1.73    BUN/Creatinine Ratio 14 9 - 20    Sodium 142 134 - 144 mmol/L    Potassium 4.7 3.5 - 5.2 mmol/L    Chloride 104 96 - 106 mmol/L    Total CO2 24 20 - 29 mmol/L    Calcium 9.6 8.7 - 10.2 mg/dL    Total Protein 7.5 6.0 - 8.5 g/dL    Albumin 4.9 4.1 - 5.1 g/dL    Globulin 2.6 1.5 - 4.5 g/dL    Total Bilirubin 0.5 0.0 - 1.2 mg/dL    Alkaline Phosphatase 81 44 - 121 IU/L    AST (SGOT) 22 0 - 40 IU/L    ALT (SGPT) 33 0 - 44 IU/L   Lipid panel    Collection Time: 06/02/25 10:03 AM    Specimen: Blood   Result Value Ref Range    Total Cholesterol 156 100 - 199 mg/dL    Triglycerides 59 0 - 149 mg/dL    HDL Cholesterol 52 >39 mg/dL    VLDL Cholesterol Jewel 12 5 - 40 mg/dL    LDL Chol Calc (NIH) 92 0 - 99 mg/dL   TSH    Collection Time: 06/02/25 10:03 AM    Specimen: Blood "   Result Value Ref Range    TSH 1.960 0.450 - 4.500 uIU/mL   Hemoglobin A1c    Collection Time: 06/02/25 10:03 AM    Specimen: Blood   Result Value Ref Range    Hemoglobin A1C 5.6 4.8 - 5.6 %   Magnesium    Collection Time: 06/02/25 10:03 AM    Specimen: Blood   Result Value Ref Range    Magnesium 2.4 (H) 1.6 - 2.3 mg/dL   Vitamin D,25-Hydroxy    Collection Time: 06/02/25 10:03 AM    Specimen: Blood   Result Value Ref Range    25 Hydroxy, Vitamin D 34.2 30.0 - 100.0 ng/mL   Vitamin B12    Collection Time: 06/02/25 10:03 AM    Specimen: Blood   Result Value Ref Range    Vitamin B-12 662 232 - 1,245 pg/mL   Folate    Collection Time: 06/02/25 10:03 AM    Specimen: Blood   Result Value Ref Range    Folate 9.0 >3.0 ng/mL   T4, Free    Collection Time: 06/02/25 10:03 AM    Specimen: Blood   Result Value Ref Range    Free T4 1.33 0.82 - 1.77 ng/dL   Microscopic Examination -    Collection Time: 06/02/25 10:03 AM   Result Value Ref Range    WBC, UA 0-5 0 - 5 /hpf    RBC, UA None seen 0 - 2 /hpf    Epithelial Cells (non renal) 0-10 0 - 10 /hpf    Casts None seen None seen /lpf    Bacteria, UA None seen None seen/Few   CBC and Differential    Collection Time: 06/02/25 10:03 AM    Specimen: Blood   Result Value Ref Range    WBC 4.4 3.4 - 10.8 x10E3/uL    RBC 5.00 4.14 - 5.80 x10E6/uL    Hemoglobin 16.1 13.0 - 17.7 g/dL    Hematocrit 47.9 37.5 - 51.0 %    MCV 96 79 - 97 fL    MCH 32.2 26.6 - 33.0 pg    MCHC 33.6 31.5 - 35.7 g/dL    RDW 13.1 11.6 - 15.4 %    Platelets 228 150 - 450 x10E3/uL    Neutrophil Rel % 52 Not Estab. %    Lymphocyte Rel % 26 Not Estab. %    Monocyte Rel % 14 Not Estab. %    Eosinophil Rel % 5 Not Estab. %    Basophil Rel % 2 Not Estab. %    Neutrophils Absolute 2.3 1.4 - 7.0 x10E3/uL    Lymphocytes Absolute 1.2 0.7 - 3.1 x10E3/uL    Monocytes Absolute 0.6 0.1 - 0.9 x10E3/uL    Eosinophils Absolute 0.2 0.0 - 0.4 x10E3/uL    Basophils Absolute 0.1 0.0 - 0.2 x10E3/uL    Immature Granulocyte Rel % 1 Not  Estab. %    Immature Grans Absolute 0.0 0.0 - 0.1 x10E3/uL   Urinalysis With Microscopic - Urine, Clean Catch    Collection Time: 06/02/25 10:03 AM    Specimen: Urine, Clean Catch   Result Value Ref Range    Specific Gravity, UA      >=1.030 (A) 1.005 - 1.030    pH, UA 5.0 5.0 - 7.5    Color, UA Yellow Yellow    Appearance, UA Clear Clear    Leukocytes, UA Negative Negative    Protein Trace Negative/Trace    Glucose, UA Negative Negative    Ketones Trace (A) Negative    Blood, UA Negative Negative    Bilirubin, UA Negative Negative    Urobilinogen, UA 0.2 0.2 - 1.0 mg/dL    Nitrite, UA Negative Negative    Microscopic Examination Comment     Microscopic Examination See below:      Calcium cardiac CT scan was 1/13/2024 and score was 0  Vitamin levels are at goal.  Magnesium is in acceptable range.  Urine is negative for protein and blood.  Your glucose, electrolytes, liver functions, kidney functions are normal range.  Cholesterol is excellent.  Blood count in normal range.  Thyroid labs in normal range.  Hemoglobin A1c average glucose 3 months is in normal range.  Other labs okay   Written by Esther Paetl PA-C on 6/3/2025  7:28 AM EDT  [facility] by [facility] for esophageal dysphagia on 3/13/2025 with DR Riley also noting he is on Protonix twice daily for GERD.  And has history of fatty liver ordered esophagram.  Also diet and exercise Mediterranean diet for the fatty liver.  Esophagram was done on 4/9/2025 noting small hiatal hernia, Schatzki's ring resulting in delayed transit of a 12.5  mm diameter barium tablet requiring multiple liquid boluses prior to  passing into the gastric body.  Had cardiology consult 3/17/2025 with [facility] SC[facility] Chin on 3/17/2025 with Dr Ramirez noting history of A-fib.  Cardiac testing treadmill stress test 12/13/2024 and echocardiogram  With results negative for ischemia, normal echo with EF 59%  EKG day of visit 3/17/2025 normal  Plan  Patient still reporting  "shortness of breath with exertion, daytime fatigue, and uncontrolled blood pressure.  Patient does report that he is some snoring.  Will set patient up for home sleep study to rule out sleep apnea as cause of hypertension and fatigue after normal echocardiogram and stress testing that was performed a couple months ago.  Will increase amlodipine from 2.5 mg to 5 mg due to elevated blood pressure at today's appointment as well.   Advised consultation with sleep medicine.  Also noted blood pressure was elevated at visit I did increase amlodipine to 5 mg and could increase amlodipine to 10 mg or increase carvedilol if remains uncontrolled.    Usman ESCUDERO Burrell male 45 y.o., /94 (BP Location: Right arm, Patient Position: Sitting, Cuff Size: Adult)   Pulse 78   Temp 99.3 °F (37.4 °C) (Oral)   Ht 172.7 cm (68\")   Wt 116 kg (255 lb)   SpO2 94%   BMI 38.77 kg/m²   who presents today for follow up of Anxiety.  He reports medication is working well, patient desires to continue on Rx, and needs refill. Onset of symptoms was approximately several years ago. He denies current suicidal and homicidal ideation. Risk factors are family history of anxiety and or depression, lifestyle of multiple roles, and grief.  Previous treatment includes current Rx. He complains of the following medication side effects:none. The patient declines to go to counseling..    Also he does have symptoms of restless legs sometimes during the night he will feel like there are bugs crawling on his legs and he has to shake him and that stop... I explained restless leg syndrome also common with obstructive sleep apnea that were checking for if becomes worse he will consider treatment, Requip  Has history of reactive airway disease had asthma as a child does take albuterol just as needed and does help  The following portions of the patient's history were reviewed and updated as appropriate: allergies, current medications, past family history, past " medical history, past social history, past surgical history, and problem list.    Review of Systems   Constitutional:  Negative for diaphoresis.   HENT:  Negative for nosebleeds and trouble swallowing.    Eyes:  Negative for blurred vision and visual disturbance.   Respiratory:  Negative for choking.    Gastrointestinal:  Negative for blood in stool.   Allergic/Immunologic: Negative for immunocompromised state.   Neurological:  Negative for facial asymmetry and speech difficulty.   Psychiatric/Behavioral:  Negative for self-injury and suicidal ideas.        Objective   Physical Exam  Vitals and nursing note reviewed.   Constitutional:       General: He is not in acute distress.     Appearance: He is well-developed. He is not diaphoretic.   HENT:      Head: Normocephalic.   Eyes:      Conjunctiva/sclera: Conjunctivae normal.      Pupils: Pupils are equal, round, and reactive to light.   Cardiovascular:      Rate and Rhythm: Normal rate and regular rhythm.      Pulses: Normal pulses.      Heart sounds: Normal heart sounds. No murmur heard.     Comments: Varicose veins lower legs  Pulmonary:      Effort: Pulmonary effort is normal.      Breath sounds: Normal breath sounds.   Musculoskeletal:         General: Normal range of motion.      Cervical back: Normal range of motion and neck supple.   Skin:     General: Skin is warm and dry.      Findings: No rash.   Neurological:      Mental Status: He is alert and oriented to person, place, and time.   Psychiatric:         Mood and Affect: Mood normal. Affect is not inappropriate.         Behavior: Behavior normal.         Thought Content: Thought content normal.         Judgment: Judgment normal.           Assessment & Plan   Diagnoses and all orders for this visit:    1. Benign essential HTN (Primary)    2. Sleep disturbance  -     Ambulatory Referral to Sleep Medicine      Followed by [facility] and is to have EGD for dysphagia and had abnormal esophagram  Sees cardiology  and did increase amlodipine at his last visit to 5 mg  Sees GI also for fatty liver.  Last ultrasound 2/28/2024  Still has Ativan takes for panic attacks and does work will have him do follow-up appointment if needed before our next appointment to sign controlled substance contract and get urine drug screen  Plan, Usman Burrell, was seen today.  he was seen for HTN and continue medication, Imparied fasting glucose and plan follow up labs, diet, and exercise, Hyperlipidemia and will work on this with diet and exercise, and Vitamin D deficiency and supplemented.    Will put albuterol MDI on file has reactive airway disease works well  Lower extremity compression stockings will help varicose veins in legs  Labs are fantastic A1c was back in normal range liver enzymes in range..  Cholesterol ratio was less than 7.5%  Needs to more exercise

## 2025-07-02 ENCOUNTER — ANESTHESIA (OUTPATIENT)
Dept: SURGERY | Facility: SURGERY CENTER | Age: 45
End: 2025-07-02
Payer: COMMERCIAL

## 2025-07-02 ENCOUNTER — HOSPITAL ENCOUNTER (OUTPATIENT)
Facility: SURGERY CENTER | Age: 45
Setting detail: HOSPITAL OUTPATIENT SURGERY
Discharge: HOME | End: 2025-07-02
Attending: INTERNAL MEDICINE | Admitting: INTERNAL MEDICINE
Payer: COMMERCIAL

## 2025-07-02 ENCOUNTER — APPOINTMENT (OUTPATIENT)
Dept: SLEEP MEDICINE | Facility: HOSPITAL | Age: 45
End: 2025-07-02
Payer: COMMERCIAL

## 2025-07-02 ENCOUNTER — ANESTHESIA EVENT (OUTPATIENT)
Dept: SURGERY | Facility: SURGERY CENTER | Age: 45
End: 2025-07-02
Payer: COMMERCIAL

## 2025-07-02 VITALS
BODY MASS INDEX: 38.16 KG/M2 | WEIGHT: 251.8 LBS | SYSTOLIC BLOOD PRESSURE: 127 MMHG | RESPIRATION RATE: 18 BRPM | OXYGEN SATURATION: 94 % | HEIGHT: 68 IN | TEMPERATURE: 97.8 F | HEART RATE: 69 BPM | DIASTOLIC BLOOD PRESSURE: 86 MMHG

## 2025-07-02 DIAGNOSIS — R13.19 ESOPHAGEAL DYSPHAGIA: ICD-10-CM

## 2025-07-02 DIAGNOSIS — R93.3 ABNORMAL ESOPHAGRAM: ICD-10-CM

## 2025-07-02 DIAGNOSIS — K21.00 GASTROESOPHAGEAL REFLUX DISEASE WITH ESOPHAGITIS WITHOUT HEMORRHAGE: ICD-10-CM

## 2025-07-02 PROCEDURE — 25810000003 LACTATED RINGERS PER 1000 ML: Performed by: INTERNAL MEDICINE

## 2025-07-02 PROCEDURE — 25010000002 LIDOCAINE 2% SOLUTION: Performed by: NURSE ANESTHETIST, CERTIFIED REGISTERED

## 2025-07-02 PROCEDURE — 43249 ESOPH EGD DILATION <30 MM: CPT | Performed by: INTERNAL MEDICINE

## 2025-07-02 PROCEDURE — 43239 EGD BIOPSY SINGLE/MULTIPLE: CPT | Performed by: INTERNAL MEDICINE

## 2025-07-02 PROCEDURE — 25010000002 PROPOFOL 10 MG/ML EMULSION: Performed by: NURSE ANESTHETIST, CERTIFIED REGISTERED

## 2025-07-02 PROCEDURE — 25010000002 PROPOFOL 1000 MG/100ML EMULSION: Performed by: NURSE ANESTHETIST, CERTIFIED REGISTERED

## 2025-07-02 PROCEDURE — C1726 CATH, BAL DIL, NON-VASCULAR: HCPCS | Performed by: INTERNAL MEDICINE

## 2025-07-02 RX ORDER — PROPOFOL 10 MG/ML
INJECTION, EMULSION INTRAVENOUS AS NEEDED
Status: DISCONTINUED | OUTPATIENT
Start: 2025-07-02 | End: 2025-07-02 | Stop reason: SURG

## 2025-07-02 RX ORDER — LIDOCAINE HYDROCHLORIDE 20 MG/ML
INJECTION, SOLUTION INFILTRATION; PERINEURAL AS NEEDED
Status: DISCONTINUED | OUTPATIENT
Start: 2025-07-02 | End: 2025-07-02 | Stop reason: SURG

## 2025-07-02 RX ORDER — LIDOCAINE HYDROCHLORIDE 10 MG/ML
0.5 INJECTION, SOLUTION INFILTRATION; PERINEURAL ONCE AS NEEDED
Status: DISCONTINUED | OUTPATIENT
Start: 2025-07-02 | End: 2025-07-02 | Stop reason: HOSPADM

## 2025-07-02 RX ORDER — SODIUM CHLORIDE 0.9 % (FLUSH) 0.9 %
10 SYRINGE (ML) INJECTION AS NEEDED
Status: DISCONTINUED | OUTPATIENT
Start: 2025-07-02 | End: 2025-07-02 | Stop reason: HOSPADM

## 2025-07-02 RX ORDER — SODIUM CHLORIDE, SODIUM LACTATE, POTASSIUM CHLORIDE, CALCIUM CHLORIDE 600; 310; 30; 20 MG/100ML; MG/100ML; MG/100ML; MG/100ML
1000 INJECTION, SOLUTION INTRAVENOUS CONTINUOUS
Status: DISCONTINUED | OUTPATIENT
Start: 2025-07-02 | End: 2025-07-02 | Stop reason: HOSPADM

## 2025-07-02 RX ADMIN — LIDOCAINE HYDROCHLORIDE 100 MG: 20 INJECTION, SOLUTION INFILTRATION; PERINEURAL at 11:05

## 2025-07-02 RX ADMIN — SODIUM CHLORIDE, POTASSIUM CHLORIDE, SODIUM LACTATE AND CALCIUM CHLORIDE 1000 ML: 600; 310; 30; 20 INJECTION, SOLUTION INTRAVENOUS at 10:28

## 2025-07-02 RX ADMIN — PROPOFOL 200 MCG/KG/MIN: 10 INJECTION, EMULSION INTRAVENOUS at 11:05

## 2025-07-02 RX ADMIN — PROPOFOL 100 MG: 10 INJECTION, EMULSION INTRAVENOUS at 11:05

## 2025-07-02 NOTE — H&P
[facility]  Pre Procedure History & Physical    Chief Complaint:   Dysphagia    Subjective     HPI:   45 y.o. male here for EGD for c/o dysphagia.      Past Medical History:   Past Medical History:   Diagnosis Date    Allergic     Anxiety Last week    Asthma     Difficulty swallowing     Fatty liver     GERD without esophagitis     Hypertension     Palpitations        Past Surgical History:  Past Surgical History:   Procedure Laterality Date    ENDOSCOPY N/A 07/31/2020    Procedure: ESOPHAGOGASTRODUODENOSCOPY with cold biopsies and cold biopsy polypectomy;  Surgeon: Cristina Brown MD;  Location:  [facility];  Service: Gastroenterology;  Laterality: N/A;  pre-GERD, GLOBUS  post-gastritis, gastric polyp, furrowed esophagus    ENDOSCOPY N/A 5/7/2024    Procedure: ESOPHAGOGASTRODUODENOSCOPY with biopsies;  Surgeon: Jason Riley MD;  Location:  [facility];  Service: Gastroenterology;  Laterality: N/A;  pre- globus sensation  post- hiatal hernia, gastritis       Family History:  Family History   Problem Relation Age of Onset    Diabetes Mother     Lung disease Mother     Hypertension Mother     Depression Mother     Glaucoma Mother     Other (atrial flutter) Mother     Breast cancer Mother     Anxiety disorder Mother     Heart disease Father     Hypertension Father     Atrial fibrillation Brother     Stroke Brother     Malig Hyperthermia Neg Hx        Social History:   reports that he has never smoked. He has never been exposed to tobacco smoke. He has never used smokeless tobacco. He reports that he does not drink alcohol and does not use drugs.    Medications:   Medications Prior to Admission   Medication Sig Dispense Refill Last Dose/Taking    amLODIPine (NORVASC) 5 MG tablet Take 1 tablet by mouth Daily. For BP 90 tablet 3 7/2/2025 Morning    carvedilol (COREG) 12.5 MG tablet Take 1 tablet by mouth 2 (Two) Times a Day With Meals. For  tablet 1 7/2/2025 Morning     "pantoprazole (PROTONIX) 40 MG EC tablet TAKE 1 TABLET BY MOUTH TWICE DAILY WITH MEALS 180 tablet 3 7/1/2025    sertraline (Zoloft) 50 MG tablet one PO daily for stress 90 tablet 3 7/1/2025    albuterol sulfate HFA (ProAir HFA) 108 (90 Base) MCG/ACT inhaler Inhale 2 puffs Every 4 (Four) Hours As Needed for Wheezing or Shortness of Air. 8 g 11 More than a month    cetirizine (ZyrTEC Allergy) 10 MG tablet Take 1 tablet by mouth Daily As Needed.   More than a month    fluticasone (FLONASE) 50 MCG/ACT nasal spray Administer 2 sprays into the nostril(s) as directed by provider As Needed for Allergies.   More than a month    LORazepam (Ativan) 0.5 MG tablet Take 1 tablet by mouth Every 8 (Eight) Hours As Needed (panic attacks). 20 tablet 0 More than a month    ondansetron ODT (ZOFRAN-ODT) 4 MG disintegrating tablet Place 1 tablet on the tongue Every 8 (Eight) Hours As Needed for Nausea or Vomiting. 30 tablet 2 More than a month       Allergies:  Penicillins and Lisinopril    ROS:    Pertinent items are noted in HPI     Objective     Blood pressure 133/95, pulse 77, temperature 97.8 °F (36.6 °C), temperature source Infrared, resp. rate 18, height 172.7 cm (68\"), weight 114 kg (251 lb 12.8 oz), SpO2 96%.    Physical Exam   Constitutional: Pt is oriented to person, place, and time and well-developed, well-nourished, and in no distress.   Mouth/Throat: Oropharynx is clear and moist.   Neck: Normal range of motion.   Cardiovascular: Normal rate, regular rhythm and normal heart sounds.    Pulmonary/Chest: Effort normal and breath sounds normal.   Abdominal: Soft. Nontender  Skin: Skin is warm and dry.   Psychiatric: Mood, memory, affect and judgment normal.     Assessment & Plan     Diagnosis:  Dysphagia    Anticipated Surgical Procedure:  EGD    The risks, benefits, and alternatives of this procedure have been discussed with the patient or the responsible party- the patient understands and agrees to " proceed.

## 2025-07-02 NOTE — ANESTHESIA PREPROCEDURE EVALUATION
Anesthesia Evaluation     NPO Solid Status: > 8 hours  NPO Liquid Status: > 8 hours           Airway   Mallampati: I  No difficulty expected  Dental      Pulmonary    (+) asthma,  Cardiovascular   Exercise tolerance: good (4-7 METS)    (+) hypertension      Neuro/Psych  (+) psychiatric history  GI/Hepatic/Renal/Endo    (+) obesity, liver disease fatty liver disease    Musculoskeletal     Abdominal    Substance History      OB/GYN          Other                    Anesthesia Plan    ASA 3     MAC     intravenous induction     Anesthetic plan, risks, benefits, and alternatives have been provided, discussed and informed consent has been obtained with: patient.    CODE STATUS:

## 2025-07-02 NOTE — ANESTHESIA POSTPROCEDURE EVALUATION
Patient: Usman Burrell    Procedure Summary       Date: 07/02/25 Room / Location: [facility] OR [facility] / [facility]    Anesthesia Start: 1103 Anesthesia Stop: 1119    Procedure: ESOPHAGOGASTRODUODENOSCOPY with 13.5 mm balloon dilation and cold biopsies (Esophagus) Diagnosis:       Esophageal dysphagia      Gastroesophageal reflux disease with esophagitis without hemorrhage      Abnormal esophagram      (Esophageal dysphagia [R13.19])      (Gastroesophageal reflux disease with esophagitis without hemorrhage [K21.00])      (Abnormal esophagram [R93.3])    Surgeons: Jason Riley MD Provider: Enrique Marroquin MD    Anesthesia Type: MAC ASA Status: 3            Anesthesia Type: MAC    Vitals  Vitals Value Taken Time   /86 07/02/25 11:39   Temp 36.6 °C (97.8 °F) 07/02/25 11:19   Pulse 69 07/02/25 11:39   Resp 18 07/02/25 11:39   SpO2 94 % 07/02/25 11:39           Post Anesthesia Care and Evaluation    Patient location during evaluation: PACU  Patient participation: complete - patient participated  Level of consciousness: awake  Pain management: adequate    Airway patency: patent  Anesthetic complications: No anesthetic complications  PONV Status: none  Cardiovascular status: acceptable  Respiratory status: acceptable  Hydration status: acceptable

## 2025-07-02 NOTE — DISCHARGE INSTRUCTIONS
ENDOSCOPY - EGD/COLONOSCOPY       ADULT CARE DISCHARGE  INSTRUCTIONS     Symptoms you may temporarily experience:      Sore Throat     Hoarseness     Bloating/Cramping     Dizziness     IV Irritation/tenderness     Gas or Belching     Slight fever     Small amount of blood in vomit or stool       Call Your Doctor for the following Problems: ______________________     ________________     Fever of 101 degrees or higher       Sharp abdominal  pain     Red streak up the arm from the IV site     Severe cramping        Large amount of blood in stool or vomit      ***  If polyps were removed there is a risk of bleeding which is  more likely to occur 7-10 days after colonoscopy     Instructions for the next 24 hours after your Procedure:     Adult supervision     Do NOT drink any alcohol      Do not work today     NO important decisions     DO NOT sign any legal documents     You may shower/ bathe       DO NOT  DRIVE or operate machinery     Resume normal activity tomorrow       Discharge  Diet:     Avoid spicy/ greasy foods     Avoid any food that will cause more gas or bloating       *** Seek IMMEDIATE medical attention and call 911 if you develop symptoms such as:     Chest pain     Shortness of breath     Severe bleeding ENDOSCOPY - EGD/COLONOSCOPY       ADULT CARE DISCHARGE  INSTRUCTIONS     Symptoms you may temporarily experience:      Sore Throat     Hoarseness     Bloating/Cramping     Dizziness     IV Irritation/tenderness     Gas or Belching     Slight fever     Small amount of blood in vomit or stool       Call Your Doctor for the following Problems: ______________________     ________________     Fever of 101 degrees or higher       Sharp abdominal  pain     Red streak up the arm from the IV site     Severe cramping        Large amount of blood in stool or vomit      ***  If polyps were removed there is a risk of bleeding which is  more likely to occur 7-10 days after colonoscopy     Instructions for the next  24 hours after your Procedure:     Adult supervision     Do NOT drink any alcohol      Do not work today     NO important decisions     DO NOT sign any legal documents     You may shower/ bathe       DO NOT  DRIVE or operate machinery     Resume normal activity tomorrow       Discharge  Diet:     Avoid spicy/ greasy foods     Avoid any food that will cause more gas or bloating       *** Seek IMMEDIATE medical attention and call 911 if you develop symptoms such as:     Chest pain     Shortness of breath     Severe bleeding

## 2025-07-07 LAB
CYTO UR: NORMAL
LAB AP CASE REPORT: NORMAL
LAB AP SPECIAL STAINS: NORMAL
PATH REPORT.FINAL DX SPEC: NORMAL
PATH REPORT.GROSS SPEC: NORMAL

## 2025-07-09 ENCOUNTER — OFFICE VISIT (OUTPATIENT)
Facility: HOSPITAL | Age: 45
End: 2025-07-09
Payer: COMMERCIAL

## 2025-07-09 VITALS
OXYGEN SATURATION: 96 % | HEIGHT: 68 IN | BODY MASS INDEX: 38.25 KG/M2 | SYSTOLIC BLOOD PRESSURE: 125 MMHG | HEART RATE: 87 BPM | DIASTOLIC BLOOD PRESSURE: 78 MMHG | WEIGHT: 252.4 LBS

## 2025-07-09 DIAGNOSIS — G47.61 PLMD (PERIODIC LIMB MOVEMENT DISORDER): ICD-10-CM

## 2025-07-09 DIAGNOSIS — I10 PRIMARY HYPERTENSION: ICD-10-CM

## 2025-07-09 DIAGNOSIS — E66.1 CLASS 2 DRUG-INDUCED OBESITY WITH SERIOUS COMORBIDITY AND BODY MASS INDEX (BMI) OF 39.0 TO 39.9 IN ADULT: Primary | ICD-10-CM

## 2025-07-09 DIAGNOSIS — G47.33 OSA (OBSTRUCTIVE SLEEP APNEA): ICD-10-CM

## 2025-07-09 DIAGNOSIS — E66.812 CLASS 2 DRUG-INDUCED OBESITY WITH SERIOUS COMORBIDITY AND BODY MASS INDEX (BMI) OF 39.0 TO 39.9 IN ADULT: Primary | ICD-10-CM

## 2025-07-09 DIAGNOSIS — G25.81 RESTLESS LEGS SYNDROME (RLS): ICD-10-CM

## 2025-07-09 DIAGNOSIS — K21.00 GASTROESOPHAGEAL REFLUX DISEASE WITH ESOPHAGITIS WITHOUT HEMORRHAGE: ICD-10-CM

## 2025-07-09 DIAGNOSIS — E61.1 IRON DEFICIENCY: ICD-10-CM

## 2025-07-09 PROCEDURE — G0463 HOSPITAL OUTPT CLINIC VISIT: HCPCS

## 2025-07-09 NOTE — PROGRESS NOTES
Patient Care Team:  Esther Patel PA-C as PCP - General (Family Medicine)  Julián Galvan MD as Consulting Physician (Urology)  Yonas Penn MD as Consulting Physician (Cardiology)  Cristina Brown MD as Consulting Physician (Gastroenterology)  Dandre Alberto MD, MPH as Consulting Physician (Sleep Medicine)        History of Present Illness  The patient presents for evaluation of sleep issues.    He reports difficulty sleeping, often waking up around 2:00 or 2:30 AM and remaining awake for 2 to 3 hours. He has not undergone a sleep study. He also experiences heartburn and reflux. His uvula tends to swell in the morning, which once led to a choking sensation that required emergency room treatment with steroids. He underwent an esophageal dilation procedure last week. He is currently on Zoloft, two medications for blood pressure, and a medication for heartburn.    He experiences a tingling sensation in his legs, particularly in the evenings while watching TV or when he goes to bed around 8:00 or 9:00 PM. This sensation is less noticeable when he is active, such as during driving. He has been told that this could be due to his varicose veins.    SOCIAL HISTORY  He does not smoke or consume alcohol. He drinks 2 cups of coffee daily.    FAMILY HISTORY  His father had sleep apnea.    MEDICATIONS  Zoloft       [place]: 7    Data Reviewed: Medical chart and sleep questionnaire      PMH:  Past Medical History:   Diagnosis Date    Allergic     Anxiety Last week    Asthma     Difficulty swallowing     Fatty liver     GERD without esophagitis     Hypertension     Palpitations           Allergies:  Penicillins and Lisinopril     Medication Review:   Current Outpatient Medications on File Prior to Visit   Medication Sig Dispense Refill    albuterol sulfate HFA (ProAir HFA) 108 (90 Base) MCG/ACT inhaler Inhale 2 puffs Every 4 (Four) Hours As Needed for Wheezing or Shortness of Air. 8 g 11     "amLODIPine (NORVASC) 5 MG tablet Take 1 tablet by mouth Daily. For BP 90 tablet 3    carvedilol (COREG) 12.5 MG tablet Take 1 tablet by mouth 2 (Two) Times a Day With Meals. For  tablet 1    cetirizine (ZyrTEC Allergy) 10 MG tablet Take 1 tablet by mouth Daily As Needed.      fluticasone (FLONASE) 50 MCG/ACT nasal spray Administer 2 sprays into the nostril(s) as directed by provider As Needed for Allergies.      LORazepam (Ativan) 0.5 MG tablet Take 1 tablet by mouth Every 8 (Eight) Hours As Needed (panic attacks). 20 tablet 0    ondansetron ODT (ZOFRAN-ODT) 4 MG disintegrating tablet Place 1 tablet on the tongue Every 8 (Eight) Hours As Needed for Nausea or Vomiting. 30 tablet 2    pantoprazole (PROTONIX) 40 MG EC tablet TAKE 1 TABLET BY MOUTH TWICE DAILY WITH MEALS 180 tablet 3    sertraline (Zoloft) 50 MG tablet one PO daily for stress 90 tablet 3     No current facility-administered medications on file prior to visit.         Vital Signs:    Vitals:    07/09/25 0920   BP: 125/78   Pulse: 87   SpO2: 96%   Weight: 114 kg (252 lb 6.4 oz)   Height: 172.7 cm (67.99\")        Body mass index is 38.39 kg/m².  Neck Circumference: 19 inches  .[email or web address]         Physical Exam:    Constitutional:  Well developed 45 y.o. male that appears in no apparent distress.  Awake & oriented times 3.  Normal mood with normal recent and remote memory and normal judgement.  Eyes:  Conjunctivae normal.  Oropharynx: Moist mucous membranes without exudate and Mallampati 4 with macroglossia  Neck: Trachea midline  Respiratory: Effort is not labored  Cardiovascular: Radial pulse regular  Musculoskeletal: Gait appears normal, no digital clubbing evident, no pre-tibial edema        Impression:   Encounter Diagnoses   Name Primary?    Class 2 drug-induced obesity with serious comorbidity and body mass index (BMI) of 39.0 to 39.9 in adult Yes    Primary hypertension     Gastroesophageal reflux disease with esophagitis without hemorrhage  "    Restless legs syndrome (RLS)     Iron deficiency     PLMD (periodic limb movement disorder)     JACKY (obstructive sleep apnea)      Patient's BMI is Body mass index is 38.39 kg/m².        Assessment & Plan  1. Sleep Apnea.  Symptoms suggest a likely diagnosis of sleep apnea, including waking up at night and experiencing heartburn. The relationship between sleep apnea and heartburn was explained, including how sleep apnea can lead to increased stomach acid and esophageal scarring. An in-lab sleep study will be ordered to confirm the diagnosis.    2. Restless Legs Syndrome.  Symptoms of tingling and crawling sensations in the legs, particularly in the evening, suggest restless legs syndrome. The potential impact of iron and ferritin levels on restless legs symptoms was discussed. His ferritin and iron levels were checked in April 2024 and found to be high, so no additional tests are needed at this time. Treatment for restless legs will be considered after addressing sleep apnea to avoid exacerbating the condition.       The patient should practice good sleep hygiene measures.      Weight loss might be beneficial in this patient who has a Body mass index is 38.39 kg/m².      Pathophysiology of JACKY described to the patient.  Cardiovascular complications of untreated JACKY also reviewed.      The patient was cautioned about the dangers of drowsy driving.    Patient or patient representative verbalized consent for the use of Ambient Listening during the visit with  Eliazar Alberto MD for chart documentation. 7/9/2025  09:48 EDT     Eliazar Alberto MD  Sleep Medicine  07/09/25  09:46 EDT

## 2025-08-21 DIAGNOSIS — G47.61 PLMD (PERIODIC LIMB MOVEMENT DISORDER): ICD-10-CM

## 2025-08-21 DIAGNOSIS — G47.33 OSA (OBSTRUCTIVE SLEEP APNEA): Primary | ICD-10-CM

## 2025-08-21 DIAGNOSIS — I10 PRIMARY HYPERTENSION: ICD-10-CM

## (undated) DEVICE — KT ORCA ORCAPOD DISP STRL

## (undated) DEVICE — GOWN ISOL W/THUMB UNIV BLU BX/15

## (undated) DEVICE — BLCK/BITE BLOX W/DENTL/RIM W/STRAP 54F

## (undated) DEVICE — ADAPT CLN BIOGUARD AIR/H2O DISP

## (undated) DEVICE — Device

## (undated) DEVICE — FLEX ADVANTAGE 1500CC: Brand: FLEX ADVANTAGE

## (undated) DEVICE — SINGLE-USE BIOPSY FORCEPS: Brand: RADIAL JAW 4

## (undated) DEVICE — BITEBLOCK OMNI BLOC

## (undated) DEVICE — MSK ENDO PORT O2 POM ELITE CURAPLEX A/

## (undated) DEVICE — FRCP BX RADJAW4 NDL 2.8 240CM LG OG BX40

## (undated) DEVICE — DEV INFL ALLIANCE2 SYS

## (undated) DEVICE — LN SMPL CO2 SHTRM SD STREAM W/M LUER

## (undated) DEVICE — ESOPHAGEAL BALLOON DILATATION CATHETER: Brand: CRE FIXED WIRE

## (undated) DEVICE — SENSR O2 OXIMAX FNGR A/ 18IN NONSTR

## (undated) DEVICE — TUBING, SUCTION, 1/4" X 10', STRAIGHT: Brand: MEDLINE

## (undated) DEVICE — ADAPT CLN SCPE ENDO PORPOISE BX/50 DISP

## (undated) DEVICE — CANN O2 ETCO2 FITS ALL CONN CO2 SMPL A/ 7IN DISP LF